# Patient Record
Sex: FEMALE | Race: WHITE
[De-identification: names, ages, dates, MRNs, and addresses within clinical notes are randomized per-mention and may not be internally consistent; named-entity substitution may affect disease eponyms.]

---

## 2017-11-04 ENCOUNTER — HOSPITAL ENCOUNTER (INPATIENT)
Dept: HOSPITAL 17 - NEPI | Age: 42
LOS: 2 days | Discharge: PSYCHIATRIC OR SUBSTANCE USE FACILITY | DRG: 917 | End: 2017-11-06
Attending: HOSPITALIST | Admitting: HOSPITALIST
Payer: SELF-PAY

## 2017-11-04 VITALS — OXYGEN SATURATION: 100 %

## 2017-11-04 VITALS — BODY MASS INDEX: 31.73 KG/M2 | HEIGHT: 64 IN | WEIGHT: 185.85 LBS

## 2017-11-04 VITALS — HEART RATE: 59 BPM

## 2017-11-04 VITALS — HEART RATE: 62 BPM

## 2017-11-04 VITALS — HEART RATE: 58 BPM

## 2017-11-04 VITALS — HEART RATE: 63 BPM

## 2017-11-04 DIAGNOSIS — F43.21: ICD-10-CM

## 2017-11-04 DIAGNOSIS — E87.1: ICD-10-CM

## 2017-11-04 DIAGNOSIS — I10: ICD-10-CM

## 2017-11-04 DIAGNOSIS — M48.52XA: ICD-10-CM

## 2017-11-04 DIAGNOSIS — R00.0: ICD-10-CM

## 2017-11-04 DIAGNOSIS — G93.40: ICD-10-CM

## 2017-11-04 DIAGNOSIS — D50.9: ICD-10-CM

## 2017-11-04 DIAGNOSIS — K21.9: ICD-10-CM

## 2017-11-04 DIAGNOSIS — T43.591A: Primary | ICD-10-CM

## 2017-11-04 DIAGNOSIS — F32.9: ICD-10-CM

## 2017-11-04 DIAGNOSIS — S20.229A: ICD-10-CM

## 2017-11-04 DIAGNOSIS — R40.2430: ICD-10-CM

## 2017-11-04 DIAGNOSIS — Z62.810: ICD-10-CM

## 2017-11-04 DIAGNOSIS — J96.00: ICD-10-CM

## 2017-11-04 LAB
% SATURATION IRON PROFILE: 5.8 % (ref 20–50)
ALBUMIN SERPL-MCNC: 3.3 GM/DL (ref 3.4–5)
ALP SERPL-CCNC: 103 U/L (ref 45–117)
ALT SERPL-CCNC: 14 U/L (ref 10–53)
AMYLASE SERPL-CCNC: 29 U/L (ref 25–115)
APAP SERPL-MCNC: (no result) MCG/ML (ref 10–30)
AST SERPL-CCNC: 17 U/L (ref 15–37)
BASOPHILS # BLD AUTO: 0.1 TH/MM3 (ref 0–0.2)
BASOPHILS NFR BLD: 1 % (ref 0–2)
BILIRUB INDIRECT SERPL-MCNC: 0 MG/DL (ref 0–0.8)
BILIRUB SERPL-MCNC: 0.1 MG/DL (ref 0.2–1)
DIRECT BILIRUBIN ADULT: 0.1 MG/DL (ref 0–0.2)
EOSINOPHIL # BLD: 0.5 TH/MM3 (ref 0–0.4)
EOSINOPHIL NFR BLD: 5.1 % (ref 0–4)
ERYTHROCYTE [DISTWIDTH] IN BLOOD BY AUTOMATED COUNT: 18.6 % (ref 11.6–17.2)
FERRITIN SERPL-MCNC: 12 NG/ML (ref 8–252)
HCT VFR BLD CALC: 26.7 % (ref 35–46)
HGB BLD-MCNC: 8.4 GM/DL (ref 11.6–15.3)
INR PPP: 1 RATIO
IRON (FE): 26 MCG/DL (ref 50–170)
LIPASE: 74 U/L (ref 73–393)
LYMPHOCYTES # BLD AUTO: 0.9 TH/MM3 (ref 1–4.8)
LYMPHOCYTES NFR BLD AUTO: 9.6 % (ref 9–44)
MAGNESIUM SERPL-MCNC: 1.6 MG/DL (ref 1.5–2.5)
MCH RBC QN AUTO: 24.5 PG (ref 27–34)
MCHC RBC AUTO-ENTMCNC: 31.6 % (ref 32–36)
MCV RBC AUTO: 77.4 FL (ref 80–100)
MONOCYTE #: 0.2 TH/MM3 (ref 0–0.9)
MONOCYTES NFR BLD: 1.7 % (ref 0–8)
NEUTROPHILS # BLD AUTO: 7.4 TH/MM3 (ref 1.8–7.7)
NEUTROPHILS NFR BLD AUTO: 82.6 % (ref 16–70)
PHOSPHATE SERPL-MCNC: 3.6 MG/DL (ref 2.5–4.9)
PLATELET # BLD: 342 TH/MM3 (ref 150–450)
PMV BLD AUTO: 6.5 FL (ref 7–11)
PROT SERPL-MCNC: 6.8 GM/DL (ref 6.4–8.2)
PROTHROMBIN TIME: 10.6 SEC (ref 9.8–11.6)
RBC # BLD AUTO: 3.44 MIL/MM3 (ref 4–5.3)
TIBC SERPL-MCNC: 447 MCG/DL (ref 250–450)
WBC # BLD AUTO: 9 TH/MM3 (ref 4–11)

## 2017-11-04 PROCEDURE — 71010: CPT

## 2017-11-04 PROCEDURE — 76937 US GUIDE VASCULAR ACCESS: CPT

## 2017-11-04 PROCEDURE — 82728 ASSAY OF FERRITIN: CPT

## 2017-11-04 PROCEDURE — 0BH17EZ INSERTION OF ENDOTRACHEAL AIRWAY INTO TRACHEA, VIA NATURAL OR ARTIFICIAL OPENING: ICD-10-PCS | Performed by: EMERGENCY MEDICINE

## 2017-11-04 PROCEDURE — 87641 MR-STAPH DNA AMP PROBE: CPT

## 2017-11-04 PROCEDURE — 84132 ASSAY OF SERUM POTASSIUM: CPT

## 2017-11-04 PROCEDURE — 82550 ASSAY OF CK (CPK): CPT

## 2017-11-04 PROCEDURE — 31500 INSERT EMERGENCY AIRWAY: CPT

## 2017-11-04 PROCEDURE — 82010 KETONE BODYS QUAN: CPT

## 2017-11-04 PROCEDURE — 84100 ASSAY OF PHOSPHORUS: CPT

## 2017-11-04 PROCEDURE — 36556 INSERT NON-TUNNEL CV CATH: CPT

## 2017-11-04 PROCEDURE — 84702 CHORIONIC GONADOTROPIN TEST: CPT

## 2017-11-04 PROCEDURE — 84443 ASSAY THYROID STIM HORMONE: CPT

## 2017-11-04 PROCEDURE — 71260 CT THORAX DX C+: CPT

## 2017-11-04 PROCEDURE — 82272 OCCULT BLD FECES 1-3 TESTS: CPT

## 2017-11-04 PROCEDURE — 83735 ASSAY OF MAGNESIUM: CPT

## 2017-11-04 PROCEDURE — 86900 BLOOD TYPING SEROLOGIC ABO: CPT

## 2017-11-04 PROCEDURE — 82150 ASSAY OF AMYLASE: CPT

## 2017-11-04 PROCEDURE — 80076 HEPATIC FUNCTION PANEL: CPT

## 2017-11-04 PROCEDURE — 85025 COMPLETE CBC W/AUTO DIFF WBC: CPT

## 2017-11-04 PROCEDURE — 94003 VENT MGMT INPAT SUBQ DAY: CPT

## 2017-11-04 PROCEDURE — 80307 DRUG TEST PRSMV CHEM ANLYZR: CPT

## 2017-11-04 PROCEDURE — 74177 CT ABD & PELVIS W/CONTRAST: CPT

## 2017-11-04 PROCEDURE — 85610 PROTHROMBIN TIME: CPT

## 2017-11-04 PROCEDURE — 84295 ASSAY OF SERUM SODIUM: CPT

## 2017-11-04 PROCEDURE — 82435 ASSAY OF BLOOD CHLORIDE: CPT

## 2017-11-04 PROCEDURE — 5A1935Z RESPIRATORY VENTILATION, LESS THAN 24 CONSECUTIVE HOURS: ICD-10-PCS | Performed by: EMERGENCY MEDICINE

## 2017-11-04 PROCEDURE — 72125 CT NECK SPINE W/O DYE: CPT

## 2017-11-04 PROCEDURE — 85730 THROMBOPLASTIN TIME PARTIAL: CPT

## 2017-11-04 PROCEDURE — 06HM33Z INSERTION OF INFUSION DEVICE INTO RIGHT FEMORAL VEIN, PERCUTANEOUS APPROACH: ICD-10-PCS | Performed by: SURGERY

## 2017-11-04 PROCEDURE — 93005 ELECTROCARDIOGRAM TRACING: CPT

## 2017-11-04 PROCEDURE — 96374 THER/PROPH/DIAG INJ IV PUSH: CPT

## 2017-11-04 PROCEDURE — 82565 ASSAY OF CREATININE: CPT

## 2017-11-04 PROCEDURE — 82140 ASSAY OF AMMONIA: CPT

## 2017-11-04 PROCEDURE — 99291 CRITICAL CARE FIRST HOUR: CPT

## 2017-11-04 PROCEDURE — 80171 DRUG SCREEN QUANT GABAPENTIN: CPT

## 2017-11-04 PROCEDURE — 83550 IRON BINDING TEST: CPT

## 2017-11-04 PROCEDURE — 70450 CT HEAD/BRAIN W/O DYE: CPT

## 2017-11-04 PROCEDURE — 83690 ASSAY OF LIPASE: CPT

## 2017-11-04 PROCEDURE — 80053 COMPREHEN METABOLIC PANEL: CPT

## 2017-11-04 PROCEDURE — 82805 BLOOD GASES W/O2 SATURATION: CPT

## 2017-11-04 PROCEDURE — 94002 VENT MGMT INPAT INIT DAY: CPT

## 2017-11-04 PROCEDURE — G0390 TRAUMA RESPONS W/HOSP CRITI: HCPCS

## 2017-11-04 PROCEDURE — 82947 ASSAY GLUCOSE BLOOD QUANT: CPT

## 2017-11-04 PROCEDURE — 72170 X-RAY EXAM OF PELVIS: CPT

## 2017-11-04 PROCEDURE — 82948 REAGENT STRIP/BLOOD GLUCOSE: CPT

## 2017-11-04 PROCEDURE — 84520 ASSAY OF UREA NITROGEN: CPT

## 2017-11-04 PROCEDURE — 86901 BLOOD TYPING SEROLOGIC RH(D): CPT

## 2017-11-04 PROCEDURE — 86850 RBC ANTIBODY SCREEN: CPT

## 2017-11-04 PROCEDURE — 94640 AIRWAY INHALATION TREATMENT: CPT

## 2017-11-04 PROCEDURE — 83605 ASSAY OF LACTIC ACID: CPT

## 2017-11-04 PROCEDURE — C9113 INJ PANTOPRAZOLE SODIUM, VIA: HCPCS

## 2017-11-04 PROCEDURE — 83540 ASSAY OF IRON: CPT

## 2017-11-04 PROCEDURE — 84466 ASSAY OF TRANSFERRIN: CPT

## 2017-11-04 PROCEDURE — 94664 DEMO&/EVAL PT USE INHALER: CPT

## 2017-11-04 PROCEDURE — 36600 WITHDRAWAL OF ARTERIAL BLOOD: CPT

## 2017-11-04 RX ADMIN — CHLORHEXIDINE GLUCONATE 0.12% ORAL RINSE SCH ML: 1.2 LIQUID ORAL at 20:07

## 2017-11-04 RX ADMIN — IPRATROPIUM BROMIDE AND ALBUTEROL SULFATE SCH AMPULE: .5; 3 SOLUTION RESPIRATORY (INHALATION) at 17:10

## 2017-11-04 RX ADMIN — HUMAN INSULIN SCH: 100 INJECTION, SOLUTION SUBCUTANEOUS at 18:00

## 2017-11-04 RX ADMIN — PHENYTOIN SODIUM SCH MLS/HR: 50 INJECTION INTRAMUSCULAR; INTRAVENOUS at 16:35

## 2017-11-04 RX ADMIN — PROPOFOL PRN MLS/HR: 10 INJECTION, EMULSION INTRAVENOUS at 22:21

## 2017-11-04 RX ADMIN — POLYVINYL ALCOHOL SCH DROP: 14 SOLUTION/ DROPS OPHTHALMIC at 18:03

## 2017-11-04 RX ADMIN — STANDARDIZED SENNA CONCENTRATE AND DOCUSATE SODIUM SCH TAB: 8.6; 5 TABLET, FILM COATED ORAL at 21:30

## 2017-11-04 RX ADMIN — PROPOFOL PRN MLS/HR: 10 INJECTION, EMULSION INTRAVENOUS at 16:35

## 2017-11-04 RX ADMIN — Medication SCH ML: at 21:30

## 2017-11-04 RX ADMIN — HEPARIN SODIUM SCH UNITS: 10000 INJECTION, SOLUTION INTRAVENOUS; SUBCUTANEOUS at 16:47

## 2017-11-04 RX ADMIN — HUMAN INSULIN SCH: 100 INJECTION, SOLUTION SUBCUTANEOUS at 23:37

## 2017-11-04 RX ADMIN — PROPOFOL PRN MLS/HR: 10 INJECTION, EMULSION INTRAVENOUS at 16:47

## 2017-11-04 NOTE — RADRPT
EXAM DATE/TIME:  11/04/2017 14:27 

 

[facility] COMPARISON:     

No previous studies available for comparison.

 

                     

INDICATIONS :     

Trauma alert. Possible fall down stairs

                     

 

MEDICAL HISTORY :            

Unobtainable   

 

SURGICAL HISTORY :        

Unobtainable

 

ENCOUNTER:     

Initial                                        

 

ACUITY:     

1 day      

 

PAIN SCORE:     

Non-responsive.

 

LOCATION:     

Bilateral chest 

 

FINDINGS:     

A single frontal view of the pelvis demonstrates no evidence of fracture.  The bony pelvic ring is in
tact.  Bony mineralization is normal.  The soft tissues are intact.

 

CONCLUSION:     Intact pelvis.

 

 

 

 Dash Andres MD on November 04, 2017 at 14:57           

Board Certified Radiologist.

 This report was verified electronically.

## 2017-11-04 NOTE — HHI.HP
History of Present Illness


Primary Care Physician


Unknown


Admission Diagnosis





unresponsive, respiratory failure, overdose


Diagnoses:  


History of Present Illness


42 year-old female according to the paramedic report likely overdose atarax-was 

found down at the bottom of the staircase.  Her GCS was 3 on arrival she did 

not respond to Marcaine she was difficult access so that so that I was inserted 

to a tibia she remained hemodynamically normal with the low GCS.  Open arrival 

she was orotracheally intubated.  Fast negative exam by external trauma right 

femoral vein IV Cordis access was obtained and patient brought was brought to 

CAT scan for her workup.





Review of Systems


ROS Limitations:  Clinical Condition, Intoxication, Intubated, Altered Mental 

Status, Unresponsive


Constitutional:  DENIES: Fever, Chills, Change in appetite


Endocrine:  DENIES: Heat/cold intolerance


Eyes:  DENIES: Blurred vision, Eye pain





Past Family Social History


Past Medical History


cannot be obtained


Past Surgical History


c spine fusion


Reported Medications


cannot be obtained


Family History


cannot be obtained


Social History


cannot be obtained





Physical Exam


Vital Signs





Vital Signs








  Date Time  Temp Pulse Resp B/P (MAP) Pulse Ox O2 Delivery O2 Flow Rate FiO2


 


17 15:08     100   100








Physical Exam


GENERAL: This is a well-nourished,nonresponsive


SKIN: No rashes, ecchymoses or lesions. Cool and dry.


HEAD: Atraumatic. Normocephalic.


EYES: Pupils equal round and reactive. Extraocular motions intact. 


ENT: Nose without bleeding. Airway patent.


NECK: Trachea midline. No JVD or lymphadenopathy. Supple,


CARDIOVASCULAR: Regular rate and rhythm without murmurs, gallops, or rubs. 


RESPIRATORY: Clear to auscultation. Breath sounds equal bilaterally. No wheezes

, rales, or rhonchi.  


GASTROINTESTINAL: Abdomen soft, , nondistended.  N


MUSCULOSKELETAL: Extremities without clubbing, cyanosis, or edema. No join, 

effusion, or edema 


NEUROLOGICAL: gcs 3.


Laboratory





Laboratory Tests








Test


  17


14:38


 


White Blood Count 9.0 


 


Red Blood Count 3.44 


 


Hemoglobin 8.4 


 


Bedside Hemoglobin 9.5 


 


Hematocrit 26.7 


 


Bedside Hematocrit 28.0 


 


Mean Corpuscular Volume 77.4 


 


Mean Corpuscular Hemoglobin 24.5 


 


Mean Corpuscular Hemoglobin


Concent 31.6 


 


 


Red Cell Distribution Width 18.6 


 


Platelet Count 342 


 


Mean Platelet Volume 6.5 


 


Neutrophils (%) (Auto) 82.6 


 


Lymphocytes (%) (Auto) 9.6 


 


Monocytes (%) (Auto) 1.7 


 


Eosinophils (%) (Auto) 5.1 


 


Basophils (%) (Auto) 1.0 


 


Neutrophils # (Auto) 7.4 


 


Lymphocytes # (Auto) 0.9 


 


Monocytes # (Auto) 0.2 


 


Eosinophils # (Auto) 0.5 


 


Basophils # (Auto) 0.1 


 


CBC Comment DIFF FINAL 


 


Differential Comment  


 


Prothrombin Time 10.6 


 


Prothromb Time International


Ratio 1.0 


 


 


Activated Partial


Thromboplast Time 20.1 


 


 


Bedside Sodium 130 


 


Bedside Potassium 4.7 


 


Bedside Chloride 105 


 


Bedside Blood Urea Nitrogen 10 


 


Bedside Creatinine 0.8 


 


Bedside Glucose 116 








Result Diagram:  


17





Imaging





Last 48 hours Impressions








Pelvis X-Ray 17 Signed





Impressions: 





 Service Date/Time:  2017 14:27 - CONCLUSION: Intact 





 pelvis.     Dash Andres MD 


 


Head CT 17 Signed





Impressions: 





 Service Date/Time:  2017 14:54 - CONCLUSION:  Negative 





 noncontrast head CT.     Dash Andres MD 


 


Chest X-Ray 17 Signed





Impressions: 





 Service Date/Time:  2017 14:27 - CONCLUSION:  Bilateral 





 airspace opacities, left worse than right. Endotracheal tube tip close to the 





 adalberto.     Dash Andres MD 











Capjuvenali VTE Risk Assessment


Caprini VTE Risk Assessment:  No/Low Risk (score <= 1)


Caprini Risk Assessment Model











 Point Value = 1          Point Value = 2  Point Value = 3  Point Value = 5


 


Age 41-60


Minor surgery


BMI > 25 kg/m2


Swollen legs


Varicose veins


Pregnancy or postpartum


History of unexplained or recurrent


   spontaneous 


Oral contraceptives or hormone


   replacement


Sepsis (< 1 month)


Serious lung disease, including


   pneumonia (< 1 month)


Abnormal pulmonary function


Acute myocardial infarction


Congestive heart failure (< 1 month)


History of inflammatory bowel disease


Medical patient at bed rest Age 61-74


Arthroscopic surgery


Major open surgery (> 45 min)


Laparoscopic surgery (> 45 min)


Malignancy


Confined to bed (> 72 hours)


Immobilizing plaster cast


Central venous access Age >= 75


History of VTE


Family history of VTE


Factor V Leiden


Prothrombin 40333D


Lupus anticoagulant


Anticardiolipin antibodies


Elevated serum homocysteine


Heparin-induced thrombocytopenia


Other congenital or acquired


   thrombophilia Stroke (< 1 month)


Elective arthroplasty


Hip, pelvis, or leg fracture


Acute spinal cord injury (< 1 month)








Prophylaxis Regimen











   Total Risk


Factor Score Risk Level Prophylaxis Regimen


 


0-1      Low Early ambulation


 


2 Moderate Order ONE of the following:


*Sequential Compression Device (SCD)


*Heparin 5000 units SQ BID


 


3-4 Higher Order ONE of the following medications:


*Heparin 5000 units SQ TID


*Enoxaparin/Lovenox 40 mg SQ daily (WT < 150 kg, CrCl > 30 mL/min)


*Enoxaparin/Lovenox 30 mg SQ daily (WT < 150 kg, CrCl > 10-29 mL/min)


*Enoxaparin/Lovenox 30 mg SQ BID (WT < 150 kg, CrCl > 30 mL/min)


AND/OR


*Sequential Compression Device (SCD)


 


5 or more Highest Order ONE of the following medications:


*Heparin 5000 units SQ TID (Preferred with Epidurals)


*Enoxaparin/Lovenox 40 mg SQ daily (WT < 150 kg, CrCl > 30 mL/min)


*Enoxaparin/Lovenox 30 mg SQ daily (WT < 150 kg, CrCl > 10-29 mL/min)


*Enoxaparin/Lovenox 30 mg SQ BID (WT < 150 kg, CrCl > 30 mL/min)


AND


*Sequential Compression Device (SCD)











Assessment and Plan


Assessment and Plan


Drug overdose-atarax-resulting in coma


No traumatic injuries


Orotracheal intubation follow GCS


Right femoral central line for poor access





Admit to medical intensivist











Sruthi Plummer MD 2017 15:54

## 2017-11-04 NOTE — PD.CONS
[facility]


Service


Critical Care Medicine


Consult Requested By


Dr. Plummer


Reason for Consult


Critical care management status post fall/possible overdose with hydroxyzine


Primary Care Physician


Unknown


History of Present Illness


This is a 42-year-old  female.  Actual name is Yrn Yanes.  Date of 

admission 11/4/2017.  Date of consultation 11/4/2017.  She has a history of 

gastroesophageal reflux disease, diarrhea and unknown psychiatric history.  She 

is on mirtazapine, fluoxetine, gabapentin, benztropine, hydroxyzine, loperamide 

and pantoprazole.  She presented to [facility] as a trauma alert 1 status post 

fall down unknown amount of steps and was found by boyfriend with an almost 

empty bottle of Atarax 50 mg tablets #60.





Upon arrival patient was collared on a backboard protecting her airway.  

Patient was intubated with 7.5 ET tube@receiving 40 mg etomidate 200 mg 

succinylcholine.  The trauma surgeon put a right femoral Cordis central line.  

Please refer to his procedure note.  Patient's blood pressure continued to be 

stable.  According to the records, there is noted to be contusions on her upper 

back was noticed along with the superficial laceration perirectally at 5 o'

clock position.  There was a significant step off at C7-T1.  Images negative.  

Trauma surgery signed off on the trauma since as per him this is a medical case 

given the overdose. 





CT C-spine revealed no acute fracture.  Previous posterior cervical occipital 

cervical thoracic fusion at the level TII.  Also discectomy and fusion 

procedure with anterior instrumentation from C2 through C6.  Chronic appearing 

anterior compression fracture at C5.  Noted kyphotic deformity C5/C6.





Review of Systems


ROS Limitations:  Intubated





Past Family Social History


Past Medical History


Depression


Gastroesophageal reflux disease


Diarrhea


Past Surgical History


Prior cervicooccipital/cervicothoracic fusion.  Prior discectomy/fusion with 

anterior instrumentation C3 to C6.


Reported Medications


Mirtazapine 15 mg by mouth daily


Fluoxetine 40 mg by mouth daily


Pantoprazole 40 mg by mouth daily


Gabapentin 600 mg by mouth 4 times a day


Benztropine 1 mg twice a day


Loperamide 2 mg as needed


Hydroxyzine 50 mg by mouth twice a day


Active Ordered Medications


Reviewed in EMR


Family History


Unknown/unobtainable.  Family is currently unavailable


Social History


Unknown if any prior alcohol/tobacco or illicit drug use.  Urine toxicology 

screen pending.





Physical Exam


Vital Signs





Vital Signs








  Date Time  Temp Pulse Resp B/P (MAP) Pulse Ox O2 Delivery O2 Flow Rate FiO2


 


11/4/17 15:08     100   100








Physical Exam


GENERAL: 42 year old  female currently in c-collar


SKIN: Warm and dry.  We'll perfused


HEAD: Atraumatic. Normocephalic. 


EYES: Pupils equal and round about 5 mm bilaterally and reactive. No scleral 

icterus. No injection or drainage. 


ENT: No nasal bleeding or discharge.  Mucous membranes pink and moist.  

Orotracheally intubated


NECK: Trachea midline. No JVD.  Currently in c-collar


CARDIOVASCULAR: Tachycardic, RR.  S1, S2.  No S4.  Without murmur


RESPIRATORY: Distant but essentially Clear to auscultation. Breath sounds equal 

bilaterally. 


GASTROINTESTINAL: Abdomen soft, non-tender, obese.  Hypoactive bowel sounds are 

appreciated


MUSCULOSKELETAL: Extremities without difficulty and peripheral edema. No 

obvious deformities. 


NEUROLOGICAL: Currently sedated on the ventilator propofol drip at 50 mu./kg 

per minute.  Positive gag.  Positive corneal reflux.  Currently not withdrawing 

to pain.


Laboratory





Laboratory Tests








Test


  11/4/17


14:38


 


White Blood Count 9.0 


 


Red Blood Count 3.44 


 


Hemoglobin 8.4 


 


Bedside Hemoglobin 9.5 


 


Hematocrit 26.7 


 


Bedside Hematocrit 28.0 


 


Mean Corpuscular Volume 77.4 


 


Mean Corpuscular Hemoglobin 24.5 


 


Mean Corpuscular Hemoglobin


Concent 31.6 


 


 


Red Cell Distribution Width 18.6 


 


Platelet Count 342 


 


Mean Platelet Volume 6.5 


 


Neutrophils (%) (Auto) 82.6 


 


Lymphocytes (%) (Auto) 9.6 


 


Monocytes (%) (Auto) 1.7 


 


Eosinophils (%) (Auto) 5.1 


 


Basophils (%) (Auto) 1.0 


 


Neutrophils # (Auto) 7.4 


 


Lymphocytes # (Auto) 0.9 


 


Monocytes # (Auto) 0.2 


 


Eosinophils # (Auto) 0.5 


 


Basophils # (Auto) 0.1 


 


CBC Comment DIFF FINAL 


 


Differential Comment  


 


Prothrombin Time 10.6 


 


Prothromb Time International


Ratio 1.0 


 


 


Activated Partial


Thromboplast Time 20.1 


 


 


Bedside Sodium 130 


 


Bedside Potassium 4.7 


 


Bedside Chloride 105 


 


Bedside Blood Urea Nitrogen 10 


 


Bedside Creatinine 0.8 


 


Bedside Glucose 116 








Result Diagram:  


11/4/17 1438





Imaging





Last Impressions








Pelvis X-Ray 11/4/17 1439 Signed





Impressions: 





 Service Date/Time:  Saturday, November 4, 2017 14:27 - CONCLUSION: Intact 





 pelvis.     Dash Andres MD 


 


Head CT 11/4/17 1439 Signed





Impressions: 





 Service Date/Time:  Saturday, November 4, 2017 14:54 - CONCLUSION:  Negative 





 noncontrast head CT.     Dash Andres MD 


 


Chest X-Ray 11/4/17 1439 Signed





Impressions: 





 Service Date/Time:  Saturday, November 4, 2017 14:27 - CONCLUSION:  Bilateral 





 airspace opacities, left worse than right. Endotracheal tube tip close to the 





 adalberto.     Dash Andres MD 


 


Chest CT 11/4/17 1439 Signed





Impressions: 





 Service Date/Time:  Saturday, November 4, 2017 14:58 - CONCLUSION:  Mild 





 airspace consolidation of both lungs and tiny right, very small left pleural 





 effusions.     Dash Andres MD 


 


Cervical Spine CT 11/4/17 1439 Signed





Impressions: 





 Service Date/Time:  Saturday, November 4, 2017 14:54 - CONCLUSION:  1. No 

acute 





 fracture or acute appearing malalignment of the cervical spine. 2. Previous 





 anterior and posterior fusion as above. No bony bridging at C2/C3 but the 





 anterior fusion appears otherwise solid down to C6.     Dash Andres MD 


 


Abdomen/Pelvis CT 11/4/17 1439 Signed





Impressions: 





 Service Date/Time:  Saturday, November 4, 2017 14:56 - CONCLUSION:  No 

evidence 





 of acute trauma the abdomen or pelvis. Large stool throughout the colon.     





 Dash Andres MD 











Assessment and Plan


Assessment and Plan


Neuro/Psych:





Acute altered mental status possible hydroxyzine overdose


Prior cervical discectomy/fusion





CT brain 11/4 revealed no acute intracranial findings


CT C-spine revealed previous posterior cervicooccipital and cervico thoracic 

fusion at the level T2.  Discectomy and fusion procedure with anterior 

instrumentation of C2 through C6.  Chronic appearing anterior compression 

deformity of the C5 vertebral body with kyphotic deformity at C5/C6.


Currently on propofol/fentanyl drips for sedation/analgesia while intubated


Goal of RA SS -2


Daily sedation vacation


Urine drug screen currently pending along with EtOH, salicylates and 

acetaminophen





Holding home medications of mirtazapine 15 mg, fluoxetine 40 mg daily, 

gabapentin 600 mg 4 times a day, been serving 1 mg twice a day and hydroxyzine 

50 mg twice a day


Check gabapentin level





Poison control be notified





CV:





Sinus tachycardia


Hypertension





Patient is currently normal saline at 84 cc an hour.


Status post 1 L crystalloid in ED


Currently not requiring vasopressors and/or antihypertensives.


Follow-up EKG 





Resp:





Acute respiratory failure





[facility] 16/550/1/5/100


Ventilator bundle


Albuterol/ipratropium aerosols with albuterol aerosols every 2 hours.  Dyspnea


Spontaneous breathing trials daily


 


GI: 





Gastroesophageal reflux disease


History of diarrhea





NGT to LIWS


Pantoprazole for GI prophylaxis


Docusate sodium/senna 1 tablet twice a day for bowel regimen


Status post 50 g sorbitol and charcoal for gastric decontamination


Holding loperamide 2 mg as needed





:  





Meek catheter will be placed for accurate I's and O's in a critically ill 

patient





Endo:





Sliding-scale insulin if indicated to maintain euglycemia


Check TSH


  


Renal:





Creatinine currently within normal limits


Monitor urine output


Accurate I's and O's


 


Heme:





Microcytic anemia





Check iron studies/TIBC and ferritin.


No indications for transfusion of blood proximally at this time.





ID:





Monitor for infection





FEN:





Hyponatremia





Replace electrolytes as clinically indicated per ICU electrolyte protocol





MSK:





PT evaluate and treat





Access


- Utilize peripheral IV.  Remove right femoral Cordis once peripheral IVs 

established





Prophylaxis


- GI - pantoprazole


- DVT - SCD/heparin subcutaneous





Critical Care:


The total critical care time was 35 minutes. Time to perform other separately 

billable procedures was not included in the critical care time.


Code Status


Full code


Discussed Condition With


Dr. Hoang.  Care plan discussed and all questions answered











Jose Wilson MD Nov 4, 2017 16:05

## 2017-11-04 NOTE — PD
HPI


Chief Complaint:  trauma alert


Time Seen by Provider:  14:28


Travel History


International Travel<30 days:  No


Contact w/Intl Traveler<30days:  No


Traveled to known affect area:  No





History of Present Illness


HPI


Patient with unknown ID was brought in as a trauma alert by the paramedic.  I 

was in the trauma bay waiting for the patient's arrival.  Initially the patient 

was found laying on the floor by her boyfriend in her house at the bottom of 

the stairs with both legs tucked under her bottom.  Patient was GCS of 3 upon 

their arrival.  They tried to give her Narcan without any reversal.  No 

external injuries were noticed.  There was an empty bottle of hydroxyzine 

noticed next to her that was filled recently and only 2 pills left in the 

bottle.  She was maintaining her airway.  They brought her in boarded and 

collared.  Patient obviously is unable to give any history.  She was 

hemodynamically stable and GCS continued to be 3.





PFSH


Past Medical History


*** Narrative Medical


Unknown





Allergies-Medications


(Allergen,Severity, Reaction):  


Coded Allergies:  


     Sulfa (Sulfonamide Antibiotics) (Verified  Allergy, Severe, 11/6/17)


     azithromycin (Verified  Allergy, Severe, 11/6/17)


     ondansetron (Verified  Allergy, Severe, Swelling, 11/5/17)


     cyclobenzaprine (Verified  Allergy, Intermediate, 11/5/17)


     propoxyphene (Verified  Allergy, Intermediate, 11/5/17)


Comments


Unknown


Reported Meds & Prescriptions





Reported Meds & Active Scripts


Active


Reported


Loperamide (Loperamide HCl) 2 Mg Cap 2 Mg PO AS DIRECTED PRN


     One capsule after each loose stool.


     Not to exceed 8 capsules per day.


Pantoprazole (Pantoprazole Sodium) 40 Mg Tab 40 Mg PO BID


Gabapentin 600 Mg Tab 600 Mg PO QID


Fluoxetine (Fluoxetine HCl) 40 Mg Cap 40 Cap PO DAILY


Mirtazapine 15 Mg Tab 15 Mg PO HS





Narrative Medication


Unknown





Review of Systems


ROS Limitations:  Unresponsive


Except as stated in HPI:  all other systems reviewed are Neg





Physical Exam


Narrative


GENERAL: Unresponsive, boarded and collared


SKIN: Focused skin assessment warm/dry.  Contusions on the upper back


HEAD: Atraumatic. Normocephalic. 


EYES: Pupils equal and round. No scleral icterus. No injection or drainage. 


ENT: No nasal bleeding or discharge.  Mucous membranes pink and moist.


NECK: Trachea midline. No JVD. 


CARDIOVASCULAR: Regular rate and rhythm.  No murmur appreciated.


RESPIRATORY: No accessory muscle use. Clear to auscultation. Breath sounds 

equal bilaterally. 


GASTROINTESTINAL: Abdomen soft, non-tender, nondistended. Hepatic and splenic 

margins not palpable.  Perirectal laceration at 5 o'clock position


MUSCULOSKELETAL: No obvious deformities. No clubbing.  No cyanosis.  No edema. 


NEUROLOGICAL: GCS of 3


PSYCHIATRIC: Unable to assess





Data


Data


Last Documented VS





Vital Signs








  Date Time  Temp Pulse Resp B/P (MAP) Pulse Ox O2 Delivery O2 Flow Rate FiO2


 


11/4/17 15:08     100   100








Orders





 Orders


I-Stat Profile (11/4/17 14:39)


I-Stat Creatinine (11/4/17 14:39)


Complete Blood Count With Diff (11/4/17 14:39)


Prothrombin Time / Inr (Pt) (11/4/17 14:39)


Act Partial Throm Time (Ptt) (11/4/17 14:39)


Type And Screen (11/4/17 14:39)


Chest, Single Ap (11/4/17 14:39)


Pelvis, Ap Only (Routine) (11/4/17 14:39)


Ct Brain W/O Iv Contrast(Rout) (11/4/17 14:39)


Ct Cerv Spine W/O Contrast (11/4/17 14:39)


Ct Abd/Pel W Iv Contrast(Rout) (11/4/17 14:39)


Ct Thorax/ Chest W Iv Contrast (11/4/17 14:39)


Iv Access Insert/Monitor (11/4/17 14:39)


Ecg Monitoring (11/4/17 14:39)


Oximetry (11/4/17 14:39)


Oxygen Administration (11/4/17 14:39)


Propofol 1000 Mg/100 Ml Inj (Diprivan 10 (11/4/17 14:40)


Admit Order (Ed Use Only) (11/4/17 15:17)





Labs





Laboratory Tests








Test


  11/4/17


14:38


 


White Blood Count 9.0 TH/MM3 


 


Red Blood Count 3.44 MIL/MM3 


 


Hemoglobin 8.4 GM/DL 


 


Bedside Hemoglobin 9.5 G/DL 


 


Hematocrit 26.7 % 


 


Bedside Hematocrit 28.0 % 


 


Mean Corpuscular Volume 77.4 FL 


 


Mean Corpuscular Hemoglobin 24.5 PG 


 


Mean Corpuscular Hemoglobin


Concent 31.6 % 


 


 


Red Cell Distribution Width 18.6 % 


 


Platelet Count 342 TH/MM3 


 


Mean Platelet Volume 6.5 FL 


 


Neutrophils (%) (Auto) 82.6 % 


 


Lymphocytes (%) (Auto) 9.6 % 


 


Monocytes (%) (Auto) 1.7 % 


 


Eosinophils (%) (Auto) 5.1 % 


 


Basophils (%) (Auto) 1.0 % 


 


Neutrophils # (Auto) 7.4 TH/MM3 


 


Lymphocytes # (Auto) 0.9 TH/MM3 


 


Monocytes # (Auto) 0.2 TH/MM3 


 


Eosinophils # (Auto) 0.5 TH/MM3 


 


Basophils # (Auto) 0.1 TH/MM3 


 


CBC Comment DIFF FINAL 


 


Differential Comment  


 


Prothrombin Time 10.6 SEC 


 


Prothromb Time International


Ratio 1.0 RATIO 


 


 


Activated Partial


Thromboplast Time 20.1 SEC 


 


 


Bedside Sodium 130 MMOL/L 


 


Bedside Potassium 4.7 MMOL/L 


 


Bedside Chloride 105 MMOL/L 


 


Bedside Blood Urea Nitrogen 10 MG/DL 


 


Bedside Creatinine 0.8 MG/DL 


 


Bedside Glucose 116 MG/DL 


 


Phosphorus Level 3.6 MG/DL 


 


Magnesium Level 1.6 MG/DL 


 


Iron Level 26 MCG/DL 


 


Total Iron Binding Capacity 447 MCG/DL 


 


Percent Iron Saturation 5.8 % 


 


Transferrin 319 MG/DL 


 


Ferritin 12 NG/ML 


 


Total Bilirubin 0.1 MG/DL 


 


Direct Bilirubin 0.1 MG/DL 


 


Indirect Bilirubin 0.0 MG/DL 


 


Aspartate Amino Transf


(AST/SGOT) 17 U/L 


 


 


Alanine Aminotransferase


(ALT/SGPT) 14 U/L 


 


 


Alkaline Phosphatase 103 U/L 


 


Total Creatine Kinase 89 U/L 


 


Total Protein 6.8 GM/DL 


 


Albumin 3.3 GM/DL 


 


Amylase Level 29 U/L 


 


Lipase 74 U/L 


 


Thyroid Stimulating Hormone


3rd Gen 1.950 uIU/ML 


 


 


Human Chorionic Gonadotropin,


Quant 3 MIU/ML 


 


 


Salicylates Level 13.0 MG/DL 


 


Acetaminophen Level


  LESS THAN 2.0


MCG/ML


 


Ethyl Alcohol Level


  LESS THAN 3


MG/DL


 


B-Hydroxybutyrate 0.07 MMOL/L 











[facility]


Medical Screen Exam Complete:  Yes


Emergency Medical Condition:  Yes


Medical Record Reviewed:  Yes


EKG Prior to Arrival:  Yes


Differential Diagnosis


Intracranial bleed, intrathoracic injury, intrabdominal injury, cervical 

fracture


Narrative Course


3:11 PM given patient's poor GCS status initially I decided to intubate her to 

protect her airway.  Please refer to my procedure note regarding this.  The 

trauma surgeon was present and together patient was inspected as a secondary 

infection.  No external injuries anteriorly was noticed.  The trauma surgeon 

put a right femoral central line.  Please refer to his procedure note.  Patient'

s blood pressure continued to be stable.  She was rolled off the backboard and 

contusions on her upper back was noticed along with the superficial laceration 

perirectally at 5 o'clock position.  There was a significant step off at C7-T1.

  Patient remained hemodynamically stable when left the trauma bay to go for CT 

scan.  The trauma surgeon assisted her.  Portable chest x-ray confirmed tube 

position.  The trauma surgeon just called me to let me note that there were no 

internal injuries and he will wanted the patient to be admitted medically.  He 

works sign off on the trauma since as per him this is a medical case given the 

overdose.  I put a call out for the intensivist for patient's admission.  

Patient was given 1 L of IV fluid bolus.


Critical Care Narrative


Aggregate critical care time was 45 minutes. Time to perform other separately 

billable procedures was not  


included in the critical care time. My time did not include minutes spent 

treating any other patients simultaneously or on  


activities that did not directly contribute to the patient's treatment.  





The services I provided to this patient were to treat and/or prevent clinically 

significant deterioration that could result  


in:  Unresponsive, respiratory failure





I provided critical care services requiring my management, as noted below:


Chart data review, documentation time, medication orders and management, vital 

sign assessments/reviewing monitor data,  


ordering and reviewing lab tests, ordering and interpreting/reviewing x-rays 

and diagnostic studies, care of the patient  


and discussion of the patient with the admitting physicians.


Procedures


**Procedure Narrative**


After the risks and benefits were discussed the following procedure was 

performed:


INTUBATION: The patient was put in optimal position for the procedure.  Rapid 

sequence intubation was initiated by me using  


40 milligrams of etomidate IV and 200 milligrams of succinylcholine IV.  The 

patient was intubated with a 7.5 cuffed endotracheal  


tube.  Tube placement was confirmed by visualization of the tube and balloon 

passing through the cords, capnometry and  


subsequent chest x-ray.  Breath sounds were equal and well aerated bilaterally 

postintubation.  No breath sounds over  


stomach.  Patient tolerated procedure well.





Emergency department E-FAST was performed with patient consent.


The curvilinear probe was used in the right upper quadrant/Morison's pouch, 

suprapubic, left upper quadrant/spleenorenal space, epigastric, parasternal 

long axis and anterior bilateral chest wall.  There was no evidence of 

peritoneal free fluid, pericardial effusion, or pneumothorax.





Trauma Alert - Level One


Trauma Alert Level One:  Full trauma team activate, Patient evaluated, Trauma 

surgeon summoned


Time Surgeon Summoned:  14:15





Physician Communication


Dr. Plummer, Dr. Hoang





Diagnosis


Diagnosis:  


 Primary Impression:  


 Unresponsive


 Additional Impressions:  


 Respiratory failure


 Qualified Codes:  J96.00 - Acute respiratory failure, unspecified whether with 

hypoxia or hypercapnia


 Overdose


 Qualified Codes:  T50.904A - Poisoning by unspecified drugs, medicaments and 

biological substances, undetermined, initial encounter


Admitting Physician Requests:  Admit


Scripts


Ferrous Sulfate (Ferosul) 325 Mg (65 Mg Iron) Tablet


325 MG PO BID@12,17 for iron deficiency anemia, #60 MG


   Prov: Bee Vo MD         11/6/17











Terrie Coleman MD Nov 4, 2017 14:53

## 2017-11-04 NOTE — PD.PROCEDR
Central Line Procedure


REASON FOR PROCEDURE


Central venous access





PROCEDURE PERFORMED


Central line placement: [right femoral vein ]





CONSENT


emergency procedure





ANESTHESIA


Local injection of 1% Lidocaine





DESCRIPTION OF THE PROCEDURE


The patient was placed in supine position. Right femoral area was exposed and 

cleansed with ChloraPrep, times two.  Large sterile drape was used to cover the 

patient, with the site exposed, under sterile conditions including cap, face 

mask, sterile gown, and sterile gloves.  On single attempt, the introducer 

needle was inserted with negative pressure in syringe and venous flash was 

obtained.  The guide wire was then advanced without any restriction and the 

needle was removed.  The dilator was used without any complications.  Using 

Seldinger technique the [cordis ] catheter was advanced over the guide wire to 

a depth of [ ] centimeters.  The guide wire was removed.  All ports were 

aspirated with dark venous blood return and flushed easily with sterile saline.

  All ports were capped.  Antibiotic disc was placed around central line at 

puncture site.  The central line was secured to the skin with two interrupted 

2.0 silk sutures.  The area was bandaged with sterile see-through central line 

bandage.





RADIOLOGICAL DATA


landmark technique.





COMPLICATIONS:


No apparent complications





ESTIMATED BLOOD LOSS:


Less than 1 cc.











Sruthi Plummer MD Nov 4, 2017 15:43

## 2017-11-04 NOTE — RADRPT
EXAM DATE/TIME:  11/04/2017 14:58 

 

[facility] COMPARISON:     

No previous studies available for comparison.

 

 

INDICATIONS :     

Trauma alert; fall down stairs.

                      

 

IV CONTRAST:     

75 cc Omnipaque 350 (iohexol) IV 

 

 

RADIATION DOSE:     

42.68 CTDIvol (mGy) ; Combined studies - Thorax/Abdomen/Pelvis; Patient body habitus

 

 

MEDICAL HISTORY :     

Non-responsive.  

 

SURGICAL HISTORY :      

Non-responsive. 

 

ENCOUNTER:      

Initial

 

ACUITY:      

1 day

 

PAIN SCALE:      

Non-responsive

 

LOCATION:       

Bilateral chest 

 

TECHNIQUE:      

Volumetric scanning of the chest was performed.  Using automated exposure control and adjustment of t
he mA and/or kV according to patient size, radiation dose was kept as low as reasonably achievable to
 obtain optimal diagnostic quality images.   DICOM format image data is available electronically for 
review and comparison.  

 

Follow-up recommendations for detected pulmonary nodules are based at a minimum on nodule size and pa
tient risk factors according to Fleischner Society Guidelines.

 

FINDINGS:     

Patchy streaky airspace consolidation seen of both lungs, mostly dependently. There are low density t
iny right and very small left pleural effusions as well. Endotracheal tube tip is approximately 2.5 c
m above the adalberto.

 

     Heart and mediastinum are within normal limits. I don't see a fracture of the visualized osseous
 structures.

 

CONCLUSION:     

Mild airspace consolidation of both lungs and tiny right, very small left pleural effusions.

 

 

 

 Dash Andres MD on November 04, 2017 at 15:31           

Board Certified Radiologist.

 This report was verified electronically.

## 2017-11-04 NOTE — HHI.HP
History of Present Illness


Primary Care Physician


Unknown


Admission Diagnosis





unresponsive, respiratory failure, overdose


Diagnoses:  


History of Present Illness


42 year-old female according to the paramedic report likely overdose atarax-was 

found down at the bottom of the staircase.  Her GCS was 3 on arrival she did 

not respond to Marcaine she was difficult access so that so that I was inserted 

to a tibia she remained hemodynamically normal with the low GCS.  Open arrival 

she was orotracheally intubated.  Fast negative exam by external trauma right 

femoral vein IV Cordis access was obtained and patient brought was brought to 

CAT scan for her workup.





Review of Systems


ROS Limitations:  Clinical Condition, Intoxication, Intubated, Altered Mental 

Status, Unresponsive


Constitutional:  DENIES: Fever, Chills, Change in appetite


Endocrine:  DENIES: Heat/cold intolerance


Eyes:  DENIES: Blurred vision, Eye pain





Past Family Social History


Past Medical History


cannot be obtained


Past Surgical History


c spine fusion


Reported Medications


cannot be obtained


Family History


cannot be obtained


Social History


cannot be obtained





Physical Exam


Vital Signs





Vital Signs








  Date Time  Temp Pulse Resp B/P (MAP) Pulse Ox O2 Delivery O2 Flow Rate FiO2


 


17 15:08     100   100








Physical Exam


GENERAL: This is a well-nourished,nonresponsive


SKIN: No rashes, ecchymoses or lesions. Cool and dry.


HEAD: Atraumatic. Normocephalic.


EYES: Pupils equal round and reactive. Extraocular motions intact. 


ENT: Nose without bleeding. Airway patent.


NECK: Trachea midline. No JVD or lymphadenopathy. Supple,


CARDIOVASCULAR: Regular rate and rhythm without murmurs, gallops, or rubs. 


RESPIRATORY: Clear to auscultation. Breath sounds equal bilaterally. No wheezes

, rales, or rhonchi.  


GASTROINTESTINAL: Abdomen soft, , nondistended.  N


MUSCULOSKELETAL: Extremities without clubbing, cyanosis, or edema. No join, 

effusion, or edema 


NEUROLOGICAL: gcs 3.


Laboratory





Laboratory Tests








Test


  17


14:38


 


White Blood Count 9.0 


 


Red Blood Count 3.44 


 


Hemoglobin 8.4 


 


Bedside Hemoglobin 9.5 


 


Hematocrit 26.7 


 


Bedside Hematocrit 28.0 


 


Mean Corpuscular Volume 77.4 


 


Mean Corpuscular Hemoglobin 24.5 


 


Mean Corpuscular Hemoglobin


Concent 31.6 


 


 


Red Cell Distribution Width 18.6 


 


Platelet Count 342 


 


Mean Platelet Volume 6.5 


 


Neutrophils (%) (Auto) 82.6 


 


Lymphocytes (%) (Auto) 9.6 


 


Monocytes (%) (Auto) 1.7 


 


Eosinophils (%) (Auto) 5.1 


 


Basophils (%) (Auto) 1.0 


 


Neutrophils # (Auto) 7.4 


 


Lymphocytes # (Auto) 0.9 


 


Monocytes # (Auto) 0.2 


 


Eosinophils # (Auto) 0.5 


 


Basophils # (Auto) 0.1 


 


CBC Comment DIFF FINAL 


 


Differential Comment  


 


Prothrombin Time 10.6 


 


Prothromb Time International


Ratio 1.0 


 


 


Activated Partial


Thromboplast Time 20.1 


 


 


Bedside Sodium 130 


 


Bedside Potassium 4.7 


 


Bedside Chloride 105 


 


Bedside Blood Urea Nitrogen 10 


 


Bedside Creatinine 0.8 


 


Bedside Glucose 116 








Result Diagram:  


17





Imaging





Last 48 hours Impressions








Pelvis X-Ray 17 Signed





Impressions: 





 Service Date/Time:  2017 14:27 - CONCLUSION: Intact 





 pelvis.     Dash Andres MD 


 


Head CT 17 Signed





Impressions: 





 Service Date/Time:  2017 14:54 - CONCLUSION:  Negative 





 noncontrast head CT.     Dash Andres MD 


 


Chest X-Ray 17 Signed





Impressions: 





 Service Date/Time:  2017 14:27 - CONCLUSION:  Bilateral 





 airspace opacities, left worse than right. Endotracheal tube tip close to the 





 adalberto.     Dash Andres MD 











Capjuvenali VTE Risk Assessment


Caprini VTE Risk Assessment:  No/Low Risk (score <= 1)


Caprini Risk Assessment Model











 Point Value = 1          Point Value = 2  Point Value = 3  Point Value = 5


 


Age 41-60


Minor surgery


BMI > 25 kg/m2


Swollen legs


Varicose veins


Pregnancy or postpartum


History of unexplained or recurrent


   spontaneous 


Oral contraceptives or hormone


   replacement


Sepsis (< 1 month)


Serious lung disease, including


   pneumonia (< 1 month)


Abnormal pulmonary function


Acute myocardial infarction


Congestive heart failure (< 1 month)


History of inflammatory bowel disease


Medical patient at bed rest Age 61-74


Arthroscopic surgery


Major open surgery (> 45 min)


Laparoscopic surgery (> 45 min)


Malignancy


Confined to bed (> 72 hours)


Immobilizing plaster cast


Central venous access Age >= 75


History of VTE


Family history of VTE


Factor V Leiden


Prothrombin 19875H


Lupus anticoagulant


Anticardiolipin antibodies


Elevated serum homocysteine


Heparin-induced thrombocytopenia


Other congenital or acquired


   thrombophilia Stroke (< 1 month)


Elective arthroplasty


Hip, pelvis, or leg fracture


Acute spinal cord injury (< 1 month)








Prophylaxis Regimen











   Total Risk


Factor Score Risk Level Prophylaxis Regimen


 


0-1      Low Early ambulation


 


2 Moderate Order ONE of the following:


*Sequential Compression Device (SCD)


*Heparin 5000 units SQ BID


 


3-4 Higher Order ONE of the following medications:


*Heparin 5000 units SQ TID


*Enoxaparin/Lovenox 40 mg SQ daily (WT < 150 kg, CrCl > 30 mL/min)


*Enoxaparin/Lovenox 30 mg SQ daily (WT < 150 kg, CrCl > 10-29 mL/min)


*Enoxaparin/Lovenox 30 mg SQ BID (WT < 150 kg, CrCl > 30 mL/min)


AND/OR


*Sequential Compression Device (SCD)


 


5 or more Highest Order ONE of the following medications:


*Heparin 5000 units SQ TID (Preferred with Epidurals)


*Enoxaparin/Lovenox 40 mg SQ daily (WT < 150 kg, CrCl > 30 mL/min)


*Enoxaparin/Lovenox 30 mg SQ daily (WT < 150 kg, CrCl > 10-29 mL/min)


*Enoxaparin/Lovenox 30 mg SQ BID (WT < 150 kg, CrCl > 30 mL/min)


AND


*Sequential Compression Device (SCD)











Assessment and Plan


Assessment and Plan


Drug overdose-atarax-resulting in coma


No traumatic injuries


Orotracheal intubation follow GCS


Right femoral central line for poor access





Admit to medical intensivist











Sruthi Plummer MD 2017 15:54

## 2017-11-04 NOTE — RADRPT
EXAM DATE/TIME:  11/04/2017 14:27 

 

[facility] COMPARISON:     

No previous studies available for comparison.

 

                     

INDICATIONS :     

Trauma alert. Possible fall down stairs

                     

 

MEDICAL HISTORY :            

Unobtainable   

 

SURGICAL HISTORY :        

Unobtainable

 

ENCOUNTER:     

Initial                                        

 

ACUITY:     

1 day      

 

PAIN SCORE:     

Non-responsive.

 

LOCATION:     

Bilateral chest 

 

FINDINGS:     

There is patchy infiltrate in the left mid and lower lung and to a lesser extent, the right upper lob
e. No pleural effusion seen. No pneumothorax. I don't convincingly see a fracture.

 

Endotracheal tube tip is approximately 13 mm above the adalberto.

 

CONCLUSION:     

Bilateral airspace opacities, left worse than right. Endotracheal tube tip close to the adalberto.

 

 

 

 Dash Andres MD on November 04, 2017 at 14:58           

Board Certified Radiologist.

 This report was verified electronically.

## 2017-11-04 NOTE — PD
HPI


Chief Complaint:  trauma alert


Time Seen by Provider:  14:28


Travel History


International Travel<30 days:  No


Contact w/Intl Traveler<30days:  No


Traveled to known affect area:  No





History of Present Illness


HPI


Patient with unknown ID was brought in as a trauma alert by the paramedic.  I 

was in the trauma bay waiting for the patient's arrival.  Initially the patient 

was found laying on the floor by her boyfriend in her house at the bottom of 

the stairs with both legs tucked under her bottom.  Patient was GCS of 3 upon 

their arrival.  They tried to give her Narcan without any reversal.  No 

external injuries were noticed.  There was an empty bottle of hydroxyzine 

noticed next to her that was filled recently and only 2 pills left in the 

bottle.  She was maintaining her airway.  They brought her in boarded and 

collared.  Patient obviously is unable to give any history.  She was 

hemodynamically stable and GCS continued to be 3.





PFSH


Past Medical History


*** Narrative Medical


Unknown





Allergies-Medications


(Allergen,Severity, Reaction):  


Coded Allergies:  


     Sulfa (Sulfonamide Antibiotics) (Verified  Allergy, Severe, 11/6/17)


     azithromycin (Verified  Allergy, Severe, 11/6/17)


     ondansetron (Verified  Allergy, Severe, Swelling, 11/5/17)


     cyclobenzaprine (Verified  Allergy, Intermediate, 11/5/17)


     propoxyphene (Verified  Allergy, Intermediate, 11/5/17)


Comments


Unknown


Reported Meds & Prescriptions





Reported Meds & Active Scripts


Active


Reported


Loperamide (Loperamide HCl) 2 Mg Cap 2 Mg PO AS DIRECTED PRN


     One capsule after each loose stool.


     Not to exceed 8 capsules per day.


Pantoprazole (Pantoprazole Sodium) 40 Mg Tab 40 Mg PO BID


Gabapentin 600 Mg Tab 600 Mg PO QID


Fluoxetine (Fluoxetine HCl) 40 Mg Cap 40 Cap PO DAILY


Mirtazapine 15 Mg Tab 15 Mg PO HS





Narrative Medication


Unknown





Review of Systems


ROS Limitations:  Unresponsive


Except as stated in HPI:  all other systems reviewed are Neg





Physical Exam


Narrative


GENERAL: Unresponsive, boarded and collared


SKIN: Focused skin assessment warm/dry.  Contusions on the upper back


HEAD: Atraumatic. Normocephalic. 


EYES: Pupils equal and round. No scleral icterus. No injection or drainage. 


ENT: No nasal bleeding or discharge.  Mucous membranes pink and moist.


NECK: Trachea midline. No JVD. 


CARDIOVASCULAR: Regular rate and rhythm.  No murmur appreciated.


RESPIRATORY: No accessory muscle use. Clear to auscultation. Breath sounds 

equal bilaterally. 


GASTROINTESTINAL: Abdomen soft, non-tender, nondistended. Hepatic and splenic 

margins not palpable.  Perirectal laceration at 5 o'clock position


MUSCULOSKELETAL: No obvious deformities. No clubbing.  No cyanosis.  No edema. 


NEUROLOGICAL: GCS of 3


PSYCHIATRIC: Unable to assess





Data


Data


Last Documented VS





Vital Signs








  Date Time  Temp Pulse Resp B/P (MAP) Pulse Ox O2 Delivery O2 Flow Rate FiO2


 


11/4/17 15:08     100   100








Orders





 Orders


I-Stat Profile (11/4/17 14:39)


I-Stat Creatinine (11/4/17 14:39)


Complete Blood Count With Diff (11/4/17 14:39)


Prothrombin Time / Inr (Pt) (11/4/17 14:39)


Act Partial Throm Time (Ptt) (11/4/17 14:39)


Type And Screen (11/4/17 14:39)


Chest, Single Ap (11/4/17 14:39)


Pelvis, Ap Only (Routine) (11/4/17 14:39)


Ct Brain W/O Iv Contrast(Rout) (11/4/17 14:39)


Ct Cerv Spine W/O Contrast (11/4/17 14:39)


Ct Abd/Pel W Iv Contrast(Rout) (11/4/17 14:39)


Ct Thorax/ Chest W Iv Contrast (11/4/17 14:39)


Iv Access Insert/Monitor (11/4/17 14:39)


Ecg Monitoring (11/4/17 14:39)


Oximetry (11/4/17 14:39)


Oxygen Administration (11/4/17 14:39)


Propofol 1000 Mg/100 Ml Inj (Diprivan 10 (11/4/17 14:40)


Admit Order (Ed Use Only) (11/4/17 15:17)





Labs





Laboratory Tests








Test


  11/4/17


14:38


 


White Blood Count 9.0 TH/MM3 


 


Red Blood Count 3.44 MIL/MM3 


 


Hemoglobin 8.4 GM/DL 


 


Bedside Hemoglobin 9.5 G/DL 


 


Hematocrit 26.7 % 


 


Bedside Hematocrit 28.0 % 


 


Mean Corpuscular Volume 77.4 FL 


 


Mean Corpuscular Hemoglobin 24.5 PG 


 


Mean Corpuscular Hemoglobin


Concent 31.6 % 


 


 


Red Cell Distribution Width 18.6 % 


 


Platelet Count 342 TH/MM3 


 


Mean Platelet Volume 6.5 FL 


 


Neutrophils (%) (Auto) 82.6 % 


 


Lymphocytes (%) (Auto) 9.6 % 


 


Monocytes (%) (Auto) 1.7 % 


 


Eosinophils (%) (Auto) 5.1 % 


 


Basophils (%) (Auto) 1.0 % 


 


Neutrophils # (Auto) 7.4 TH/MM3 


 


Lymphocytes # (Auto) 0.9 TH/MM3 


 


Monocytes # (Auto) 0.2 TH/MM3 


 


Eosinophils # (Auto) 0.5 TH/MM3 


 


Basophils # (Auto) 0.1 TH/MM3 


 


CBC Comment DIFF FINAL 


 


Differential Comment  


 


Prothrombin Time 10.6 SEC 


 


Prothromb Time International


Ratio 1.0 RATIO 


 


 


Activated Partial


Thromboplast Time 20.1 SEC 


 


 


Bedside Sodium 130 MMOL/L 


 


Bedside Potassium 4.7 MMOL/L 


 


Bedside Chloride 105 MMOL/L 


 


Bedside Blood Urea Nitrogen 10 MG/DL 


 


Bedside Creatinine 0.8 MG/DL 


 


Bedside Glucose 116 MG/DL 


 


Phosphorus Level 3.6 MG/DL 


 


Magnesium Level 1.6 MG/DL 


 


Iron Level 26 MCG/DL 


 


Total Iron Binding Capacity 447 MCG/DL 


 


Percent Iron Saturation 5.8 % 


 


Transferrin 319 MG/DL 


 


Ferritin 12 NG/ML 


 


Total Bilirubin 0.1 MG/DL 


 


Direct Bilirubin 0.1 MG/DL 


 


Indirect Bilirubin 0.0 MG/DL 


 


Aspartate Amino Transf


(AST/SGOT) 17 U/L 


 


 


Alanine Aminotransferase


(ALT/SGPT) 14 U/L 


 


 


Alkaline Phosphatase 103 U/L 


 


Total Creatine Kinase 89 U/L 


 


Total Protein 6.8 GM/DL 


 


Albumin 3.3 GM/DL 


 


Amylase Level 29 U/L 


 


Lipase 74 U/L 


 


Thyroid Stimulating Hormone


3rd Gen 1.950 uIU/ML 


 


 


Human Chorionic Gonadotropin,


Quant 3 MIU/ML 


 


 


Salicylates Level 13.0 MG/DL 


 


Acetaminophen Level


  LESS THAN 2.0


MCG/ML


 


Ethyl Alcohol Level


  LESS THAN 3


MG/DL


 


B-Hydroxybutyrate 0.07 MMOL/L 











[facility]


Medical Screen Exam Complete:  Yes


Emergency Medical Condition:  Yes


Medical Record Reviewed:  Yes


EKG Prior to Arrival:  Yes


Differential Diagnosis


Intracranial bleed, intrathoracic injury, intrabdominal injury, cervical 

fracture


Narrative Course


3:11 PM given patient's poor GCS status initially I decided to intubate her to 

protect her airway.  Please refer to my procedure note regarding this.  The 

trauma surgeon was present and together patient was inspected as a secondary 

infection.  No external injuries anteriorly was noticed.  The trauma surgeon 

put a right femoral central line.  Please refer to his procedure note.  Patient'

s blood pressure continued to be stable.  She was rolled off the backboard and 

contusions on her upper back was noticed along with the superficial laceration 

perirectally at 5 o'clock position.  There was a significant step off at C7-T1.

  Patient remained hemodynamically stable when left the trauma bay to go for CT 

scan.  The trauma surgeon assisted her.  Portable chest x-ray confirmed tube 

position.  The trauma surgeon just called me to let me note that there were no 

internal injuries and he will wanted the patient to be admitted medically.  He 

works sign off on the trauma since as per him this is a medical case given the 

overdose.  I put a call out for the intensivist for patient's admission.  

Patient was given 1 L of IV fluid bolus.


Critical Care Narrative


Aggregate critical care time was 45 minutes. Time to perform other separately 

billable procedures was not  


included in the critical care time. My time did not include minutes spent 

treating any other patients simultaneously or on  


activities that did not directly contribute to the patient's treatment.  





The services I provided to this patient were to treat and/or prevent clinically 

significant deterioration that could result  


in:  Unresponsive, respiratory failure





I provided critical care services requiring my management, as noted below:


Chart data review, documentation time, medication orders and management, vital 

sign assessments/reviewing monitor data,  


ordering and reviewing lab tests, ordering and interpreting/reviewing x-rays 

and diagnostic studies, care of the patient  


and discussion of the patient with the admitting physicians.


Procedures


**Procedure Narrative**


After the risks and benefits were discussed the following procedure was 

performed:


INTUBATION: The patient was put in optimal position for the procedure.  Rapid 

sequence intubation was initiated by me using  


40 milligrams of etomidate IV and 200 milligrams of succinylcholine IV.  The 

patient was intubated with a 7.5 cuffed endotracheal  


tube.  Tube placement was confirmed by visualization of the tube and balloon 

passing through the cords, capnometry and  


subsequent chest x-ray.  Breath sounds were equal and well aerated bilaterally 

postintubation.  No breath sounds over  


stomach.  Patient tolerated procedure well.





Emergency department E-FAST was performed with patient consent.


The curvilinear probe was used in the right upper quadrant/Morison's pouch, 

suprapubic, left upper quadrant/spleenorenal space, epigastric, parasternal 

long axis and anterior bilateral chest wall.  There was no evidence of 

peritoneal free fluid, pericardial effusion, or pneumothorax.





Trauma Alert - Level One


Trauma Alert Level One:  Full trauma team activate, Patient evaluated, Trauma 

surgeon summoned


Time Surgeon Summoned:  14:15





Physician Communication


Dr. Plummer, Dr. Hoang





Diagnosis


Diagnosis:  


 Primary Impression:  


 Unresponsive


 Additional Impressions:  


 Respiratory failure


 Qualified Codes:  J96.00 - Acute respiratory failure, unspecified whether with 

hypoxia or hypercapnia


 Overdose


 Qualified Codes:  T50.904A - Poisoning by unspecified drugs, medicaments and 

biological substances, undetermined, initial encounter


Admitting Physician Requests:  Admit


Scripts


Ferrous Sulfate (Ferosul) 325 Mg (65 Mg Iron) Tablet


325 MG PO BID@12,17 for iron deficiency anemia, #60 MG


   Prov: Bee Vo MD         11/6/17











Terrie Coleman MD Nov 4, 2017 14:53

## 2017-11-04 NOTE — RADRPT
EXAM DATE/TIME:  11/04/2017 14:54 

 

[facility] COMPARISON:     

No previous studies available for comparison.

 

 

INDICATIONS :     

Trauma alert; fall down stairs.

                      

 

RADIATION DOSE:     

42.68 CTDIvol (mGy) ; Patient body habitus

 

 

 

MEDICAL HISTORY :     

Non-responsive.  

 

SURGICAL HISTORY :      

Non-responsive. 

 

ENCOUNTER:      

Initial

 

ACUITY:      

1 day

 

PAIN SCALE:      

Non-responsive

 

LOCATION:       

Bilateral neck 

 

TECHNIQUE:     

Volumetric scanning of the cervical spine was performed. Multiplanar reconstructions in the sagittal,
 coronal and oblique axial planes were performed.   Using automated exposure control and adjustment o
f the mA and/or kV according to patient size, radiation dose was kept as low as reasonably achievable
 to obtain optimal diagnostic quality images.   DICOM format image data is available electronically f
or review and comparison.  

 

FINDINGS:     

I don't see an acute cortical break or trabecular disruption. Also nothing that looks like an acute s
ubluxation.

 

Patient has had previous posterior cervico-occipital and cervicothoracic fusion to the level of T2. A
lso discectomy and fusion procedure with anterior instrumentation from C2/C3-C5/C6. There is no solid
 bony bridging seen at C2/C3 but other levels appear solidly fused. There is a chronic appearing ante
rior compression deformity of the C5 vertebral body with an associated kyphotic deformity at C5/C6.

 

CONCLUSION:     

1. No acute fracture or acute appearing malalignment of the cervical spine.

2. Previous anterior and posterior fusion as above. No bony bridging at C2/C3 but the anterior fusion
 appears otherwise solid down to C6.

 

 

 

 Dash Andres MD on November 04, 2017 at 15:38           

Board Certified Radiologist.

 This report was verified electronically.

## 2017-11-04 NOTE — HHI.HP
History of Present Illness


Primary Care Physician


Unknown


Admission Diagnosis





unresponsive, respiratory failure, overdose


Diagnoses:  


History of Present Illness


42 year-old female according to the paramedic report likely overdose atarax-was 

found down at the bottom of the staircase.  Her GCS was 3 on arrival she did 

not respond to Marcaine she was difficult access so that so that I was inserted 

to a tibia she remained hemodynamically normal with the low GCS.  Open arrival 

she was orotracheally intubated.  Fast negative exam by external trauma right 

femoral vein IV Cordis access was obtained and patient brought was brought to 

CAT scan for her workup.





Review of Systems


ROS Limitations:  Clinical Condition, Intoxication, Intubated, Altered Mental 

Status, Unresponsive


Constitutional:  DENIES: Fever, Chills, Change in appetite


Endocrine:  DENIES: Heat/cold intolerance


Eyes:  DENIES: Blurred vision, Eye pain





Past Family Social History


Past Medical History


cannot be obtained


Past Surgical History


c spine fusion


Reported Medications


cannot be obtained


Family History


cannot be obtained


Social History


cannot be obtained





Physical Exam


Vital Signs





Vital Signs








  Date Time  Temp Pulse Resp B/P (MAP) Pulse Ox O2 Delivery O2 Flow Rate FiO2


 


17 15:08     100   100








Physical Exam


GENERAL: This is a well-nourished,nonresponsive


SKIN: No rashes, ecchymoses or lesions. Cool and dry.


HEAD: Atraumatic. Normocephalic.


EYES: Pupils equal round and reactive. Extraocular motions intact. 


ENT: Nose without bleeding. Airway patent.


NECK: Trachea midline. No JVD or lymphadenopathy. Supple,


CARDIOVASCULAR: Regular rate and rhythm without murmurs, gallops, or rubs. 


RESPIRATORY: Clear to auscultation. Breath sounds equal bilaterally. No wheezes

, rales, or rhonchi.  


GASTROINTESTINAL: Abdomen soft, , nondistended.  N


MUSCULOSKELETAL: Extremities without clubbing, cyanosis, or edema. No join, 

effusion, or edema 


NEUROLOGICAL: gcs 3.


Laboratory





Laboratory Tests








Test


  17


14:38


 


White Blood Count 9.0 


 


Red Blood Count 3.44 


 


Hemoglobin 8.4 


 


Bedside Hemoglobin 9.5 


 


Hematocrit 26.7 


 


Bedside Hematocrit 28.0 


 


Mean Corpuscular Volume 77.4 


 


Mean Corpuscular Hemoglobin 24.5 


 


Mean Corpuscular Hemoglobin


Concent 31.6 


 


 


Red Cell Distribution Width 18.6 


 


Platelet Count 342 


 


Mean Platelet Volume 6.5 


 


Neutrophils (%) (Auto) 82.6 


 


Lymphocytes (%) (Auto) 9.6 


 


Monocytes (%) (Auto) 1.7 


 


Eosinophils (%) (Auto) 5.1 


 


Basophils (%) (Auto) 1.0 


 


Neutrophils # (Auto) 7.4 


 


Lymphocytes # (Auto) 0.9 


 


Monocytes # (Auto) 0.2 


 


Eosinophils # (Auto) 0.5 


 


Basophils # (Auto) 0.1 


 


CBC Comment DIFF FINAL 


 


Differential Comment  


 


Prothrombin Time 10.6 


 


Prothromb Time International


Ratio 1.0 


 


 


Activated Partial


Thromboplast Time 20.1 


 


 


Bedside Sodium 130 


 


Bedside Potassium 4.7 


 


Bedside Chloride 105 


 


Bedside Blood Urea Nitrogen 10 


 


Bedside Creatinine 0.8 


 


Bedside Glucose 116 








Result Diagram:  


17





Imaging





Last 48 hours Impressions








Pelvis X-Ray 17 Signed





Impressions: 





 Service Date/Time:  2017 14:27 - CONCLUSION: Intact 





 pelvis.     Dash Andres MD 


 


Head CT 17 Signed





Impressions: 





 Service Date/Time:  2017 14:54 - CONCLUSION:  Negative 





 noncontrast head CT.     Dash Andres MD 


 


Chest X-Ray 17 Signed





Impressions: 





 Service Date/Time:  2017 14:27 - CONCLUSION:  Bilateral 





 airspace opacities, left worse than right. Endotracheal tube tip close to the 





 adalberto.     Dash Andres MD 











Capjuvenali VTE Risk Assessment


Caprini VTE Risk Assessment:  No/Low Risk (score <= 1)


Caprini Risk Assessment Model











 Point Value = 1          Point Value = 2  Point Value = 3  Point Value = 5


 


Age 41-60


Minor surgery


BMI > 25 kg/m2


Swollen legs


Varicose veins


Pregnancy or postpartum


History of unexplained or recurrent


   spontaneous 


Oral contraceptives or hormone


   replacement


Sepsis (< 1 month)


Serious lung disease, including


   pneumonia (< 1 month)


Abnormal pulmonary function


Acute myocardial infarction


Congestive heart failure (< 1 month)


History of inflammatory bowel disease


Medical patient at bed rest Age 61-74


Arthroscopic surgery


Major open surgery (> 45 min)


Laparoscopic surgery (> 45 min)


Malignancy


Confined to bed (> 72 hours)


Immobilizing plaster cast


Central venous access Age >= 75


History of VTE


Family history of VTE


Factor V Leiden


Prothrombin 76643M


Lupus anticoagulant


Anticardiolipin antibodies


Elevated serum homocysteine


Heparin-induced thrombocytopenia


Other congenital or acquired


   thrombophilia Stroke (< 1 month)


Elective arthroplasty


Hip, pelvis, or leg fracture


Acute spinal cord injury (< 1 month)








Prophylaxis Regimen











   Total Risk


Factor Score Risk Level Prophylaxis Regimen


 


0-1      Low Early ambulation


 


2 Moderate Order ONE of the following:


*Sequential Compression Device (SCD)


*Heparin 5000 units SQ BID


 


3-4 Higher Order ONE of the following medications:


*Heparin 5000 units SQ TID


*Enoxaparin/Lovenox 40 mg SQ daily (WT < 150 kg, CrCl > 30 mL/min)


*Enoxaparin/Lovenox 30 mg SQ daily (WT < 150 kg, CrCl > 10-29 mL/min)


*Enoxaparin/Lovenox 30 mg SQ BID (WT < 150 kg, CrCl > 30 mL/min)


AND/OR


*Sequential Compression Device (SCD)


 


5 or more Highest Order ONE of the following medications:


*Heparin 5000 units SQ TID (Preferred with Epidurals)


*Enoxaparin/Lovenox 40 mg SQ daily (WT < 150 kg, CrCl > 30 mL/min)


*Enoxaparin/Lovenox 30 mg SQ daily (WT < 150 kg, CrCl > 10-29 mL/min)


*Enoxaparin/Lovenox 30 mg SQ BID (WT < 150 kg, CrCl > 30 mL/min)


AND


*Sequential Compression Device (SCD)











Assessment and Plan


Assessment and Plan


Drug overdose-atarax-resulting in coma


No traumatic injuries


Orotracheal intubation follow GCS


Right femoral central line for poor access





Admit to medical intensivist











Sruthi Plummer MD 2017 15:54

## 2017-11-04 NOTE — RADRPT
EXAM DATE/TIME:  11/04/2017 14:54 

 

[facility] COMPARISON:     

No previous studies available for comparison.

 

 

INDICATIONS :     

Trauma alert; fall down stairs.

                      

 

RADIATION DOSE:     

56.35 CTDIvol (mGy) 

 

 

 

MEDICAL HISTORY :     

Non-responsive.  

 

SURGICAL HISTORY :      

Non-responsive. 

 

ENCOUNTER:      

Initial

 

ACUITY:      

1 day

 

PAIN SCALE:      

Non-responsive

 

LOCATION:        

cranial 

 

TECHNIQUE:     

Multiple contiguous axial images were obtained of the head.  Using automated exposure control and adj
ustment of the mA and/or kV according to patient size, radiation dose was kept as low as reasonably a
chievable to obtain optimal diagnostic quality images.   DICOM format image data is available electro
nically for review and comparison.  

 

FINDINGS:     

 

CEREBRUM:     

The ventricles are normal for age.  No evidence of midline shift, mass lesion, hemorrhage or acute in
farction.  No extra-axial fluid collections are seen.

 

POSTERIOR FOSSA:     

The cerebellum and brainstem are intact.  The 4th ventricle is midline.  The cerebellopontine angle i
s unremarkable.

 

EXTRACRANIAL:     

The visualized portion of the orbits is intact.

 

SKULL:     

The calvaria is intact.  No evidence of skull fracture.

 

CONCLUSION:     

Negative noncontrast head CT.

 

 

 

 Dash Andres MD on November 04, 2017 at 15:29           

Board Certified Radiologist.

 This report was verified electronically.

## 2017-11-04 NOTE — PD.CONS
[facility]


Service


Critical Care Medicine


Consult Requested By


Dr. Plummer


Reason for Consult


Critical care management status post fall/possible overdose with hydroxyzine


Primary Care Physician


Unknown


History of Present Illness


This is a 42-year-old  female.  Actual name is rYn Yanes.  Date of 

admission 11/4/2017.  Date of consultation 11/4/2017.  She has a history of 

gastroesophageal reflux disease, diarrhea and unknown psychiatric history.  She 

is on mirtazapine, fluoxetine, gabapentin, benztropine, hydroxyzine, loperamide 

and pantoprazole.  She presented to [place] ED as a trauma alert 1 status post 

fall down unknown amount of steps and was found by boyfriend with an almost 

empty bottle of Atarax 50 mg tablets #60.





Upon arrival patient was collared on a backboard protecting her airway.  

Patient was intubated with 7.5 ET tube@receiving 40 mg etomidate 200 mg 

succinylcholine.  The trauma surgeon put a right femoral Cordis central line.  

Please refer to his procedure note.  Patient's blood pressure continued to be 

stable.  According to the records, there is noted to be contusions on her upper 

back was noticed along with the superficial laceration perirectally at 5 o'

clock position.  There was a significant step off at C7-T1.  Images negative.  

Trauma surgery signed off on the trauma since as per him this is a medical case 

given the overdose. 





CT C-spine revealed no acute fracture.  Previous posterior cervical occipital 

cervical thoracic fusion at the level TII.  Also discectomy and fusion 

procedure with anterior instrumentation from C2 through C6.  Chronic appearing 

anterior compression fracture at C5.  Noted kyphotic deformity C5/C6.





Review of Systems


ROS Limitations:  Intubated





Past Family Social History


Past Medical History


Depression


Gastroesophageal reflux disease


Diarrhea


Past Surgical History


Prior cervicooccipital/cervicothoracic fusion.  Prior discectomy/fusion with 

anterior instrumentation C3 to C6.


Reported Medications


Mirtazapine 15 mg by mouth daily


Fluoxetine 40 mg by mouth daily


Pantoprazole 40 mg by mouth daily


Gabapentin 600 mg by mouth 4 times a day


Benztropine 1 mg twice a day


Loperamide 2 mg as needed


Hydroxyzine 50 mg by mouth twice a day


Active Ordered Medications


Reviewed in EMR


Family History


Unknown/unobtainable.  Family is currently unavailable


Social History


Unknown if any prior alcohol/tobacco or illicit drug use.  Urine toxicology 

screen pending.





Physical Exam


Vital Signs





Vital Signs








  Date Time  Temp Pulse Resp B/P (MAP) Pulse Ox O2 Delivery O2 Flow Rate FiO2


 


11/4/17 15:08     100   100








Physical Exam


GENERAL: 42 year old  female currently in c-collar


SKIN: Warm and dry.  We'll perfused


HEAD: Atraumatic. Normocephalic. 


EYES: Pupils equal and round about 5 mm bilaterally and reactive. No scleral 

icterus. No injection or drainage. 


ENT: No nasal bleeding or discharge.  Mucous membranes pink and moist.  

Orotracheally intubated


NECK: Trachea midline. No JVD.  Currently in c-collar


CARDIOVASCULAR: Tachycardic, RR.  S1, S2.  No S4.  Without murmur


RESPIRATORY: Distant but essentially Clear to auscultation. Breath sounds equal 

bilaterally. 


GASTROINTESTINAL: Abdomen soft, non-tender, obese.  Hypoactive bowel sounds are 

appreciated


MUSCULOSKELETAL: Extremities without difficulty and peripheral edema. No 

obvious deformities. 


NEUROLOGICAL: Currently sedated on the ventilator propofol drip at 50 mu./kg 

per minute.  Positive gag.  Positive corneal reflux.  Currently not withdrawing 

to pain.


Laboratory





Laboratory Tests








Test


  11/4/17


14:38


 


White Blood Count 9.0 


 


Red Blood Count 3.44 


 


Hemoglobin 8.4 


 


Bedside Hemoglobin 9.5 


 


Hematocrit 26.7 


 


Bedside Hematocrit 28.0 


 


Mean Corpuscular Volume 77.4 


 


Mean Corpuscular Hemoglobin 24.5 


 


Mean Corpuscular Hemoglobin


Concent 31.6 


 


 


Red Cell Distribution Width 18.6 


 


Platelet Count 342 


 


Mean Platelet Volume 6.5 


 


Neutrophils (%) (Auto) 82.6 


 


Lymphocytes (%) (Auto) 9.6 


 


Monocytes (%) (Auto) 1.7 


 


Eosinophils (%) (Auto) 5.1 


 


Basophils (%) (Auto) 1.0 


 


Neutrophils # (Auto) 7.4 


 


Lymphocytes # (Auto) 0.9 


 


Monocytes # (Auto) 0.2 


 


Eosinophils # (Auto) 0.5 


 


Basophils # (Auto) 0.1 


 


CBC Comment DIFF FINAL 


 


Differential Comment  


 


Prothrombin Time 10.6 


 


Prothromb Time International


Ratio 1.0 


 


 


Activated Partial


Thromboplast Time 20.1 


 


 


Bedside Sodium 130 


 


Bedside Potassium 4.7 


 


Bedside Chloride 105 


 


Bedside Blood Urea Nitrogen 10 


 


Bedside Creatinine 0.8 


 


Bedside Glucose 116 








Result Diagram:  


11/4/17 1438





Imaging





Last Impressions








Pelvis X-Ray 11/4/17 1439 Signed





Impressions: 





 Service Date/Time:  Saturday, November 4, 2017 14:27 - CONCLUSION: Intact 





 pelvis.     Dash Andres MD 


 


Head CT 11/4/17 1439 Signed





Impressions: 





 Service Date/Time:  Saturday, November 4, 2017 14:54 - CONCLUSION:  Negative 





 noncontrast head CT.     Dash Andres MD 


 


Chest X-Ray 11/4/17 1439 Signed





Impressions: 





 Service Date/Time:  Saturday, November 4, 2017 14:27 - CONCLUSION:  Bilateral 





 airspace opacities, left worse than right. Endotracheal tube tip close to the 





 adalberto.     Dash Andres MD 


 


Chest CT 11/4/17 1439 Signed





Impressions: 





 Service Date/Time:  Saturday, November 4, 2017 14:58 - CONCLUSION:  Mild 





 airspace consolidation of both lungs and tiny right, very small left pleural 





 effusions.     Dash Andres MD 


 


Cervical Spine CT 11/4/17 1439 Signed





Impressions: 





 Service Date/Time:  Saturday, November 4, 2017 14:54 - CONCLUSION:  1. No 

acute 





 fracture or acute appearing malalignment of the cervical spine. 2. Previous 





 anterior and posterior fusion as above. No bony bridging at C2/C3 but the 





 anterior fusion appears otherwise solid down to C6.     Dash Andres MD 


 


Abdomen/Pelvis CT 11/4/17 1439 Signed





Impressions: 





 Service Date/Time:  Saturday, November 4, 2017 14:56 - CONCLUSION:  No 

evidence 





 of acute trauma the abdomen or pelvis. Large stool throughout the colon.     





 Dash Andres MD 











Assessment and Plan


Assessment and Plan


Neuro/Psych:





Acute altered mental status possible hydroxyzine overdose


Prior cervical discectomy/fusion





CT brain 11/4 revealed no acute intracranial findings


CT C-spine revealed previous posterior cervicooccipital and cervico thoracic 

fusion at the level T2.  Discectomy and fusion procedure with anterior 

instrumentation of C2 through C6.  Chronic appearing anterior compression 

deformity of the C5 vertebral body with kyphotic deformity at C5/C6.


Currently on propofol/fentanyl drips for sedation/analgesia while intubated


Goal of RA SS -2


Daily sedation vacation


Urine drug screen currently pending along with EtOH, salicylates and 

acetaminophen





Holding home medications of mirtazapine 15 mg, fluoxetine 40 mg daily, 

gabapentin 600 mg 4 times a day, been serving 1 mg twice a day and hydroxyzine 

50 mg twice a day


Check gabapentin level





Poison control be notified





CV:





Sinus tachycardia


Hypertension





Patient is currently normal saline at 84 cc an hour.


Status post 1 L crystalloid in ED


Currently not requiring vasopressors and/or antihypertensives.


Follow-up EKG 





Resp:





Acute respiratory failure





[facility] 16/550/1/5/100


Ventilator bundle


Albuterol/ipratropium aerosols with albuterol aerosols every 2 hours.  Dyspnea


Spontaneous breathing trials daily


 


GI: 





Gastroesophageal reflux disease


History of diarrhea





NGT to LIWS


Pantoprazole for GI prophylaxis


Docusate sodium/senna 1 tablet twice a day for bowel regimen


Status post 50 g sorbitol and charcoal for gastric decontamination


Holding loperamide 2 mg as needed





:  





Meek catheter will be placed for accurate I's and O's in a critically ill 

patient





Endo:





Sliding-scale insulin if indicated to maintain euglycemia


Check TSH


  


Renal:





Creatinine currently within normal limits


Monitor urine output


Accurate I's and O's


 


Heme:





Microcytic anemia





Check iron studies/TIBC and ferritin.


No indications for transfusion of blood proximally at this time.





ID:





Monitor for infection





FEN:





Hyponatremia





Replace electrolytes as clinically indicated per ICU electrolyte protocol





MSK:





PT evaluate and treat





Access


- Utilize peripheral IV.  Remove right femoral Cordis once peripheral IVs 

established





Prophylaxis


- GI - pantoprazole


- DVT - SCD/heparin subcutaneous





Critical Care:


The total critical care time was 35 minutes. Time to perform other separately 

billable procedures was not included in the critical care time.


Code Status


Full code


Discussed Condition With


Dr. Hoang.  Care plan discussed and all questions answered











Jose Wilson MD Nov 4, 2017 16:05

## 2017-11-04 NOTE — PD
HPI


Chief Complaint:  trauma alert


Time Seen by Provider:  14:28


Travel History


International Travel<30 days:  No


Contact w/Intl Traveler<30days:  No


Traveled to known affect area:  No





History of Present Illness


HPI


Patient with unknown ID was brought in as a trauma alert by the paramedic.  I 

was in the trauma bay waiting for the patient's arrival.  Initially the patient 

was found laying on the floor by her boyfriend in her house at the bottom of 

the stairs with both legs tucked under her bottom.  Patient was GCS of 3 upon 

their arrival.  They tried to give her Narcan without any reversal.  No 

external injuries were noticed.  There was an empty bottle of hydroxyzine 

noticed next to her that was filled recently and only 2 pills left in the 

bottle.  She was maintaining her airway.  They brought her in boarded and 

collared.  Patient obviously is unable to give any history.  She was 

hemodynamically stable and GCS continued to be 3.





PFSH


Past Medical History


*** Narrative Medical


Unknown





Allergies-Medications


(Allergen,Severity, Reaction):  


Coded Allergies:  


     Sulfa (Sulfonamide Antibiotics) (Verified  Allergy, Severe, 11/6/17)


     azithromycin (Verified  Allergy, Severe, 11/6/17)


     ondansetron (Verified  Allergy, Severe, Swelling, 11/5/17)


     cyclobenzaprine (Verified  Allergy, Intermediate, 11/5/17)


     propoxyphene (Verified  Allergy, Intermediate, 11/5/17)


Comments


Unknown


Reported Meds & Prescriptions





Reported Meds & Active Scripts


Active


Reported


Loperamide (Loperamide HCl) 2 Mg Cap 2 Mg PO AS DIRECTED PRN


     One capsule after each loose stool.


     Not to exceed 8 capsules per day.


Pantoprazole (Pantoprazole Sodium) 40 Mg Tab 40 Mg PO BID


Gabapentin 600 Mg Tab 600 Mg PO QID


Fluoxetine (Fluoxetine HCl) 40 Mg Cap 40 Cap PO DAILY


Mirtazapine 15 Mg Tab 15 Mg PO HS





Narrative Medication


Unknown





Review of Systems


ROS Limitations:  Unresponsive


Except as stated in HPI:  all other systems reviewed are Neg





Physical Exam


Narrative


GENERAL: Unresponsive, boarded and collared


SKIN: Focused skin assessment warm/dry.  Contusions on the upper back


HEAD: Atraumatic. Normocephalic. 


EYES: Pupils equal and round. No scleral icterus. No injection or drainage. 


ENT: No nasal bleeding or discharge.  Mucous membranes pink and moist.


NECK: Trachea midline. No JVD. 


CARDIOVASCULAR: Regular rate and rhythm.  No murmur appreciated.


RESPIRATORY: No accessory muscle use. Clear to auscultation. Breath sounds 

equal bilaterally. 


GASTROINTESTINAL: Abdomen soft, non-tender, nondistended. Hepatic and splenic 

margins not palpable.  Perirectal laceration at 5 o'clock position


MUSCULOSKELETAL: No obvious deformities. No clubbing.  No cyanosis.  No edema. 


NEUROLOGICAL: GCS of 3


PSYCHIATRIC: Unable to assess





Data


Data


Last Documented VS





Vital Signs








  Date Time  Temp Pulse Resp B/P (MAP) Pulse Ox O2 Delivery O2 Flow Rate FiO2


 


11/4/17 15:08     100   100








Orders





 Orders


I-Stat Profile (11/4/17 14:39)


I-Stat Creatinine (11/4/17 14:39)


Complete Blood Count With Diff (11/4/17 14:39)


Prothrombin Time / Inr (Pt) (11/4/17 14:39)


Act Partial Throm Time (Ptt) (11/4/17 14:39)


Type And Screen (11/4/17 14:39)


Chest, Single Ap (11/4/17 14:39)


Pelvis, Ap Only (Routine) (11/4/17 14:39)


Ct Brain W/O Iv Contrast(Rout) (11/4/17 14:39)


Ct Cerv Spine W/O Contrast (11/4/17 14:39)


Ct Abd/Pel W Iv Contrast(Rout) (11/4/17 14:39)


Ct Thorax/ Chest W Iv Contrast (11/4/17 14:39)


Iv Access Insert/Monitor (11/4/17 14:39)


Ecg Monitoring (11/4/17 14:39)


Oximetry (11/4/17 14:39)


Oxygen Administration (11/4/17 14:39)


Propofol 1000 Mg/100 Ml Inj (Diprivan 10 (11/4/17 14:40)


Admit Order (Ed Use Only) (11/4/17 15:17)





Labs





Laboratory Tests








Test


  11/4/17


14:38


 


White Blood Count 9.0 TH/MM3 


 


Red Blood Count 3.44 MIL/MM3 


 


Hemoglobin 8.4 GM/DL 


 


Bedside Hemoglobin 9.5 G/DL 


 


Hematocrit 26.7 % 


 


Bedside Hematocrit 28.0 % 


 


Mean Corpuscular Volume 77.4 FL 


 


Mean Corpuscular Hemoglobin 24.5 PG 


 


Mean Corpuscular Hemoglobin


Concent 31.6 % 


 


 


Red Cell Distribution Width 18.6 % 


 


Platelet Count 342 TH/MM3 


 


Mean Platelet Volume 6.5 FL 


 


Neutrophils (%) (Auto) 82.6 % 


 


Lymphocytes (%) (Auto) 9.6 % 


 


Monocytes (%) (Auto) 1.7 % 


 


Eosinophils (%) (Auto) 5.1 % 


 


Basophils (%) (Auto) 1.0 % 


 


Neutrophils # (Auto) 7.4 TH/MM3 


 


Lymphocytes # (Auto) 0.9 TH/MM3 


 


Monocytes # (Auto) 0.2 TH/MM3 


 


Eosinophils # (Auto) 0.5 TH/MM3 


 


Basophils # (Auto) 0.1 TH/MM3 


 


CBC Comment DIFF FINAL 


 


Differential Comment  


 


Prothrombin Time 10.6 SEC 


 


Prothromb Time International


Ratio 1.0 RATIO 


 


 


Activated Partial


Thromboplast Time 20.1 SEC 


 


 


Bedside Sodium 130 MMOL/L 


 


Bedside Potassium 4.7 MMOL/L 


 


Bedside Chloride 105 MMOL/L 


 


Bedside Blood Urea Nitrogen 10 MG/DL 


 


Bedside Creatinine 0.8 MG/DL 


 


Bedside Glucose 116 MG/DL 


 


Phosphorus Level 3.6 MG/DL 


 


Magnesium Level 1.6 MG/DL 


 


Iron Level 26 MCG/DL 


 


Total Iron Binding Capacity 447 MCG/DL 


 


Percent Iron Saturation 5.8 % 


 


Transferrin 319 MG/DL 


 


Ferritin 12 NG/ML 


 


Total Bilirubin 0.1 MG/DL 


 


Direct Bilirubin 0.1 MG/DL 


 


Indirect Bilirubin 0.0 MG/DL 


 


Aspartate Amino Transf


(AST/SGOT) 17 U/L 


 


 


Alanine Aminotransferase


(ALT/SGPT) 14 U/L 


 


 


Alkaline Phosphatase 103 U/L 


 


Total Creatine Kinase 89 U/L 


 


Total Protein 6.8 GM/DL 


 


Albumin 3.3 GM/DL 


 


Amylase Level 29 U/L 


 


Lipase 74 U/L 


 


Thyroid Stimulating Hormone


3rd Gen 1.950 uIU/ML 


 


 


Human Chorionic Gonadotropin,


Quant 3 MIU/ML 


 


 


Salicylates Level 13.0 MG/DL 


 


Acetaminophen Level


  LESS THAN 2.0


MCG/ML


 


Ethyl Alcohol Level


  LESS THAN 3


MG/DL


 


B-Hydroxybutyrate 0.07 MMOL/L 











[facility]


Medical Screen Exam Complete:  Yes


Emergency Medical Condition:  Yes


Medical Record Reviewed:  Yes


EKG Prior to Arrival:  Yes


Differential Diagnosis


Intracranial bleed, intrathoracic injury, intrabdominal injury, cervical 

fracture


Narrative Course


3:11 PM given patient's poor GCS status initially I decided to intubate her to 

protect her airway.  Please refer to my procedure note regarding this.  The 

trauma surgeon was present and together patient was inspected as a secondary 

infection.  No external injuries anteriorly was noticed.  The trauma surgeon 

put a right femoral central line.  Please refer to his procedure note.  Patient'

s blood pressure continued to be stable.  She was rolled off the backboard and 

contusions on her upper back was noticed along with the superficial laceration 

perirectally at 5 o'clock position.  There was a significant step off at C7-T1.

  Patient remained hemodynamically stable when left the trauma bay to go for CT 

scan.  The trauma surgeon assisted her.  Portable chest x-ray confirmed tube 

position.  The trauma surgeon just called me to let me note that there were no 

internal injuries and he will wanted the patient to be admitted medically.  He 

works sign off on the trauma since as per him this is a medical case given the 

overdose.  I put a call out for the intensivist for patient's admission.  

Patient was given 1 L of IV fluid bolus.


Critical Care Narrative


Aggregate critical care time was 45 minutes. Time to perform other separately 

billable procedures was not  


included in the critical care time. My time did not include minutes spent 

treating any other patients simultaneously or on  


activities that did not directly contribute to the patient's treatment.  





The services I provided to this patient were to treat and/or prevent clinically 

significant deterioration that could result  


in:  Unresponsive, respiratory failure





I provided critical care services requiring my management, as noted below:


Chart data review, documentation time, medication orders and management, vital 

sign assessments/reviewing monitor data,  


ordering and reviewing lab tests, ordering and interpreting/reviewing x-rays 

and diagnostic studies, care of the patient  


and discussion of the patient with the admitting physicians.


Procedures


**Procedure Narrative**


After the risks and benefits were discussed the following procedure was 

performed:


INTUBATION: The patient was put in optimal position for the procedure.  Rapid 

sequence intubation was initiated by me using  


40 milligrams of etomidate IV and 200 milligrams of succinylcholine IV.  The 

patient was intubated with a 7.5 cuffed endotracheal  


tube.  Tube placement was confirmed by visualization of the tube and balloon 

passing through the cords, capnometry and  


subsequent chest x-ray.  Breath sounds were equal and well aerated bilaterally 

postintubation.  No breath sounds over  


stomach.  Patient tolerated procedure well.





Emergency department E-FAST was performed with patient consent.


The curvilinear probe was used in the right upper quadrant/Morison's pouch, 

suprapubic, left upper quadrant/spleenorenal space, epigastric, parasternal 

long axis and anterior bilateral chest wall.  There was no evidence of 

peritoneal free fluid, pericardial effusion, or pneumothorax.





Trauma Alert - Level One


Trauma Alert Level One:  Full trauma team activate, Patient evaluated, Trauma 

surgeon summoned


Time Surgeon Summoned:  14:15





Physician Communication


Dr. Plummer, Dr. Hoang





Diagnosis


Diagnosis:  


 Primary Impression:  


 Unresponsive


 Additional Impressions:  


 Respiratory failure


 Qualified Codes:  J96.00 - Acute respiratory failure, unspecified whether with 

hypoxia or hypercapnia


 Overdose


 Qualified Codes:  T50.904A - Poisoning by unspecified drugs, medicaments and 

biological substances, undetermined, initial encounter


Admitting Physician Requests:  Admit


Scripts


Ferrous Sulfate (Ferosul) 325 Mg (65 Mg Iron) Tablet


325 MG PO BID@12,17 for iron deficiency anemia, #60 MG


   Prov: Bee Vo MD         11/6/17











eTrrie Coleman MD Nov 4, 2017 14:53

## 2017-11-04 NOTE — RADRPT
EXAM DATE/TIME:  11/04/2017 14:56 

 

[facility] COMPARISON:     

No previous studies available for comparison.

 

 

INDICATIONS :     

Trauma alert; fall down stairs.

                      

 

IV CONTRAST:     

75 cc Omnipaque 350 (iohexol) IV ; Cumulative dose for multiple exams.

 

 

ORAL CONTRAST:      

No oral contrast ingested.

                      

 

RADIATION DOSE:     

12.92 CTDIvol (mGy) ; Combined studies - Thorax/Abdomen/Pelvis

 

 

MEDICAL HISTORY :     

Non-responsive.  

 

SURGICAL HISTORY :      

Non-responsive. 

 

ENCOUNTER:      

Initial

 

ACUITY:      

1 day

 

PAIN SCALE:      

Non-responsive

 

LOCATION:       

Bilateral  abdomen

 

TECHNIQUE:     

Volumetric scanning of the abdomen and pelvis was performed.  Using automated exposure control and ad
justment of the mA and/or kV according to patient size, radiation dose was kept as low as reasonably 
achievable to obtain optimal diagnostic quality images.  DICOM format image data is available electro
nically for review and comparison.  

 

FINDINGS:     

 

LIVER:     

Homogeneous density without lesion.  There is no dilation of the biliary tree.  No calcified gallston
es.

 

SPLEEN:     

Normal size without lesion.

 

PANCREAS:     

Within normal limits.

 

KIDNEYS:     

Normal in size and shape.  There is no mass, stone or hydronephrosis.

 

ADRENAL GLANDS:     

Within normal limits.

 

VASCULAR:     

There is no aortic aneurysm. There is a right femoral vein catheter

 

BOWEL/MESENTERY:     

The stomach, small bowel, and colon demonstrate no acute abnormality.  There is no free intraperitone
al air or fluid. Large amount of stool throughout the colon.

 

ABDOMINAL WALL:     

Within normal limits.

 

RETROPERITONEUM:     

There is no lymphadenopathy. 

 

BLADDER:     

No wall thickening or mass. 

 

REPRODUCTIVE:     

Within normal limits.

 

INGUINAL:     

There is no lymphadenopathy or hernia. 

 

MUSCULOSKELETAL:     

Within normal limits for patient age. 

 

CONCLUSION:     

No evidence of acute trauma the abdomen or pelvis. Large stool throughout the colon.

 

 

 

 Dash Andres MD on November 04, 2017 at 15:43           

Board Certified Radiologist.

 This report was verified electronically.

## 2017-11-04 NOTE — PD.CONS
[facility]


Service


Critical Care Medicine


Consult Requested By


Dr. Plummer


Reason for Consult


Critical care management status post fall/possible overdose with hydroxyzine


Primary Care Physician


Unknown


History of Present Illness


This is a 42-year-old  female.  Actual name is Yrn Yanes.  Date of 

admission 11/4/2017.  Date of consultation 11/4/2017.  She has a history of 

gastroesophageal reflux disease, diarrhea and unknown psychiatric history.  She 

is on mirtazapine, fluoxetine, gabapentin, benztropine, hydroxyzine, loperamide 

and pantoprazole.  She presented to [place] ED as a trauma alert 1 status post 

fall down unknown amount of steps and was found by boyfriend with an almost 

empty bottle of Atarax 50 mg tablets #60.





Upon arrival patient was collared on a backboard protecting her airway.  

Patient was intubated with 7.5 ET tube@receiving 40 mg etomidate 200 mg 

succinylcholine.  The trauma surgeon put a right femoral Cordis central line.  

Please refer to his procedure note.  Patient's blood pressure continued to be 

stable.  According to the records, there is noted to be contusions on her upper 

back was noticed along with the superficial laceration perirectally at 5 o'

clock position.  There was a significant step off at C7-T1.  Images negative.  

Trauma surgery signed off on the trauma since as per him this is a medical case 

given the overdose. 





CT C-spine revealed no acute fracture.  Previous posterior cervical occipital 

cervical thoracic fusion at the level TII.  Also discectomy and fusion 

procedure with anterior instrumentation from C2 through C6.  Chronic appearing 

anterior compression fracture at C5.  Noted kyphotic deformity C5/C6.





Review of Systems


ROS Limitations:  Intubated





Past Family Social History


Past Medical History


Depression


Gastroesophageal reflux disease


Diarrhea


Past Surgical History


Prior cervicooccipital/cervicothoracic fusion.  Prior discectomy/fusion with 

anterior instrumentation C3 to C6.


Reported Medications


Mirtazapine 15 mg by mouth daily


Fluoxetine 40 mg by mouth daily


Pantoprazole 40 mg by mouth daily


Gabapentin 600 mg by mouth 4 times a day


Benztropine 1 mg twice a day


Loperamide 2 mg as needed


Hydroxyzine 50 mg by mouth twice a day


Active Ordered Medications


Reviewed in EMR


Family History


Unknown/unobtainable.  Family is currently unavailable


Social History


Unknown if any prior alcohol/tobacco or illicit drug use.  Urine toxicology 

screen pending.





Physical Exam


Vital Signs





Vital Signs








  Date Time  Temp Pulse Resp B/P (MAP) Pulse Ox O2 Delivery O2 Flow Rate FiO2


 


11/4/17 15:08     100   100








Physical Exam


GENERAL: 42 year old  female currently in c-collar


SKIN: Warm and dry.  We'll perfused


HEAD: Atraumatic. Normocephalic. 


EYES: Pupils equal and round about 5 mm bilaterally and reactive. No scleral 

icterus. No injection or drainage. 


ENT: No nasal bleeding or discharge.  Mucous membranes pink and moist.  

Orotracheally intubated


NECK: Trachea midline. No JVD.  Currently in c-collar


CARDIOVASCULAR: Tachycardic, RR.  S1, S2.  No S4.  Without murmur


RESPIRATORY: Distant but essentially Clear to auscultation. Breath sounds equal 

bilaterally. 


GASTROINTESTINAL: Abdomen soft, non-tender, obese.  Hypoactive bowel sounds are 

appreciated


MUSCULOSKELETAL: Extremities without difficulty and peripheral edema. No 

obvious deformities. 


NEUROLOGICAL: Currently sedated on the ventilator propofol drip at 50 mu./kg 

per minute.  Positive gag.  Positive corneal reflux.  Currently not withdrawing 

to pain.


Laboratory





Laboratory Tests








Test


  11/4/17


14:38


 


White Blood Count 9.0 


 


Red Blood Count 3.44 


 


Hemoglobin 8.4 


 


Bedside Hemoglobin 9.5 


 


Hematocrit 26.7 


 


Bedside Hematocrit 28.0 


 


Mean Corpuscular Volume 77.4 


 


Mean Corpuscular Hemoglobin 24.5 


 


Mean Corpuscular Hemoglobin


Concent 31.6 


 


 


Red Cell Distribution Width 18.6 


 


Platelet Count 342 


 


Mean Platelet Volume 6.5 


 


Neutrophils (%) (Auto) 82.6 


 


Lymphocytes (%) (Auto) 9.6 


 


Monocytes (%) (Auto) 1.7 


 


Eosinophils (%) (Auto) 5.1 


 


Basophils (%) (Auto) 1.0 


 


Neutrophils # (Auto) 7.4 


 


Lymphocytes # (Auto) 0.9 


 


Monocytes # (Auto) 0.2 


 


Eosinophils # (Auto) 0.5 


 


Basophils # (Auto) 0.1 


 


CBC Comment DIFF FINAL 


 


Differential Comment  


 


Prothrombin Time 10.6 


 


Prothromb Time International


Ratio 1.0 


 


 


Activated Partial


Thromboplast Time 20.1 


 


 


Bedside Sodium 130 


 


Bedside Potassium 4.7 


 


Bedside Chloride 105 


 


Bedside Blood Urea Nitrogen 10 


 


Bedside Creatinine 0.8 


 


Bedside Glucose 116 








Result Diagram:  


11/4/17 1438





Imaging





Last Impressions








Pelvis X-Ray 11/4/17 1439 Signed





Impressions: 





 Service Date/Time:  Saturday, November 4, 2017 14:27 - CONCLUSION: Intact 





 pelvis.     Dash Andres MD 


 


Head CT 11/4/17 1439 Signed





Impressions: 





 Service Date/Time:  Saturday, November 4, 2017 14:54 - CONCLUSION:  Negative 





 noncontrast head CT.     Dash Andres MD 


 


Chest X-Ray 11/4/17 1439 Signed





Impressions: 





 Service Date/Time:  Saturday, November 4, 2017 14:27 - CONCLUSION:  Bilateral 





 airspace opacities, left worse than right. Endotracheal tube tip close to the 





 daalberto.     Dash Andres MD 


 


Chest CT 11/4/17 1439 Signed





Impressions: 





 Service Date/Time:  Saturday, November 4, 2017 14:58 - CONCLUSION:  Mild 





 airspace consolidation of both lungs and tiny right, very small left pleural 





 effusions.     Dash Andres MD 


 


Cervical Spine CT 11/4/17 1439 Signed





Impressions: 





 Service Date/Time:  Saturday, November 4, 2017 14:54 - CONCLUSION:  1. No 

acute 





 fracture or acute appearing malalignment of the cervical spine. 2. Previous 





 anterior and posterior fusion as above. No bony bridging at C2/C3 but the 





 anterior fusion appears otherwise solid down to C6.     Dash Andres MD 


 


Abdomen/Pelvis CT 11/4/17 1439 Signed





Impressions: 





 Service Date/Time:  Saturday, November 4, 2017 14:56 - CONCLUSION:  No 

evidence 





 of acute trauma the abdomen or pelvis. Large stool throughout the colon.     





 Dash Andres MD 











Assessment and Plan


Assessment and Plan


Neuro/Psych:





Acute altered mental status possible hydroxyzine overdose


Prior cervical discectomy/fusion





CT brain 11/4 revealed no acute intracranial findings


CT C-spine revealed previous posterior cervicooccipital and cervico thoracic 

fusion at the level T2.  Discectomy and fusion procedure with anterior 

instrumentation of C2 through C6.  Chronic appearing anterior compression 

deformity of the C5 vertebral body with kyphotic deformity at C5/C6.


Currently on propofol/fentanyl drips for sedation/analgesia while intubated


Goal of RA SS -2


Daily sedation vacation


Urine drug screen currently pending along with EtOH, salicylates and 

acetaminophen





Holding home medications of mirtazapine 15 mg, fluoxetine 40 mg daily, 

gabapentin 600 mg 4 times a day, been serving 1 mg twice a day and hydroxyzine 

50 mg twice a day


Check gabapentin level





Poison control be notified





CV:





Sinus tachycardia


Hypertension





Patient is currently normal saline at 84 cc an hour.


Status post 1 L crystalloid in ED


Currently not requiring vasopressors and/or antihypertensives.


Follow-up EKG 





Resp:





Acute respiratory failure





[facility] 16/550/1/5/100


Ventilator bundle


Albuterol/ipratropium aerosols with albuterol aerosols every 2 hours.  Dyspnea


Spontaneous breathing trials daily


 


GI: 





Gastroesophageal reflux disease


History of diarrhea





NGT to LIWS


Pantoprazole for GI prophylaxis


Docusate sodium/senna 1 tablet twice a day for bowel regimen


Status post 50 g sorbitol and charcoal for gastric decontamination


Holding loperamide 2 mg as needed





:  





Meek catheter will be placed for accurate I's and O's in a critically ill 

patient





Endo:





Sliding-scale insulin if indicated to maintain euglycemia


Check TSH


  


Renal:





Creatinine currently within normal limits


Monitor urine output


Accurate I's and O's


 


Heme:





Microcytic anemia





Check iron studies/TIBC and ferritin.


No indications for transfusion of blood proximally at this time.





ID:





Monitor for infection





FEN:





Hyponatremia





Replace electrolytes as clinically indicated per ICU electrolyte protocol





MSK:





PT evaluate and treat





Access


- Utilize peripheral IV.  Remove right femoral Cordis once peripheral IVs 

established





Prophylaxis


- GI - pantoprazole


- DVT - SCD/heparin subcutaneous





Critical Care:


The total critical care time was 35 minutes. Time to perform other separately 

billable procedures was not included in the critical care time.


Code Status


Full code


Discussed Condition With


Dr. Hoang.  Care plan discussed and all questions answered











Jose Wilson MD Nov 4, 2017 16:05

## 2017-11-05 VITALS — DIASTOLIC BLOOD PRESSURE: 87 MMHG | SYSTOLIC BLOOD PRESSURE: 177 MMHG

## 2017-11-05 VITALS — OXYGEN SATURATION: 100 %

## 2017-11-05 VITALS
HEART RATE: 93 BPM | SYSTOLIC BLOOD PRESSURE: 182 MMHG | RESPIRATION RATE: 20 BRPM | OXYGEN SATURATION: 100 % | TEMPERATURE: 99.5 F | DIASTOLIC BLOOD PRESSURE: 88 MMHG

## 2017-11-05 VITALS — HEART RATE: 89 BPM

## 2017-11-05 VITALS — OXYGEN SATURATION: 98 %

## 2017-11-05 VITALS — HEART RATE: 94 BPM

## 2017-11-05 VITALS — HEART RATE: 88 BPM

## 2017-11-05 VITALS — HEART RATE: 74 BPM

## 2017-11-05 VITALS — HEART RATE: 105 BPM

## 2017-11-05 VITALS — HEART RATE: 53 BPM

## 2017-11-05 VITALS — HEART RATE: 123 BPM

## 2017-11-05 VITALS — HEART RATE: 118 BPM

## 2017-11-05 VITALS — HEART RATE: 93 BPM

## 2017-11-05 VITALS — HEART RATE: 58 BPM

## 2017-11-05 VITALS — HEART RATE: 108 BPM

## 2017-11-05 LAB
% SATURATION IRON PROFILE: 5.8 % (ref 20–50)
ALBUMIN SERPL-MCNC: 3 GM/DL (ref 3.4–5)
ALP SERPL-CCNC: 117 U/L (ref 45–117)
ALT SERPL-CCNC: 13 U/L (ref 10–53)
AST SERPL-CCNC: 14 U/L (ref 15–37)
BASOPHILS # BLD AUTO: 0.1 TH/MM3 (ref 0–0.2)
BASOPHILS NFR BLD: 0.9 % (ref 0–2)
BILIRUB SERPL-MCNC: 0.2 MG/DL (ref 0.2–1)
BUN SERPL-MCNC: 6 MG/DL (ref 7–18)
CALCIUM SERPL-MCNC: 7.9 MG/DL (ref 8.5–10.1)
CALCIUM TP COR SERPL-MCNC: 8.5 MG/DL (ref 8.5–10.1)
CHLORIDE SERPL-SCNC: 111 MEQ/L (ref 98–107)
CREAT SERPL-MCNC: 0.59 MG/DL (ref 0.5–1)
EOSINOPHIL # BLD: 0.1 TH/MM3 (ref 0–0.4)
EOSINOPHIL NFR BLD: 1.9 % (ref 0–4)
ERYTHROCYTE [DISTWIDTH] IN BLOOD BY AUTOMATED COUNT: 18.9 % (ref 11.6–17.2)
FERRITIN SERPL-MCNC: 10 NG/ML (ref 8–252)
GFR SERPLBLD BASED ON 1.73 SQ M-ARVRAT: 88 ML/MIN (ref 89–?)
GLUCOSE SERPL-MCNC: 111 MG/DL (ref 74–106)
HCO3 BLD-SCNC: 20.9 MEQ/L (ref 21–32)
HCT VFR BLD CALC: 24.5 % (ref 35–46)
HGB BLD-MCNC: 7.9 GM/DL (ref 11.6–15.3)
INR PPP: 1 RATIO
IRON (FE): 22 MCG/DL (ref 50–170)
LYMPHOCYTES # BLD AUTO: 1.1 TH/MM3 (ref 1–4.8)
LYMPHOCYTES NFR BLD AUTO: 15.7 % (ref 9–44)
MAGNESIUM SERPL-MCNC: 1.7 MG/DL (ref 1.5–2.5)
MCH RBC QN AUTO: 25 PG (ref 27–34)
MCHC RBC AUTO-ENTMCNC: 32 % (ref 32–36)
MCV RBC AUTO: 78 FL (ref 80–100)
MONOCYTE #: 0.3 TH/MM3 (ref 0–0.9)
MONOCYTES NFR BLD: 4.7 % (ref 0–8)
NEUTROPHILS # BLD AUTO: 5.2 TH/MM3 (ref 1.8–7.7)
NEUTROPHILS NFR BLD AUTO: 76.8 % (ref 16–70)
PHOSPHATE SERPL-MCNC: 2.9 MG/DL (ref 2.5–4.9)
PLATELET # BLD: 313 TH/MM3 (ref 150–450)
PMV BLD AUTO: 6.5 FL (ref 7–11)
PROT SERPL-MCNC: 6 GM/DL (ref 6.4–8.2)
PROTHROMBIN TIME: 10.9 SEC (ref 9.8–11.6)
RBC # BLD AUTO: 3.15 MIL/MM3 (ref 4–5.3)
SODIUM SERPL-SCNC: 141 MEQ/L (ref 136–145)
TIBC SERPL-MCNC: 378 MCG/DL (ref 250–450)
WBC # BLD AUTO: 6.7 TH/MM3 (ref 4–11)

## 2017-11-05 RX ADMIN — IPRATROPIUM BROMIDE AND ALBUTEROL SULFATE SCH AMPULE: .5; 3 SOLUTION RESPIRATORY (INHALATION) at 03:39

## 2017-11-05 RX ADMIN — PHENYTOIN SODIUM SCH MLS/HR: 50 INJECTION INTRAMUSCULAR; INTRAVENOUS at 17:11

## 2017-11-05 RX ADMIN — POTASSIUM CHLORIDE PRN MLS/HR: 200 INJECTION, SOLUTION INTRAVENOUS at 09:17

## 2017-11-05 RX ADMIN — PANTOPRAZOLE SODIUM SCH MG: 40 INJECTION, POWDER, FOR SOLUTION INTRAVENOUS at 09:16

## 2017-11-05 RX ADMIN — PROMETHAZINE HYDROCHLORIDE PRN MG: 25 INJECTION INTRAMUSCULAR; INTRAVENOUS at 21:01

## 2017-11-05 RX ADMIN — CHLORHEXIDINE GLUCONATE SCH PACK: 500 CLOTH TOPICAL at 04:00

## 2017-11-05 RX ADMIN — PROPOFOL PRN MLS/HR: 10 INJECTION, EMULSION INTRAVENOUS at 01:41

## 2017-11-05 RX ADMIN — PHENYTOIN SODIUM SCH MLS/HR: 50 INJECTION INTRAMUSCULAR; INTRAVENOUS at 04:05

## 2017-11-05 RX ADMIN — POLYVINYL ALCOHOL SCH DROP: 14 SOLUTION/ DROPS OPHTHALMIC at 13:00

## 2017-11-05 RX ADMIN — BUTALBITAL, ACETAMINOPHEN, AND CAFFEINE PRN TAB: 50; 325; 40 TABLET ORAL at 21:01

## 2017-11-05 RX ADMIN — POLYVINYL ALCOHOL SCH DROP: 14 SOLUTION/ DROPS OPHTHALMIC at 18:00

## 2017-11-05 RX ADMIN — STANDARDIZED SENNA CONCENTRATE AND DOCUSATE SODIUM SCH TAB: 8.6; 5 TABLET, FILM COATED ORAL at 21:00

## 2017-11-05 RX ADMIN — IPRATROPIUM BROMIDE AND ALBUTEROL SULFATE SCH AMPULE: .5; 3 SOLUTION RESPIRATORY (INHALATION) at 22:35

## 2017-11-05 RX ADMIN — IPRATROPIUM BROMIDE AND ALBUTEROL SULFATE SCH AMPULE: .5; 3 SOLUTION RESPIRATORY (INHALATION) at 08:30

## 2017-11-05 RX ADMIN — HEPARIN SODIUM SCH UNITS: 10000 INJECTION, SOLUTION INTRAVENOUS; SUBCUTANEOUS at 17:10

## 2017-11-05 RX ADMIN — Medication SCH ML: at 21:03

## 2017-11-05 RX ADMIN — PROPOFOL PRN MLS/HR: 10 INJECTION, EMULSION INTRAVENOUS at 09:18

## 2017-11-05 RX ADMIN — ATENOLOL SCH MG: 50 TABLET ORAL at 17:11

## 2017-11-05 RX ADMIN — PROPOFOL PRN MLS/HR: 10 INJECTION, EMULSION INTRAVENOUS at 05:11

## 2017-11-05 RX ADMIN — POLYVINYL ALCOHOL SCH DROP: 14 SOLUTION/ DROPS OPHTHALMIC at 09:19

## 2017-11-05 RX ADMIN — CHLORHEXIDINE GLUCONATE 0.12% ORAL RINSE SCH ML: 1.2 LIQUID ORAL at 20:00

## 2017-11-05 RX ADMIN — HUMAN INSULIN SCH: 100 INJECTION, SOLUTION SUBCUTANEOUS at 12:00

## 2017-11-05 RX ADMIN — HUMAN INSULIN SCH: 100 INJECTION, SOLUTION SUBCUTANEOUS at 05:40

## 2017-11-05 RX ADMIN — HEPARIN SODIUM SCH UNITS: 10000 INJECTION, SOLUTION INTRAVENOUS; SUBCUTANEOUS at 23:33

## 2017-11-05 RX ADMIN — HEPARIN SODIUM SCH UNITS: 10000 INJECTION, SOLUTION INTRAVENOUS; SUBCUTANEOUS at 01:22

## 2017-11-05 RX ADMIN — Medication SCH ML: at 09:18

## 2017-11-05 RX ADMIN — HEPARIN SODIUM SCH UNITS: 10000 INJECTION, SOLUTION INTRAVENOUS; SUBCUTANEOUS at 09:17

## 2017-11-05 RX ADMIN — HUMAN INSULIN SCH: 100 INJECTION, SOLUTION SUBCUTANEOUS at 18:00

## 2017-11-05 RX ADMIN — STANDARDIZED SENNA CONCENTRATE AND DOCUSATE SODIUM SCH TAB: 8.6; 5 TABLET, FILM COATED ORAL at 09:17

## 2017-11-05 RX ADMIN — POTASSIUM CHLORIDE PRN MLS/HR: 200 INJECTION, SOLUTION INTRAVENOUS at 05:19

## 2017-11-05 RX ADMIN — CHLORHEXIDINE GLUCONATE 0.12% ORAL RINSE SCH ML: 1.2 LIQUID ORAL at 08:00

## 2017-11-05 RX ADMIN — IPRATROPIUM BROMIDE AND ALBUTEROL SULFATE SCH AMPULE: .5; 3 SOLUTION RESPIRATORY (INHALATION) at 16:00

## 2017-11-05 NOTE — HHI.CCPN
Subjective


Remarks/Hospital Course


This is a 42-year-old  female.  Actual name is Yrn Yanes.  Date of 

admission 11/4/2017.  Date of consultation 11/4/2017.  She has a history of 

gastroesophageal reflux disease, diarrhea and unknown psychiatric history.  She 

is on mirtazapine, fluoxetine, gabapentin, benztropine, hydroxyzine, loperamide 

and pantoprazole.  She presented to [place] ED as a trauma alert 1 status post 

fall down unknown amount of steps and was found by boyfriend with an almost 

empty bottle of Atarax 50 mg tablets #60.





Upon arrival patient was collared on a backboard protecting her airway.  

Patient was intubated with 7.5 ET tube@receiving 40 mg etomidate 200 mg 

succinylcholine.  The trauma surgeon put a right femoral Cordis central line.  

Please refer to his procedure note.  Patient's blood pressure continued to be 

stable.  According to the records, there is noted to be contusions on her upper 

back was noticed along with the superficial laceration perirectally at 5 o'

clock position.  There was a significant step off at C7-T1.  Images negative.  

Trauma surgery signed off on the trauma since as per him this is a medical case 

given the overdose. 





CT C-spine revealed no acute fracture.  Previous posterior cervical occipital 

cervical thoracic fusion at the level TII.  Also discectomy and fusion 

procedure with anterior instrumentation from C2 through C6.  Chronic appearing 

anterior compression fracture at C5.  Noted kyphotic deformity C5/C6.





Subjective:


11/5: Patient is alert and oriented on low-dose propofol fentanyl infusion.  

Fentanyl now discontinued.  CPAP trials initiated early this a.m., plan for SBT 

trials with plan for extubation this afternoon.  Discussion with trauma surgeon 

Dr. Gonzales, review of CT C-spine, evaluation of patient's motor strength ,C-

spine cleared, c-collar removed.





Objective





Vital Signs








  Date Time  Temp Pulse Resp B/P (MAP) Pulse Ox O2 Delivery O2 Flow Rate FiO2


 


11/5/17 08:29        50


 


11/5/17 08:24     100   


 


11/5/17 07:00      Mechanical Ventilator  


 


11/5/17 06:00  88      














Intake and Output   


 


 11/5/17 11/5/17 11/6/17





 08:00 16:00 00:00


 


Intake Total 1275 ml  


 


Output Total 4160 ml  


 


Balance -2885 ml  








Result Diagram:  


11/5/17 0415                                                                   

             11/5/17 0415





Other Results





Microbiology








 Date/Time


Source Procedure


Growth Status


 


 


 11/5/17 01:05


Stool Stool Stool Occult Blood (ANA) - Final


HEMOCCULT NEGATIVE Complete








Laboratory Tests








Test


  11/4/17


17:45 11/5/17


06:24


 


Blood Gas Puncture Site LT RADIAL  RT RADIAL 


 


Blood Gas Patient Temperature 98.6  98.6 


 


Blood Gas HCO3


  14 mmol/L


(22-26) 17 mmol/L


(22-26)


 


Blood Gas Base Excess


  -10.9 mmol/L


(-2-2) -7.3 mmol/L


(-2-2)


 


Blood Gas Oxygen Saturation 96 % ()  97 % () 


 


Arterial Blood pH


  7.29


(7.380-7.420) 7.35


(7.380-7.420)


 


Arterial Blood Partial


Pressure CO2 31 mmHg


(38-42) 32 mmHg


(38-42)


 


Arterial Blood Partial


Pressure O2 180 mmHg


() 152 mmHg


()


 


Arterial Blood Oxygen Content


  11.1 Vol %


(12.0-20.0) 9.3 Vol %


(12.0-20.0)


 


Arterial Blood


Carboxyhemoglobin 0.9 % (0-4) 


  1.1 % (0-4) 


 


 


Arterial Blood Methemoglobin 1.4 % (0-2)  1.4 % (0-2) 


 


Blood Gas Hemoglobin


  7.9 G/DL


(12.0-16.0) 6.6 G/DL


(12.0-16.0)


 


Oxygen Delivery Device VENT  VENT 


 


Blood Gas Ventilator Setting


  15/500/5PEEP/IT


1 SEE COMMENTS 


 


 


Blood Gas Inspired Oxygen 50 %  50 % 








Imaging





Last Impressions








Chest X-Ray 11/5/17 0600 Signed





Impressions: 





 Service Date/Time:  Sunday, November 5, 2017 04:59 - CONCLUSION:  No 

significant 





 neural change. Persistent patchy left lower lung consolidation versus 





 atelectasis.     Isac Rey MD 


 


Pelvis X-Ray 11/4/17 1439 Signed





Impressions: 





 Service Date/Time:  Saturday, November 4, 2017 14:27 - CONCLUSION: Intact 





 pelvis.     Dash Andres MD 


 


Head CT 11/4/17 1439 Signed





Impressions: 





 Service Date/Time:  Saturday, November 4, 2017 14:54 - CONCLUSION:  Negative 





 noncontrast head CT.     Dash Andres MD 


 


Chest CT 11/4/17 1439 Signed





Impressions: 





 Service Date/Time:  Saturday, November 4, 2017 14:58 - CONCLUSION:  Mild 





 airspace consolidation of both lungs and tiny right, very small left pleural 





 effusions.     Dash Andres MD 


 


Cervical Spine CT 11/4/17 1439 Signed





Impressions: 





 Service Date/Time:  Saturday, November 4, 2017 14:54 - CONCLUSION:  1. No 

acute 





 fracture or acute appearing malalignment of the cervical spine. 2. Previous 





 anterior and posterior fusion as above. No bony bridging at C2/C3 but the 





 anterior fusion appears otherwise solid down to C6.     Dash Andres MD 


 


Abdomen/Pelvis CT 11/4/17 1439 Signed





Impressions: 





 Service Date/Time:  Saturday, November 4, 2017 14:56 - CONCLUSION:  No 

evidence 





 of acute trauma the abdomen or pelvis. Large stool throughout the colon.     





 Dash Andres MD 








Last Impressions








Pelvis X-Ray 11/4/17 1439 Signed





Impressions: 





 Service Date/Time:  Saturday, November 4, 2017 14:27 - CONCLUSION: Intact 





 pelvis.     Dash Andres MD 


 


Head CT 11/4/17 1439 Signed





Impressions: 





 Service Date/Time:  Saturday, November 4, 2017 14:54 - CONCLUSION:  Negative 





 noncontrast head CT.     Dash Andres MD 


 


Chest X-Ray 11/4/17 1439 Signed





Impressions: 





 Service Date/Time:  Saturday, November 4, 2017 14:27 - CONCLUSION:  Bilateral 





 airspace opacities, left worse than right. Endotracheal tube tip close to the 





 adalberto.     Dash Andres MD 


 


Chest CT 11/4/17 1439 Signed





Impressions: 





 Service Date/Time:  Saturday, November 4, 2017 14:58 - CONCLUSION:  Mild 





 airspace consolidation of both lungs and tiny right, very small left pleural 





 effusions.     Dash Andres MD 


 


Cervical Spine CT 11/4/17 1439 Signed





Impressions: 





 Service Date/Time:  Saturday, November 4, 2017 14:54 - CONCLUSION:  1. No 

acute 





 fracture or acute appearing malalignment of the cervical spine. 2. Previous 





 anterior and posterior fusion as above. No bony bridging at C2/C3 but the 





 anterior fusion appears otherwise solid down to C6.     Dash Andres MD 


 


Abdomen/Pelvis CT 11/4/17 1439 Signed





Impressions: 





 Service Date/Time:  Saturday, November 4, 2017 14:56 - CONCLUSION:  No 

evidence 





 of acute trauma the abdomen or pelvis. Large stool throughout the colon.     





 Dash Andres MD 








Objective Remarks


GENERAL: 42 year old  female currently in C-collar, awake alert and 

oriented, following commands


SKIN: Warm and dry.  Well perfused.


HEAD: Atraumatic. Normocephalic. 


EYES: Pupils equal and round about 5 mm bilaterally and reactive. No scleral 

icterus. No injection or drainage. 


ENT: No nasal bleeding or discharge.  Mucous membranes pink and moist.  

Orotracheally intubated


NECK: Trachea midline. No JVD.  C-collar currently in place to be removed


CARDIOVASCULAR: Tachycardic, RR.  S1, S2.  No S4.  Without murmur


RESPIRATORY: Distant but essentially Clear to auscultation. Breath sounds equal 

bilaterally. 


GASTROINTESTINAL: Abdomen soft, non-tender, obese.  Hypoactive bowel sounds are 

appreciated


MUSCULOSKELETAL: Extremities without difficulty and peripheral edema. No 

obvious deformities. 


NEUROLOGICAL: Previously on fentanyl and propofol infusions for ventilator 

synchrony fentanyl has been discontinued.  The patient continues on propofol 

low dose, being currently weaned off





A/P


Assessment and Plan


Neuro/Psych:





Acute altered mental status possible hydroxyzine overdose


Prior cervical discectomy/fusion





CT brain 11/4 revealed no acute intracranial findings


CT C-spine revealed previous posterior cervicooccipital and cervico thoracic 

fusion at the level T2.  Discectomy and fusion procedure with anterior 

instrumentation of C2 through C6.  Chronic appearing anterior compression 

deformity of the C5 vertebral body with kyphotic deformity at C5/C6.


Currently on propofol/fentanyl drips for sedation/analgesia while intubated


Goal of RASS -2


Daily sedation vacation.  Discontinue fentanyl infusion


Urine drug screen currently pending along with EtOH, salicylates and 

acetaminophen


Ammonia level 57


Holding home medications of mirtazapine 15 mg, fluoxetine 40 mg daily, 

gabapentin 600 mg 4 times a day, been serving 1 mg twice a day and hydroxyzine 

50 mg twice a day


F/U gabapentin level


Poison control [id number] pending, continue supportive care recommendations


Currently GCS 11 T





CV:





Sinus tachycardia


Hypertension





Continue normal saline at 84 cc an hour.


Received 1 L crystalloid in ED 11/4


Currently not requiring vasopressors and/or antihypertensives.


Follow-up EKG -concern for QTC prolongation with fluoxetine, and QRS widening 

with hydroxyzine





Resp:





Acute respiratory failure





[facility] 16/550/1/5/100


Ventilator bundle


Albuterol/ipratropium aerosols with albuterol aerosols every 2 hours.  Dyspnea


Spontaneous breathing trials daily-obtain parameters


 





GI: 





Gastroesophageal reflux disease


History of diarrhea





NGT to LIWS


Pantoprazole for GI prophylaxis


Docusate sodium/senna 1 tablet twice a day for bowel regimen


Status post 50 g sorbitol and charcoal for gastric decontamination


Holding loperamide 2 mg as needed





:  





Meek catheter will be placed for accurate I's and O's in a critically ill 

patient





Endo:





Sliding-scale insulin if indicated to maintain euglycemia


 TSH level 1.950 WNL


  


Renal:





Monitor urine output


Accurate I's and O's


 


Heme:





Microcytic anemia





Check iron studies/TIBC and ferritin.


No indications for transfusion of blood proximally at this time.





ID:





Monitor for infection





FEN:





Hyponatremia





Replace electrolytes as clinically indicated per ICU electrolyte protocol





MSK:





PT evaluate and treat





Access


- Utilize peripheral IV.  Remove right femoral Cordis once peripheral IVs 

established





Prophylaxis


- GI - pantoprazole


- DVT - SCD/heparin subcutaneous





Critical Care:


The total critical care time was 30 minutes. Time to perform other separately 

billable procedures was not included in the critical care time.


Physician


Thi Rogers MD Nov 5, 2017 10:46

## 2017-11-05 NOTE — RADRPT
EXAM DATE/TIME:  11/05/2017 04:59 

 

[facility] COMPARISON:     

CHEST SINGLE AP, November 04, 2017, 14:27.

 

                     

INDICATIONS :     

Respiratory failure.

                     

 

MEDICAL HISTORY :     

None.          

 

SURGICAL HISTORY :     

None.   

 

ENCOUNTER:     

Subsequent                                        

 

ACUITY:     

4 - 6 days      

 

PAIN SCORE:     

Non-responsive.

 

LOCATION:     

Bilateral chest 

 

FINDINGS:     

Single AP view of the chest. Endotracheal tube and nasogastric tube remain in place. Lung volumes are
 low. Patchy opacity at the left lung base unchanged. No evidence of pleural effusion or pneumothorax
.

 

CONCLUSION:     

No significant neural change. Persistent patchy left lower lung consolidation versus atelectasis.

 

 

 

 Isac Rey MD on November 05, 2017 at 6:00           

Board Certified Radiologist.

 This report was verified electronically.

## 2017-11-05 NOTE — EKG
Date Performed: 11/04/2017       Time Performed: 19:46:16

 

PTAGE:      89 years

 

EKG:      Sinus rhythm 

 

. Normal ECG

 

PREVIOUS TRACING       : 11/04/2017 16.58 Compared to prior tracing no significant change

 

DOCTOR:   Og Madrigal  Interpretating Date/Time  11/05/2017 12:30:48

## 2017-11-05 NOTE — EKG
Date Performed: 11/04/2017       Time Performed: 16:58:14

 

PTAGE:      89 years

 

EKG:      Sinus rhythm 

 

. Normal ECG 

 

NO PREVIOUS TRACING            

 

DOCTOR:   Og Madrigal  Interpretating Date/Time  11/05/2017 12:30:40

## 2017-11-05 NOTE — EKG
Date Performed: 11/05/2017       Time Performed: 14:18:22

 

PTAGE:      42 years

 

EKG:      SINUS TACHYCARDIA POSSIBLE INFERIOR INFARCT, AGE UNDETERMINED ABNORMAL  ECG

 

PREVIOUS TRACING       : 11/04/2017 19.46 Compared to previous tracing, heart rate has increased.

 

DOCTOR:   Sergei Hazel  Interpretating Date/Time  11/05/2017 15:49:24

## 2017-11-05 NOTE — HHI.CCPN
Subjective


Remarks/Hospital Course


This is a 42-year-old  female.  Actual name is Yrn Yanes.  Date of 

admission 11/4/2017.  Date of consultation 11/4/2017.  She has a history of 

gastroesophageal reflux disease, diarrhea and unknown psychiatric history.  She 

is on mirtazapine, fluoxetine, gabapentin, benztropine, hydroxyzine, loperamide 

and pantoprazole.  She presented to [place] ED as a trauma alert 1 status post 

fall down unknown amount of steps and was found by boyfriend with an almost 

empty bottle of Atarax 50 mg tablets #60.





Upon arrival patient was collared on a backboard protecting her airway.  

Patient was intubated with 7.5 ET tube@receiving 40 mg etomidate 200 mg 

succinylcholine.  The trauma surgeon put a right femoral Cordis central line.  

Please refer to his procedure note.  Patient's blood pressure continued to be 

stable.  According to the records, there is noted to be contusions on her upper 

back was noticed along with the superficial laceration perirectally at 5 o'

clock position.  There was a significant step off at C7-T1.  Images negative.  

Trauma surgery signed off on the trauma since as per him this is a medical case 

given the overdose. 





CT C-spine revealed no acute fracture.  Previous posterior cervical occipital 

cervical thoracic fusion at the level TII.  Also discectomy and fusion 

procedure with anterior instrumentation from C2 through C6.  Chronic appearing 

anterior compression fracture at C5.  Noted kyphotic deformity C5/C6.





Subjective:


11/5: Patient is alert and oriented on low-dose propofol fentanyl infusion.  

Fentanyl now discontinued.  CPAP trials initiated early this a.m., plan for SBT 

trials with plan for extubation this afternoon.  Discussion with trauma surgeon 

Dr. Gonzales, review of CT C-spine, evaluation of patient's motor strength ,C-

spine cleared, c-collar removed.





Objective





Vital Signs








  Date Time  Temp Pulse Resp B/P (MAP) Pulse Ox O2 Delivery O2 Flow Rate FiO2


 


11/5/17 08:29        50


 


11/5/17 08:24     100   


 


11/5/17 07:00      Mechanical Ventilator  


 


11/5/17 06:00  88      














Intake and Output   


 


 11/5/17 11/5/17 11/6/17





 08:00 16:00 00:00


 


Intake Total 1275 ml  


 


Output Total 4160 ml  


 


Balance -2885 ml  








Result Diagram:  


11/5/17 0415                                                                   

             11/5/17 0415





Other Results





Microbiology








 Date/Time


Source Procedure


Growth Status


 


 


 11/5/17 01:05


Stool Stool Stool Occult Blood (ANA) - Final


HEMOCCULT NEGATIVE Complete








Laboratory Tests








Test


  11/4/17


17:45 11/5/17


06:24


 


Blood Gas Puncture Site LT RADIAL  RT RADIAL 


 


Blood Gas Patient Temperature 98.6  98.6 


 


Blood Gas HCO3


  14 mmol/L


(22-26) 17 mmol/L


(22-26)


 


Blood Gas Base Excess


  -10.9 mmol/L


(-2-2) -7.3 mmol/L


(-2-2)


 


Blood Gas Oxygen Saturation 96 % ()  97 % () 


 


Arterial Blood pH


  7.29


(7.380-7.420) 7.35


(7.380-7.420)


 


Arterial Blood Partial


Pressure CO2 31 mmHg


(38-42) 32 mmHg


(38-42)


 


Arterial Blood Partial


Pressure O2 180 mmHg


() 152 mmHg


()


 


Arterial Blood Oxygen Content


  11.1 Vol %


(12.0-20.0) 9.3 Vol %


(12.0-20.0)


 


Arterial Blood


Carboxyhemoglobin 0.9 % (0-4) 


  1.1 % (0-4) 


 


 


Arterial Blood Methemoglobin 1.4 % (0-2)  1.4 % (0-2) 


 


Blood Gas Hemoglobin


  7.9 G/DL


(12.0-16.0) 6.6 G/DL


(12.0-16.0)


 


Oxygen Delivery Device VENT  VENT 


 


Blood Gas Ventilator Setting


  15/500/5PEEP/IT


1 SEE COMMENTS 


 


 


Blood Gas Inspired Oxygen 50 %  50 % 








Imaging





Last Impressions








Chest X-Ray 11/5/17 0600 Signed





Impressions: 





 Service Date/Time:  Sunday, November 5, 2017 04:59 - CONCLUSION:  No 

significant 





 neural change. Persistent patchy left lower lung consolidation versus 





 atelectasis.     Isac Rey MD 


 


Pelvis X-Ray 11/4/17 1439 Signed





Impressions: 





 Service Date/Time:  Saturday, November 4, 2017 14:27 - CONCLUSION: Intact 





 pelvis.     Dash Andres MD 


 


Head CT 11/4/17 1439 Signed





Impressions: 





 Service Date/Time:  Saturday, November 4, 2017 14:54 - CONCLUSION:  Negative 





 noncontrast head CT.     Dash Andres MD 


 


Chest CT 11/4/17 1439 Signed





Impressions: 





 Service Date/Time:  Saturday, November 4, 2017 14:58 - CONCLUSION:  Mild 





 airspace consolidation of both lungs and tiny right, very small left pleural 





 effusions.     Dash Andres MD 


 


Cervical Spine CT 11/4/17 1439 Signed





Impressions: 





 Service Date/Time:  Saturday, November 4, 2017 14:54 - CONCLUSION:  1. No 

acute 





 fracture or acute appearing malalignment of the cervical spine. 2. Previous 





 anterior and posterior fusion as above. No bony bridging at C2/C3 but the 





 anterior fusion appears otherwise solid down to C6.     Dash Andres MD 


 


Abdomen/Pelvis CT 11/4/17 1439 Signed





Impressions: 





 Service Date/Time:  Saturday, November 4, 2017 14:56 - CONCLUSION:  No 

evidence 





 of acute trauma the abdomen or pelvis. Large stool throughout the colon.     





 Dash Andres MD 








Last Impressions








Pelvis X-Ray 11/4/17 1439 Signed





Impressions: 





 Service Date/Time:  Saturday, November 4, 2017 14:27 - CONCLUSION: Intact 





 pelvis.     Dash Andres MD 


 


Head CT 11/4/17 1439 Signed





Impressions: 





 Service Date/Time:  Saturday, November 4, 2017 14:54 - CONCLUSION:  Negative 





 noncontrast head CT.     Dash Andres MD 


 


Chest X-Ray 11/4/17 1439 Signed





Impressions: 





 Service Date/Time:  Saturday, November 4, 2017 14:27 - CONCLUSION:  Bilateral 





 airspace opacities, left worse than right. Endotracheal tube tip close to the 





 adalberto.     Dash Andres MD 


 


Chest CT 11/4/17 1439 Signed





Impressions: 





 Service Date/Time:  Saturday, November 4, 2017 14:58 - CONCLUSION:  Mild 





 airspace consolidation of both lungs and tiny right, very small left pleural 





 effusions.     Dash Andres MD 


 


Cervical Spine CT 11/4/17 1439 Signed





Impressions: 





 Service Date/Time:  Saturday, November 4, 2017 14:54 - CONCLUSION:  1. No 

acute 





 fracture or acute appearing malalignment of the cervical spine. 2. Previous 





 anterior and posterior fusion as above. No bony bridging at C2/C3 but the 





 anterior fusion appears otherwise solid down to C6.     Dash Andres MD 


 


Abdomen/Pelvis CT 11/4/17 1439 Signed





Impressions: 





 Service Date/Time:  Saturday, November 4, 2017 14:56 - CONCLUSION:  No 

evidence 





 of acute trauma the abdomen or pelvis. Large stool throughout the colon.     





 Dash Andres MD 








Objective Remarks


GENERAL: 42 year old  female currently in C-collar, awake alert and 

oriented, following commands


SKIN: Warm and dry.  Well perfused.


HEAD: Atraumatic. Normocephalic. 


EYES: Pupils equal and round about 5 mm bilaterally and reactive. No scleral 

icterus. No injection or drainage. 


ENT: No nasal bleeding or discharge.  Mucous membranes pink and moist.  

Orotracheally intubated


NECK: Trachea midline. No JVD.  C-collar currently in place to be removed


CARDIOVASCULAR: Tachycardic, RR.  S1, S2.  No S4.  Without murmur


RESPIRATORY: Distant but essentially Clear to auscultation. Breath sounds equal 

bilaterally. 


GASTROINTESTINAL: Abdomen soft, non-tender, obese.  Hypoactive bowel sounds are 

appreciated


MUSCULOSKELETAL: Extremities without difficulty and peripheral edema. No 

obvious deformities. 


NEUROLOGICAL: Previously on fentanyl and propofol infusions for ventilator 

synchrony fentanyl has been discontinued.  The patient continues on propofol 

low dose, being currently weaned off





A/P


Assessment and Plan


Neuro/Psych:





Acute altered mental status possible hydroxyzine overdose


Prior cervical discectomy/fusion





CT brain 11/4 revealed no acute intracranial findings


CT C-spine revealed previous posterior cervicooccipital and cervico thoracic 

fusion at the level T2.  Discectomy and fusion procedure with anterior 

instrumentation of C2 through C6.  Chronic appearing anterior compression 

deformity of the C5 vertebral body with kyphotic deformity at C5/C6.


Currently on propofol/fentanyl drips for sedation/analgesia while intubated


Goal of RASS -2


Daily sedation vacation.  Discontinue fentanyl infusion


Urine drug screen currently pending along with EtOH, salicylates and 

acetaminophen


Ammonia level 57


Holding home medications of mirtazapine 15 mg, fluoxetine 40 mg daily, 

gabapentin 600 mg 4 times a day, been serving 1 mg twice a day and hydroxyzine 

50 mg twice a day


F/U gabapentin level


Poison control [id number] pending, continue supportive care recommendations


Currently GCS 11 T





CV:





Sinus tachycardia


Hypertension





Continue normal saline at 84 cc an hour.


Received 1 L crystalloid in ED 11/4


Currently not requiring vasopressors and/or antihypertensives.


Follow-up EKG -concern for QTC prolongation with fluoxetine, and QRS widening 

with hydroxyzine





Resp:





Acute respiratory failure





[facility] 16/550/1/5/100


Ventilator bundle


Albuterol/ipratropium aerosols with albuterol aerosols every 2 hours.  Dyspnea


Spontaneous breathing trials daily-obtain parameters


 





GI: 





Gastroesophageal reflux disease


History of diarrhea





NGT to LIWS


Pantoprazole for GI prophylaxis


Docusate sodium/senna 1 tablet twice a day for bowel regimen


Status post 50 g sorbitol and charcoal for gastric decontamination


Holding loperamide 2 mg as needed





:  





Meek catheter will be placed for accurate I's and O's in a critically ill 

patient





Endo:





Sliding-scale insulin if indicated to maintain euglycemia


 TSH level 1.950 WNL


  


Renal:





Monitor urine output


Accurate I's and O's


 


Heme:





Microcytic anemia





Check iron studies/TIBC and ferritin.


No indications for transfusion of blood proximally at this time.





ID:





Monitor for infection





FEN:





Hyponatremia





Replace electrolytes as clinically indicated per ICU electrolyte protocol





MSK:





PT evaluate and treat





Access


- Utilize peripheral IV.  Remove right femoral Cordis once peripheral IVs 

established





Prophylaxis


- GI - pantoprazole


- DVT - SCD/heparin subcutaneous





Critical Care:


The total critical care time was 30 minutes. Time to perform other separately 

billable procedures was not included in the critical care time.


Physician


Thi Rogers MD Nov 5, 2017 10:46

## 2017-11-05 NOTE — HHI.CCPN
Subjective


Remarks/Hospital Course


This is a 42-year-old  female.  Actual name is Yrn Yanes.  Date of 

admission 11/4/2017.  Date of consultation 11/4/2017.  She has a history of 

gastroesophageal reflux disease, diarrhea and unknown psychiatric history.  She 

is on mirtazapine, fluoxetine, gabapentin, benztropine, hydroxyzine, loperamide 

and pantoprazole.  She presented to [place] ED as a trauma alert 1 status post 

fall down unknown amount of steps and was found by boyfriend with an almost 

empty bottle of Atarax 50 mg tablets #60.





Upon arrival patient was collared on a backboard protecting her airway.  

Patient was intubated with 7.5 ET tube@receiving 40 mg etomidate 200 mg 

succinylcholine.  The trauma surgeon put a right femoral Cordis central line.  

Please refer to his procedure note.  Patient's blood pressure continued to be 

stable.  According to the records, there is noted to be contusions on her upper 

back was noticed along with the superficial laceration perirectally at 5 o'

clock position.  There was a significant step off at C7-T1.  Images negative.  

Trauma surgery signed off on the trauma since as per him this is a medical case 

given the overdose. 





CT C-spine revealed no acute fracture.  Previous posterior cervical occipital 

cervical thoracic fusion at the level TII.  Also discectomy and fusion 

procedure with anterior instrumentation from C2 through C6.  Chronic appearing 

anterior compression fracture at C5.  Noted kyphotic deformity C5/C6.





Subjective:


11/5: Patient is alert and oriented on low-dose propofol fentanyl infusion.  

Fentanyl now discontinued.  CPAP trials initiated early this a.m., plan for SBT 

trials with plan for extubation this afternoon.  Discussion with trauma surgeon 

Dr. Gonzales, review of CT C-spine, evaluation of patient's motor strength ,C-

spine cleared, c-collar removed.





Objective





Vital Signs








  Date Time  Temp Pulse Resp B/P (MAP) Pulse Ox O2 Delivery O2 Flow Rate FiO2


 


11/5/17 08:29        50


 


11/5/17 08:24     100   


 


11/5/17 07:00      Mechanical Ventilator  


 


11/5/17 06:00  88      














Intake and Output   


 


 11/5/17 11/5/17 11/6/17





 08:00 16:00 00:00


 


Intake Total 1275 ml  


 


Output Total 4160 ml  


 


Balance -2885 ml  








Result Diagram:  


11/5/17 0415                                                                   

             11/5/17 0415





Other Results





Microbiology








 Date/Time


Source Procedure


Growth Status


 


 


 11/5/17 01:05


Stool Stool Stool Occult Blood (ANA) - Final


HEMOCCULT NEGATIVE Complete








Laboratory Tests








Test


  11/4/17


17:45 11/5/17


06:24


 


Blood Gas Puncture Site LT RADIAL  RT RADIAL 


 


Blood Gas Patient Temperature 98.6  98.6 


 


Blood Gas HCO3


  14 mmol/L


(22-26) 17 mmol/L


(22-26)


 


Blood Gas Base Excess


  -10.9 mmol/L


(-2-2) -7.3 mmol/L


(-2-2)


 


Blood Gas Oxygen Saturation 96 % ()  97 % () 


 


Arterial Blood pH


  7.29


(7.380-7.420) 7.35


(7.380-7.420)


 


Arterial Blood Partial


Pressure CO2 31 mmHg


(38-42) 32 mmHg


(38-42)


 


Arterial Blood Partial


Pressure O2 180 mmHg


() 152 mmHg


()


 


Arterial Blood Oxygen Content


  11.1 Vol %


(12.0-20.0) 9.3 Vol %


(12.0-20.0)


 


Arterial Blood


Carboxyhemoglobin 0.9 % (0-4) 


  1.1 % (0-4) 


 


 


Arterial Blood Methemoglobin 1.4 % (0-2)  1.4 % (0-2) 


 


Blood Gas Hemoglobin


  7.9 G/DL


(12.0-16.0) 6.6 G/DL


(12.0-16.0)


 


Oxygen Delivery Device VENT  VENT 


 


Blood Gas Ventilator Setting


  15/500/5PEEP/IT


1 SEE COMMENTS 


 


 


Blood Gas Inspired Oxygen 50 %  50 % 








Imaging





Last Impressions








Chest X-Ray 11/5/17 0600 Signed





Impressions: 





 Service Date/Time:  Sunday, November 5, 2017 04:59 - CONCLUSION:  No 

significant 





 neural change. Persistent patchy left lower lung consolidation versus 





 atelectasis.     Isac Rey MD 


 


Pelvis X-Ray 11/4/17 1439 Signed





Impressions: 





 Service Date/Time:  Saturday, November 4, 2017 14:27 - CONCLUSION: Intact 





 pelvis.     Dash Andres MD 


 


Head CT 11/4/17 1439 Signed





Impressions: 





 Service Date/Time:  Saturday, November 4, 2017 14:54 - CONCLUSION:  Negative 





 noncontrast head CT.     Dash Andres MD 


 


Chest CT 11/4/17 1439 Signed





Impressions: 





 Service Date/Time:  Saturday, November 4, 2017 14:58 - CONCLUSION:  Mild 





 airspace consolidation of both lungs and tiny right, very small left pleural 





 effusions.     Dash Andres MD 


 


Cervical Spine CT 11/4/17 1439 Signed





Impressions: 





 Service Date/Time:  Saturday, November 4, 2017 14:54 - CONCLUSION:  1. No 

acute 





 fracture or acute appearing malalignment of the cervical spine. 2. Previous 





 anterior and posterior fusion as above. No bony bridging at C2/C3 but the 





 anterior fusion appears otherwise solid down to C6.     Dash Andres MD 


 


Abdomen/Pelvis CT 11/4/17 1439 Signed





Impressions: 





 Service Date/Time:  Saturday, November 4, 2017 14:56 - CONCLUSION:  No 

evidence 





 of acute trauma the abdomen or pelvis. Large stool throughout the colon.     





 Dash Andres MD 








Last Impressions








Pelvis X-Ray 11/4/17 1439 Signed





Impressions: 





 Service Date/Time:  Saturday, November 4, 2017 14:27 - CONCLUSION: Intact 





 pelvis.     Dash Andres MD 


 


Head CT 11/4/17 1439 Signed





Impressions: 





 Service Date/Time:  Saturday, November 4, 2017 14:54 - CONCLUSION:  Negative 





 noncontrast head CT.     Dash Andres MD 


 


Chest X-Ray 11/4/17 1439 Signed





Impressions: 





 Service Date/Time:  Saturday, November 4, 2017 14:27 - CONCLUSION:  Bilateral 





 airspace opacities, left worse than right. Endotracheal tube tip close to the 





 adalberto.     Dash Andres MD 


 


Chest CT 11/4/17 1439 Signed





Impressions: 





 Service Date/Time:  Saturday, November 4, 2017 14:58 - CONCLUSION:  Mild 





 airspace consolidation of both lungs and tiny right, very small left pleural 





 effusions.     Dash Andres MD 


 


Cervical Spine CT 11/4/17 1439 Signed





Impressions: 





 Service Date/Time:  Saturday, November 4, 2017 14:54 - CONCLUSION:  1. No 

acute 





 fracture or acute appearing malalignment of the cervical spine. 2. Previous 





 anterior and posterior fusion as above. No bony bridging at C2/C3 but the 





 anterior fusion appears otherwise solid down to C6.     Dash Andres MD 


 


Abdomen/Pelvis CT 11/4/17 1439 Signed





Impressions: 





 Service Date/Time:  Saturday, November 4, 2017 14:56 - CONCLUSION:  No 

evidence 





 of acute trauma the abdomen or pelvis. Large stool throughout the colon.     





 Dash Andres MD 








Objective Remarks


GENERAL: 42 year old  female currently in C-collar, awake alert and 

oriented, following commands


SKIN: Warm and dry.  Well perfused.


HEAD: Atraumatic. Normocephalic. 


EYES: Pupils equal and round about 5 mm bilaterally and reactive. No scleral 

icterus. No injection or drainage. 


ENT: No nasal bleeding or discharge.  Mucous membranes pink and moist.  

Orotracheally intubated


NECK: Trachea midline. No JVD.  C-collar currently in place to be removed


CARDIOVASCULAR: Tachycardic, RR.  S1, S2.  No S4.  Without murmur


RESPIRATORY: Distant but essentially Clear to auscultation. Breath sounds equal 

bilaterally. 


GASTROINTESTINAL: Abdomen soft, non-tender, obese.  Hypoactive bowel sounds are 

appreciated


MUSCULOSKELETAL: Extremities without difficulty and peripheral edema. No 

obvious deformities. 


NEUROLOGICAL: Previously on fentanyl and propofol infusions for ventilator 

synchrony fentanyl has been discontinued.  The patient continues on propofol 

low dose, being currently weaned off





A/P


Assessment and Plan


Neuro/Psych:





Acute altered mental status possible hydroxyzine overdose


Prior cervical discectomy/fusion





CT brain 11/4 revealed no acute intracranial findings


CT C-spine revealed previous posterior cervicooccipital and cervico thoracic 

fusion at the level T2.  Discectomy and fusion procedure with anterior 

instrumentation of C2 through C6.  Chronic appearing anterior compression 

deformity of the C5 vertebral body with kyphotic deformity at C5/C6.


Currently on propofol/fentanyl drips for sedation/analgesia while intubated


Goal of RASS -2


Daily sedation vacation.  Discontinue fentanyl infusion


Urine drug screen currently pending along with EtOH, salicylates and 

acetaminophen


Ammonia level 57


Holding home medications of mirtazapine 15 mg, fluoxetine 40 mg daily, 

gabapentin 600 mg 4 times a day, been serving 1 mg twice a day and hydroxyzine 

50 mg twice a day


F/U gabapentin level


Poison control [id number] pending, continue supportive care recommendations


Currently GCS 11 T





CV:





Sinus tachycardia


Hypertension





Continue normal saline at 84 cc an hour.


Received 1 L crystalloid in ED 11/4


Currently not requiring vasopressors and/or antihypertensives.


Follow-up EKG -concern for QTC prolongation with fluoxetine, and QRS widening 

with hydroxyzine





Resp:





Acute respiratory failure





[facility] 16/550/1/5/100


Ventilator bundle


Albuterol/ipratropium aerosols with albuterol aerosols every 2 hours.  Dyspnea


Spontaneous breathing trials daily-obtain parameters


 





GI: 





Gastroesophageal reflux disease


History of diarrhea





NGT to LIWS


Pantoprazole for GI prophylaxis


Docusate sodium/senna 1 tablet twice a day for bowel regimen


Status post 50 g sorbitol and charcoal for gastric decontamination


Holding loperamide 2 mg as needed





:  





Meek catheter will be placed for accurate I's and O's in a critically ill 

patient





Endo:





Sliding-scale insulin if indicated to maintain euglycemia


 TSH level 1.950 WNL


  


Renal:





Monitor urine output


Accurate I's and O's


 


Heme:





Microcytic anemia





Check iron studies/TIBC and ferritin.


No indications for transfusion of blood proximally at this time.





ID:





Monitor for infection





FEN:





Hyponatremia





Replace electrolytes as clinically indicated per ICU electrolyte protocol





MSK:





PT evaluate and treat





Access


- Utilize peripheral IV.  Remove right femoral Cordis once peripheral IVs 

established





Prophylaxis


- GI - pantoprazole


- DVT - SCD/heparin subcutaneous





Critical Care:


The total critical care time was 30 minutes. Time to perform other separately 

billable procedures was not included in the critical care time.


Physician


Thi Rogers MD Nov 5, 2017 10:46

## 2017-11-06 ENCOUNTER — HOSPITAL ENCOUNTER (INPATIENT)
Dept: HOSPITAL 17 - H4EA | Age: 42
LOS: 4 days | Discharge: HOME | DRG: 881 | End: 2017-11-10
Attending: PSYCHIATRY & NEUROLOGY | Admitting: PSYCHIATRY & NEUROLOGY
Payer: SELF-PAY

## 2017-11-06 VITALS
SYSTOLIC BLOOD PRESSURE: 182 MMHG | HEART RATE: 77 BPM | RESPIRATION RATE: 19 BRPM | TEMPERATURE: 98.6 F | OXYGEN SATURATION: 100 % | DIASTOLIC BLOOD PRESSURE: 103 MMHG

## 2017-11-06 VITALS
HEART RATE: 86 BPM | TEMPERATURE: 98.1 F | SYSTOLIC BLOOD PRESSURE: 190 MMHG | RESPIRATION RATE: 22 BRPM | OXYGEN SATURATION: 100 % | DIASTOLIC BLOOD PRESSURE: 102 MMHG

## 2017-11-06 VITALS
SYSTOLIC BLOOD PRESSURE: 176 MMHG | TEMPERATURE: 98 F | RESPIRATION RATE: 24 BRPM | DIASTOLIC BLOOD PRESSURE: 100 MMHG | OXYGEN SATURATION: 93 % | HEART RATE: 82 BPM

## 2017-11-06 VITALS
OXYGEN SATURATION: 100 % | RESPIRATION RATE: 19 BRPM | SYSTOLIC BLOOD PRESSURE: 177 MMHG | HEART RATE: 94 BPM | DIASTOLIC BLOOD PRESSURE: 87 MMHG | TEMPERATURE: 98.7 F

## 2017-11-06 VITALS
RESPIRATION RATE: 20 BRPM | DIASTOLIC BLOOD PRESSURE: 98 MMHG | SYSTOLIC BLOOD PRESSURE: 179 MMHG | OXYGEN SATURATION: 100 % | HEART RATE: 79 BPM | TEMPERATURE: 98.6 F

## 2017-11-06 VITALS
RESPIRATION RATE: 20 BRPM | OXYGEN SATURATION: 100 % | TEMPERATURE: 98.5 F | SYSTOLIC BLOOD PRESSURE: 175 MMHG | DIASTOLIC BLOOD PRESSURE: 97 MMHG | HEART RATE: 81 BPM

## 2017-11-06 VITALS — HEART RATE: 91 BPM | DIASTOLIC BLOOD PRESSURE: 95 MMHG | SYSTOLIC BLOOD PRESSURE: 165 MMHG

## 2017-11-06 VITALS — SYSTOLIC BLOOD PRESSURE: 182 MMHG | DIASTOLIC BLOOD PRESSURE: 103 MMHG

## 2017-11-06 DIAGNOSIS — M79.7: ICD-10-CM

## 2017-11-06 DIAGNOSIS — I10: ICD-10-CM

## 2017-11-06 DIAGNOSIS — Z81.1: ICD-10-CM

## 2017-11-06 DIAGNOSIS — I47.1: ICD-10-CM

## 2017-11-06 DIAGNOSIS — E83.42: ICD-10-CM

## 2017-11-06 DIAGNOSIS — Z91.410: ICD-10-CM

## 2017-11-06 DIAGNOSIS — F41.8: ICD-10-CM

## 2017-11-06 DIAGNOSIS — E87.6: ICD-10-CM

## 2017-11-06 DIAGNOSIS — Z79.899: ICD-10-CM

## 2017-11-06 DIAGNOSIS — Z91.19: ICD-10-CM

## 2017-11-06 DIAGNOSIS — G40.409: ICD-10-CM

## 2017-11-06 DIAGNOSIS — F43.21: Primary | ICD-10-CM

## 2017-11-06 DIAGNOSIS — F43.10: ICD-10-CM

## 2017-11-06 DIAGNOSIS — G43.909: ICD-10-CM

## 2017-11-06 DIAGNOSIS — F17.200: ICD-10-CM

## 2017-11-06 DIAGNOSIS — F60.3: ICD-10-CM

## 2017-11-06 DIAGNOSIS — Z87.11: ICD-10-CM

## 2017-11-06 DIAGNOSIS — R19.7: ICD-10-CM

## 2017-11-06 DIAGNOSIS — R01.1: ICD-10-CM

## 2017-11-06 DIAGNOSIS — I48.91: ICD-10-CM

## 2017-11-06 DIAGNOSIS — Z91.5: ICD-10-CM

## 2017-11-06 DIAGNOSIS — K21.9: ICD-10-CM

## 2017-11-06 PROCEDURE — 70553 MRI BRAIN STEM W/O & W/DYE: CPT

## 2017-11-06 PROCEDURE — 80184 ASSAY OF PHENOBARBITAL: CPT

## 2017-11-06 PROCEDURE — 83935 ASSAY OF URINE OSMOLALITY: CPT

## 2017-11-06 PROCEDURE — 83735 ASSAY OF MAGNESIUM: CPT

## 2017-11-06 PROCEDURE — 83930 ASSAY OF BLOOD OSMOLALITY: CPT

## 2017-11-06 PROCEDURE — 83690 ASSAY OF LIPASE: CPT

## 2017-11-06 PROCEDURE — A9579 GAD-BASE MR CONTRAST NOS,1ML: HCPCS

## 2017-11-06 PROCEDURE — 86592 SYPHILIS TEST NON-TREP QUAL: CPT

## 2017-11-06 PROCEDURE — 85652 RBC SED RATE AUTOMATED: CPT

## 2017-11-06 PROCEDURE — 74000: CPT

## 2017-11-06 PROCEDURE — 82570 ASSAY OF URINE CREATININE: CPT

## 2017-11-06 PROCEDURE — 95819 EEG AWAKE AND ASLEEP: CPT

## 2017-11-06 PROCEDURE — 84425 ASSAY OF VITAMIN B-1: CPT

## 2017-11-06 PROCEDURE — 82607 VITAMIN B-12: CPT

## 2017-11-06 PROCEDURE — 76700 US EXAM ABDOM COMPLETE: CPT

## 2017-11-06 PROCEDURE — 80048 BASIC METABOLIC PNL TOTAL CA: CPT

## 2017-11-06 PROCEDURE — 80053 COMPREHEN METABOLIC PANEL: CPT

## 2017-11-06 PROCEDURE — 80171 DRUG SCREEN QUANT GABAPENTIN: CPT

## 2017-11-06 PROCEDURE — 84300 ASSAY OF URINE SODIUM: CPT

## 2017-11-06 PROCEDURE — 84702 CHORIONIC GONADOTROPIN TEST: CPT

## 2017-11-06 PROCEDURE — 76937 US GUIDE VASCULAR ACCESS: CPT

## 2017-11-06 RX ADMIN — ATENOLOL SCH MG: 50 TABLET ORAL at 05:30

## 2017-11-06 RX ADMIN — HUMAN INSULIN SCH: 100 INJECTION, SOLUTION SUBCUTANEOUS at 00:00

## 2017-11-06 RX ADMIN — Medication SCH ML: at 09:30

## 2017-11-06 RX ADMIN — ACETAMINOPHEN PRN MG: 325 TABLET ORAL at 21:45

## 2017-11-06 RX ADMIN — HUMAN INSULIN SCH: 100 INJECTION, SOLUTION SUBCUTANEOUS at 12:00

## 2017-11-06 RX ADMIN — ATENOLOL SCH MG: 50 TABLET ORAL at 17:39

## 2017-11-06 RX ADMIN — IPRATROPIUM BROMIDE AND ALBUTEROL SULFATE SCH AMPULE: .5; 3 SOLUTION RESPIRATORY (INHALATION) at 09:32

## 2017-11-06 RX ADMIN — PROMETHAZINE HYDROCHLORIDE PRN MG: 25 TABLET ORAL at 21:41

## 2017-11-06 RX ADMIN — BUTALBITAL, ACETAMINOPHEN, AND CAFFEINE PRN TAB: 50; 325; 40 TABLET ORAL at 02:55

## 2017-11-06 RX ADMIN — ACETAMINOPHEN PRN MG: 325 TABLET ORAL at 17:40

## 2017-11-06 RX ADMIN — FERROUS SULFATE TAB 325 MG (65 MG ELEMENTAL FE) SCH MG: 325 (65 FE) TAB at 17:00

## 2017-11-06 RX ADMIN — PHENYTOIN SODIUM SCH MLS/HR: 50 INJECTION INTRAMUSCULAR; INTRAVENOUS at 02:11

## 2017-11-06 RX ADMIN — CHLORHEXIDINE GLUCONATE SCH PACK: 500 CLOTH TOPICAL at 06:30

## 2017-11-06 RX ADMIN — HUMAN INSULIN SCH: 100 INJECTION, SOLUTION SUBCUTANEOUS at 05:32

## 2017-11-06 RX ADMIN — MIRTAZAPINE SCH MG: 15 TABLET, FILM COATED ORAL at 21:11

## 2017-11-06 RX ADMIN — PROMETHAZINE HYDROCHLORIDE PRN MG: 25 INJECTION INTRAMUSCULAR; INTRAVENOUS at 08:26

## 2017-11-06 RX ADMIN — PROMETHAZINE HYDROCHLORIDE PRN MG: 25 INJECTION INTRAMUSCULAR; INTRAVENOUS at 02:03

## 2017-11-06 RX ADMIN — HEPARIN SODIUM SCH UNITS: 10000 INJECTION, SOLUTION INTRAVENOUS; SUBCUTANEOUS at 08:25

## 2017-11-06 RX ADMIN — STANDARDIZED SENNA CONCENTRATE AND DOCUSATE SODIUM SCH TAB: 8.6; 5 TABLET, FILM COATED ORAL at 09:20

## 2017-11-06 RX ADMIN — PANTOPRAZOLE SCH MG: 40 TABLET, DELAYED RELEASE ORAL at 21:11

## 2017-11-06 RX ADMIN — POLYVINYL ALCOHOL SCH DROP: 14 SOLUTION/ DROPS OPHTHALMIC at 09:00

## 2017-11-06 RX ADMIN — BUTALBITAL, ACETAMINOPHEN, AND CAFFEINE PRN TAB: 50; 325; 40 TABLET ORAL at 09:27

## 2017-11-06 RX ADMIN — CHLORHEXIDINE GLUCONATE 0.12% ORAL RINSE SCH ML: 1.2 LIQUID ORAL at 08:00

## 2017-11-06 RX ADMIN — IPRATROPIUM BROMIDE AND ALBUTEROL SULFATE SCH AMPULE: .5; 3 SOLUTION RESPIRATORY (INHALATION) at 04:00

## 2017-11-06 RX ADMIN — STANDARDIZED SENNA CONCENTRATE AND DOCUSATE SODIUM SCH TAB: 8.6; 5 TABLET, FILM COATED ORAL at 09:00

## 2017-11-06 RX ADMIN — PANTOPRAZOLE SODIUM SCH MG: 40 INJECTION, POWDER, FOR SOLUTION INTRAVENOUS at 08:25

## 2017-11-06 NOTE — HHI.DS
__________________________________________________





Discharge Summary


Admission Date


Nov 4, 2017 at 15:19


Discharge Date:  Nov 6, 2017


Admitting Diagnosis





unresponsive, respiratory failure, overdose





(1) Overdose


ICD Code:  T50.901A - Poisoning by unspecified drugs, medicaments and 

biological substances, accidental (unintentional), initial encounter


Status:  Acute


(2) Respiratory failure


ICD Code:  J96.90 - Respiratory failure, unspecified, unspecified whether with 

hypoxia or hypercapnia


Status:  Acute


(3) Unresponsive


ICD Code:  R41.89 - Other symptoms and signs involving cognitive functions and 

awareness


Status:  Acute


(4) Adjustment disorder with depressed mood


ICD Code:  F43.21 - Adjustment disorder with depressed mood


Procedures


intubation/extubation


Brief History - From Admission


42 year-old female according to the paramedic report likely overdose atarax-was 

found down at the bottom of the staircase.  Her GCS was 3 on arrival she did 

not respond to Marcaine she was difficult access so that so that I was inserted 

to a tibia she remained hemodynamically normal with the low GCS.  Open arrival 

she was orotracheally intubated.  Fast negative exam by external trauma right 

femoral vein IV Cordis access was obtained and patient brought was brought to 

CAT scan for her workup.


CBC/BMP:  


11/5/17 0415                                                                   

             11/5/17 0415





Significant Findings





Laboratory Tests








Test


  11/4/17


14:38 11/4/17


16:22 11/4/17


17:45 11/4/17


18:20


 


Red Blood Count


  3.44 MIL/MM3


(4.00-5.30) 


  


  


 


 


Hemoglobin


  8.4 GM/DL


(11.6-15.3) 


  


  


 


 


Bedside Hemoglobin


  9.5 G/DL


(12.0-17.0) 


  


  


 


 


Hematocrit


  26.7 %


(35.0-46.0) 


  


  


 


 


Bedside Hematocrit


  28.0 %


(38.0-51.0) 


  


  


 


 


Mean Corpuscular Volume


  77.4 FL


(80.0-100.0) 


  


  


 


 


Mean Corpuscular Hemoglobin


  24.5 PG


(27.0-34.0) 


  


  


 


 


Mean Corpuscular Hemoglobin


Concent 31.6 %


(32.0-36.0) 


  


  


 


 


Red Cell Distribution Width


  18.6 %


(11.6-17.2) 


  


  


 


 


Mean Platelet Volume


  6.5 FL


(7.0-11.0) 


  


  


 


 


Neutrophils (%) (Auto)


  82.6 %


(16.0-70.0) 


  


  


 


 


Eosinophils (%) (Auto)


  5.1 %


(0.0-4.0) 


  


  


 


 


Lymphocytes # (Auto)


  0.9 TH/MM3


(1.0-4.8) 


  


  


 


 


Eosinophils # (Auto)


  0.5 TH/MM3


(0-0.4) 


  


  


 


 


Activated Partial


Thromboplast Time 20.1 SEC


(24.3-30.1) 


  


  


 


 


Bedside Sodium


  130 MMOL/L


(138-146) 


  


  


 


 


Bedside Glucose


  116 MG/DL


(60-95) 


  


  


 


 


Iron Level


  26 MCG/DL


() 


  


  


 


 


Percent Iron Saturation 5.8 % (20-50)    


 


Total Bilirubin


  0.1 MG/DL


(0.2-1.0) 


  


  


 


 


Albumin


  3.3 GM/DL


(3.4-5.0) 


  


  


 


 


Acetaminophen Level


  LESS THAN 2.0


MCG/ML 


  


  


 


 


Urine Barbiturates Screen  POS (NEG)   


 


Blood Gas HCO3


  


  


  14 mmol/L


(22-26) 


 


 


Blood Gas Base Excess


  


  


  -10.9 mmol/L


(-2-2) 


 


 


Arterial Blood pH


  


  


  7.29


(7.380-7.420) 


 


 


Arterial Blood Partial


Pressure CO2 


  


  31 mmHg


(38-42) 


 


 


Arterial Blood Partial


Pressure O2 


  


  180 mmHg


() 


 


 


Arterial Blood Oxygen Content


  


  


  11.1 Vol %


(12.0-20.0) 


 


 


Blood Gas Hemoglobin


  


  


  7.9 G/DL


(12.0-16.0) 


 


 


Test


  11/4/17


19:00 11/5/17


04:15 11/5/17


06:24 


 


 


Ammonia


  57 MCMOL/L


(11-32) 


  


  


 


 


Red Blood Count


  


  3.15 MIL/MM3


(4.00-5.30) 


  


 


 


Hemoglobin


  


  7.9 GM/DL


(11.6-15.3) 


  


 


 


Hematocrit


  


  24.5 %


(35.0-46.0) 


  


 


 


Mean Corpuscular Volume


  


  78.0 FL


(80.0-100.0) 


  


 


 


Mean Corpuscular Hemoglobin


  


  25.0 PG


(27.0-34.0) 


  


 


 


Red Cell Distribution Width


  


  18.9 %


(11.6-17.2) 


  


 


 


Mean Platelet Volume


  


  6.5 FL


(7.0-11.0) 


  


 


 


Neutrophils (%) (Auto)


  


  76.8 %


(16.0-70.0) 


  


 


 


Activated Partial


Thromboplast Time 


  17.8 SEC


(24.3-30.1) 


  


 


 


Blood Urea Nitrogen  6 MG/DL (7-18)   


 


Random Glucose


  


  111 MG/DL


() 


  


 


 


Total Protein


  


  6.0 GM/DL


(6.4-8.2) 


  


 


 


Albumin


  


  3.0 GM/DL


(3.4-5.0) 


  


 


 


Calcium Level


  


  7.9 MG/DL


(8.5-10.1) 


  


 


 


Aspartate Amino Transf


(AST/SGOT) 


  14 U/L (15-37) 


  


  


 


 


Chloride Level


  


  111 MEQ/L


() 


  


 


 


Carbon Dioxide Level


  


  20.9 MEQ/L


(21.0-32.0) 


  


 


 


Estimat Glomerular Filtration


Rate 


  88 ML/MIN


(>89) 


  


 


 


Iron Level


  


  22 MCG/DL


() 


  


 


 


Percent Iron Saturation  5.8 % (20-50)   


 


Blood Gas HCO3


  


  


  17 mmol/L


(22-26) 


 


 


Blood Gas Base Excess


  


  


  -7.3 mmol/L


(-2-2) 


 


 


Arterial Blood pH


  


  


  7.35


(7.380-7.420) 


 


 


Arterial Blood Partial


Pressure CO2 


  


  32 mmHg


(38-42) 


 


 


Arterial Blood Partial


Pressure O2 


  


  152 mmHg


() 


 


 


Arterial Blood Oxygen Content


  


  


  9.3 Vol %


(12.0-20.0) 


 


 


Blood Gas Hemoglobin


  


  


  6.6 G/DL


(12.0-16.0) 


 








Imaging





Last Impressions








Chest X-Ray 11/6/17 0600 Signed





Impressions: 





 Service Date/Time:  Monday, November 6, 2017 05:36 - CONCLUSION:  1. No acute 





 cardiopulmonary disease.     Brown Samayoa MD 


 


Pelvis X-Ray 11/4/17 1439 Signed





Impressions: 





 Service Date/Time:  Saturday, November 4, 2017 14:27 - CONCLUSION: Intact 





 pelvis.     Dash Andres MD 


 


Head CT 11/4/17 1439 Signed





Impressions: 





 Service Date/Time:  Saturday, November 4, 2017 14:54 - CONCLUSION:  Negative 





 noncontrast head CT.     Dash Andres MD 


 


Chest CT 11/4/17 1439 Signed





Impressions: 





 Service Date/Time:  Saturday, November 4, 2017 14:58 - CONCLUSION:  Mild 





 airspace consolidation of both lungs and tiny right, very small left pleural 





 effusions.     Dash Andres MD 


 


Cervical Spine CT 11/4/17 1439 Signed





Impressions: 





 Service Date/Time:  Saturday, November 4, 2017 14:54 - CONCLUSION:  1. No 

acute 





 fracture or acute appearing malalignment of the cervical spine. 2. Previous 





 anterior and posterior fusion as above. No bony bridging at C2/C3 but the 





 anterior fusion appears otherwise solid down to C6.     Dash Andres MD 


 


Abdomen/Pelvis CT 11/4/17 1439 Signed





Impressions: 





 Service Date/Time:  Saturday, November 4, 2017 14:56 - CONCLUSION:  No 

evidence 





 of acute trauma the abdomen or pelvis. Large stool throughout the colon.     





 Dash Andres MD 








PE at Discharge


GENERAL: 42 year old  female currently in C-collar, awake alert and 

oriented, following commands


SKIN: Warm and dry. 


HEAD: Atraumatic. Normocephalic. 


EYES: Pupils equal and round about 5 mm bilaterally and reactive. No scleral 

icterus. No injection or drainage. 


ENT: No nasal bleeding or discharge.  Mucous membranes pink and moist.  

Orotracheally intubated


NECK: Trachea midline. No JVD. 


CARDIOVASCULAR: Tachycardic, RR.  S1, S2.  No S4.  Without murmur


RESPIRATORY: Clear to auscultation. Breath sounds equal bilaterally. No 

accessory muscle use, no wheezing. 


GASTROINTESTINAL: Abdomen soft, non-tender, obese.  Hypoactive bowel sounds are 

appreciated


MUSCULOSKELETAL: Extremities without difficulty and peripheral edema. No 

obvious deformities. 


NEUROLOGICAL: Alert and oriented. Follows commands. 





Hospital Course


42 year-old female according to the paramedic report likely overdose atarax-was 

found down at the bottom of the staircase.  Her GCS was 3 on arrival she did 

not respond to Marcaine she was difficult access so that so that I was inserted 

to a tibia she remained hemodynamically normal with the low GCS.  Open arrival 

she was orotracheally intubated.  Fast negative exam by external trauma right 

femoral vein IV Cordis access was obtained and patient brought was brought to 

CAT scan for her workup.


Patient with AMS Acute encephalopathy  possible hydroxyzine overdose, resolved. 

Prior cervical discectomy/fusion.  CT brain 11/4 revealed no acute intracranial 

findings


CT C-spine revealed previous posterior cervicooccipital and cervico thoracic 

fusion at the level T2.  Discectomy and fusion procedure with anterior 

instrumentation of C2 through C6.  Chronic appearing anterior compression 

deformity of the C5 vertebral body with kyphotic deformity at C5/C6.


Was  intubated and successfully extubated currently satting well on room air. 


Urine drug screen + barbiturates. 


Ammonia level 57, give lactulose, monitor.


Holding home medications of mirtazapine 15 mg, fluoxetine 40 mg daily, 

gabapentin 600 mg 4 times a day and resume at DC. F/u gabapentin level and hold 

gabapentin  if need.. DC hydroxyzine 50 mg twice a day


F/U gabapentin level


Poison control [id number], continue supportive care recommendations


Patient improved DC to inpatient med/psych 


DC to psychiatric unit today. Cleared medically for DC.  Discussed with Dr Grant psych. Follow up gabapentin level and hold gabapentin if need. 

Monitor BP closely and adjust BP meds if necessarily. 














Neuro/Psych:





Acute encephalopathy  possible hydroxyzine overdose, resolved


Prior cervical discectomy/fusion





CT brain 11/4 revealed no acute intracranial findings


CT C-spine revealed previous posterior cervicooccipital and cervico thoracic 

fusion at the level T2.  Discectomy and fusion procedure with anterior 

instrumentation of C2 through C6.  Chronic appearing anterior compression 

deformity of the C5 vertebral body with kyphotic deformity at C5/C6.


Was  intubated and successfully extubated currently satting well on room air


Urine drug screen currently pending along with EtOH, salicylates and 

acetaminophen


Ammonia level 57, give lactulose, monitor.


Holding home medications of mirtazapine 15 mg, fluoxetine 40 mg daily, 

gabapentin 600 mg 4 times a day, been serving 1 mg twice a day, resume atv DC.  

Follow up gabapentin level urine  and hols if necessary. DC hydroxyzine 50 mg 

twice a day


F/U gabapentin level


Poison control [id number], continue supportive care recommendations








CV:





Sinus tachycardia


Hypertension





Continue normal saline at 84 cc an hour.


Received 1 L crystalloid in ED 11/4


Currently not requiring vasopressors.


On atenolol 50 mg po bid. Hydralazine prn and vasotec IV prn


Follow-up EKG -concern for QTC prolongation with fluoxetine, and QRS widening 

with hydroxyzine. EKG stable. 





Resp:





Acute respiratory failure





PRVC 16/550/1/5/100


Ventilator bundle


Albuterol/ipratropium aerosols with albuterol aerosols every 2 hours.  Dyspnea


Spontaneous breathing trials daily-obtain parameters


 





GI: 





Gastroesophageal reflux disease


History of diarrhea





NGT to [facility]


Pantoprazole for GI prophylaxis


Docusate sodium/senna 1 tablet twice a day for bowel regimen


Status post 50 g sorbitol and charcoal for gastric decontamination


Holding loperamide 2 mg as needed





:  





Meek catheter will be placed for accurate I's and O's in a critically ill 

patient





Endo:





Sliding-scale insulin if indicated to maintain euglycemia


 TSH level 1.950 WNL


  


Renal:





Monitor urine output


Accurate I's and O's


 


Heme:





Microcytic anemia





Check iron studies/TIBC and ferritin. With Iron deficiency. Staert ferrous 

sulfate. 


No indications for transfusion of blood proximally at this time.





ID:





Monitor for infection





FEN:





Hyponatremia





Replace electrolytes as clinically indicated per ICU electrolyte protocol





MSK:





PT evaluate and treat





Access


- Utilize peripheral IV.  Remove right femoral Cordis once peripheral IVs 

established





Prophylaxis


- GI - pantoprazole


- DVT - SCD/heparin subcutaneous





Discussed with the patient, nurse.


Discharge Planning


DC to psychiatric unit today. Cleared medically for DC.  Discussed with Dr Grant psych. Follow up gabapentin level and hold gabapentin if need. 

Monitor BP closely and adjust BP meds if necessarily.


Pt Condition on Discharge:  Stable


Discharge Disposition:  Disc to Psych Care Fac


Discharge Time:  > 30 minutes


Discharge Instructions


DIET: Follow Instructions for:  As Tolerated, No Restrictions


Activities you can perform:  Regular-No Restrictions


Follow up Referrals:  


PCP Follow-up - 2-3 Days





New Medications:  


Ferrous Sulfate (Ferosul) 325 Mg (65 Mg Iron) Tablet


325 MG PO BID@12,17 for iron deficiency anemia, #60 MG





 


Continued Medications:  


Atenolol (Atenolol) 50 Mg Tab


50 MG PO BID for Blood Pressure Management, #60 TAB 0 Refills





Clonidine (Clonidine) 0.2 Mg Tab


0.2 MG PO TID for Blood Pressure Management, #60 TAB 0 Refills





Fluoxetine (Fluoxetine) 40 Mg Cap


40 CAP PO DAILY, #30 CAP 0 Refills





Gabapentin (Gabapentin) 600 Mg Tab


600 MG PO QID, #60 TAB 0 Refills





Loperamide (Loperamide) 2 Mg Cap


2 MG PO AS DIRECTED PRN for DIARRHEA, CAP 0 Refills


One capsule after each loose stool.


 Not to exceed 8 capsules per day.


Mirtazapine (Mirtazapine) 15 Mg Tab


15 MG PO HS for Depression Control, #30 TAB 0 Refills





Pantoprazole (Pantoprazole) 40 Mg Tab


40 MG PO BID for Reflux, #30 TAB 0 Refills





 


Discontinued Medications:  


Benztropine (Benztropine) 0.5 Mg Tab


1 MG PO BID, #60 TAB 0 Refills





Hydroxyzine Pamoate (Hydroxyzine Pamoate) 50 Mg Cap


50 MG PO BID, CAP 0 Refills

















Bee Vo MD Nov 6, 2017 09:34

## 2017-11-06 NOTE — RADRPT
EXAM DATE/TIME:  11/06/2017 05:36 

 

[facility] COMPARISON:     

CHEST SINGLE AP, November 05, 2017, 4:59.

 

                     

INDICATIONS :     

Respiratory failure, short of breath

                     

 

MEDICAL HISTORY :            

respiratory failure   

 

SURGICAL HISTORY :     

None.   

 

ENCOUNTER:     

Subsequent                                        

 

ACUITY:     

2 days      

 

PAIN SCORE:     

0/10

 

LOCATION:     

Bilateral chest 

 

FINDINGS:     

A single view of the chest demonstrates the lungs to be symmetrically aerated without evidence of mas
s, infiltrate or effusion.  The cardiomediastinal contours are unremarkable.  Osseous structures are 
intact.

 

CONCLUSION:     

1. No acute cardiopulmonary disease.

 

 

 

 Brown Samayoa MD on November 06, 2017 at 6:31           

Board Certified Radiologist.

 This report was verified electronically.

## 2017-11-06 NOTE — HHI.PR
Subjective


Remarks


Patient appears in nad. Says she had some diarrhea. She was eating. No cp, sob, 

n/v/d/c. Was seen by psych Dr Grant recommends inpatient psych admission.





Objective


Vitals





Vital Signs








  Date Time  Temp Pulse Resp B/P (MAP) Pulse Ox O2 Delivery O2 Flow Rate FiO2


 


11/6/17 04:00 98.5 81 20 175/97 (123) 100   


 


11/6/17 01:52 98.6 81 20 191/91 (124) 100   


 


11/6/17 01:52 97.4 79 20 179/98 (125) 100   


 


11/5/17 22:30    177/87 (117)    


 


11/5/17 22:00      Room Air  


 


11/5/17 20:27  89      


 


11/5/17 20:10 99.5 93 20 182/88 (119) 100   


 


11/5/17 18:00  105      


 


11/5/17 16:00  94      


 


11/5/17 14:00  118      


 


11/5/17 13:10     98 Nasal Cannula 4 


 


11/5/17 12:00  123      


 


11/5/17 12:00        50


 


11/5/17 10:10     100   40


 


11/5/17 10:00  108      


 


11/5/17 08:29        50














I/O      


 


 11/5/17 11/5/17 11/5/17 11/6/17 11/6/17 11/6/17





 07:00 15:00 23:00 07:00 15:00 23:00


 


Intake Total 1175 ml 425.5 ml 1200 ml 480 ml  


 


Output Total 4160 ml  2200 ml 500 ml  


 


Balance -2985 ml 425.5 ml -1000 ml -20 ml  


 


      


 


Intake Oral   200 ml 480 ml  


 


IV Total 1175 ml 425.5 ml 1000 ml   


 


Output Urine Total 3600 ml  2200 ml 500 ml  


 


Gastric Drainage Total 560 ml     


 


# Bowel Movements 3  2 1  








Result Diagram:  


11/5/17 0415 11/5/17 0415





Imaging





Last Impressions








Chest X-Ray 11/6/17 0600 Signed





Impressions: 





 Service Date/Time:  Monday, November 6, 2017 05:36 - CONCLUSION:  1. No acute 





 cardiopulmonary disease.     Brown Samayoa MD 


 


Pelvis X-Ray 11/4/17 1549 Signed





Impressions: 





 Service Date/Time:  Saturday, November 4, 2017 14:27 - CONCLUSION: Intact 





 pelvis.     Dash Andres MD 


 


Head CT 11/4/17 1439 Signed





Impressions: 





 Service Date/Time:  Saturday, November 4, 2017 14:54 - CONCLUSION:  Negative 





 noncontrast head CT.     Dash Andres MD 


 


Chest CT 11/4/17 1439 Signed





Impressions: 





 Service Date/Time:  Saturday, November 4, 2017 14:58 - CONCLUSION:  Mild 





 airspace consolidation of both lungs and tiny right, very small left pleural 





 effusions.     Dash Andres MD 


 


Cervical Spine CT 11/4/17 1439 Signed





Impressions: 





 Service Date/Time:  Saturday, November 4, 2017 14:54 - CONCLUSION:  1. No 

acute 





 fracture or acute appearing malalignment of the cervical spine. 2. Previous 





 anterior and posterior fusion as above. No bony bridging at C2/C3 but the 





 anterior fusion appears otherwise solid down to C6.     Dash Andres MD 


 


Abdomen/Pelvis CT 11/4/17 1439 Signed





Impressions: 





 Service Date/Time:  Saturday, November 4, 2017 14:56 - CONCLUSION:  No 

evidence 





 of acute trauma the abdomen or pelvis. Large stool throughout the colon.     





 Dash Andres MD 








Objective Remarks


GENERAL: 42 year old  female currently in C-collar, awake alert and 

oriented, following commands


SKIN: Warm and dry. 


HEAD: Atraumatic. Normocephalic. 


EYES: Pupils equal and round about 5 mm bilaterally and reactive. No scleral 

icterus. No injection or drainage. 


ENT: No nasal bleeding or discharge.  Mucous membranes pink and moist.  

Orotracheally intubated


NECK: Trachea midline. No JVD. 


CARDIOVASCULAR: Tachycardic, RR.  S1, S2.  No S4.  Without murmur


RESPIRATORY: Clear to auscultation. Breath sounds equal bilaterally. No 

accessory muscle use, no wheezing. 


GASTROINTESTINAL: Abdomen soft, non-tender, obese.  Hypoactive bowel sounds are 

appreciated


MUSCULOSKELETAL: Extremities without difficulty and peripheral edema. No 

obvious deformities. 


NEUROLOGICAL: Alert and oriented. Follows commands. 








A/P


Assessment and Plan


Neuro/Psych:





Acute altered mental status possible hydroxyzine overdose, resolving


Prior cervical discectomy/fusion





CT brain 11/4 revealed no acute intracranial findings


CT C-spine revealed previous posterior cervicooccipital and cervico thoracic 

fusion at the level T2.  Discectomy and fusion procedure with anterior 

instrumentation of C2 through C6.  Chronic appearing anterior compression 

deformity of the C5 vertebral body with kyphotic deformity at C5/C6.


Was  intubated and successfully extubated currently satting well on room air


Urine drug screen currently pending along with EtOH, salicylates and 

acetaminophen


Ammonia level 57, give lactulose, monitor.


Holding home medications of mirtazapine 15 mg, fluoxetine 40 mg daily, 

gabapentin 600 mg 4 times a day, been serving 1 mg twice a day and hydroxyzine 

50 mg twice a day


F/U gabapentin level


Poison control [id number] pending, continue supportive care recommendations








CV:





Sinus tachycardia


Hypertension





Continue normal saline at 84 cc an hour.


Received 1 L crystalloid in ED 11/4


Currently not requiring vasopressors and/or antihypertensives.


Follow-up EKG -concern for QTC prolongation with fluoxetine, and QRS widening 

with hydroxyzine





Resp:





Acute respiratory failure





PRVC 16/550/1/5/100


Ventilator bundle


Albuterol/ipratropium aerosols with albuterol aerosols every 2 hours.  Dyspnea


Spontaneous breathing trials daily-obtain parameters


 





GI: 





Gastroesophageal reflux disease


History of diarrhea





NGT to [facility]


Pantoprazole for GI prophylaxis


Docusate sodium/senna 1 tablet twice a day for bowel regimen


Status post 50 g sorbitol and charcoal for gastric decontamination


Holding loperamide 2 mg as needed





:  





Meek catheter will be placed for accurate I's and O's in a critically ill 

patient





Endo:





Sliding-scale insulin if indicated to maintain euglycemia


 TSH level 1.950 WNL


  


Renal:





Monitor urine output


Accurate I's and O's


 


Heme:





Microcytic anemia





Check iron studies/TIBC and ferritin.


No indications for transfusion of blood proximally at this time.





ID:





Monitor for infection





FEN:





Hyponatremia





Replace electrolytes as clinically indicated per ICU electrolyte protocol





MSK:





PT evaluate and treat





Access


- Utilize peripheral IV.  Remove right femoral Cordis once peripheral IVs 

established





Prophylaxis


- GI - pantoprazole


- DVT - SCD/heparin subcutaneous





Discussed with the patient, nurse.


Discharge Planning


DC to psychiatric unit today. Cleared medically for DC.  Discussed with Bee Benton MD Nov 6, 2017 08:28
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
## 2017-11-06 NOTE — HHI.PR
Subjective


Remarks


Patient appears in nad. Says she had some diarrhea. She was eating. No cp, sob, 

n/v/d/c. Was seen by psych Dr Grant recommends inpatient psych admission.





Objective


Vitals





Vital Signs








  Date Time  Temp Pulse Resp B/P (MAP) Pulse Ox O2 Delivery O2 Flow Rate FiO2


 


11/6/17 04:00 98.5 81 20 175/97 (123) 100   


 


11/6/17 01:52 98.6 81 20 191/91 (124) 100   


 


11/6/17 01:52 97.4 79 20 179/98 (125) 100   


 


11/5/17 22:30    177/87 (117)    


 


11/5/17 22:00      Room Air  


 


11/5/17 20:27  89      


 


11/5/17 20:10 99.5 93 20 182/88 (119) 100   


 


11/5/17 18:00  105      


 


11/5/17 16:00  94      


 


11/5/17 14:00  118      


 


11/5/17 13:10     98 Nasal Cannula 4 


 


11/5/17 12:00  123      


 


11/5/17 12:00        50


 


11/5/17 10:10     100   40


 


11/5/17 10:00  108      


 


11/5/17 08:29        50














I/O      


 


 11/5/17 11/5/17 11/5/17 11/6/17 11/6/17 11/6/17





 07:00 15:00 23:00 07:00 15:00 23:00


 


Intake Total 1175 ml 425.5 ml 1200 ml 480 ml  


 


Output Total 4160 ml  2200 ml 500 ml  


 


Balance -2985 ml 425.5 ml -1000 ml -20 ml  


 


      


 


Intake Oral   200 ml 480 ml  


 


IV Total 1175 ml 425.5 ml 1000 ml   


 


Output Urine Total 3600 ml  2200 ml 500 ml  


 


Gastric Drainage Total 560 ml     


 


# Bowel Movements 3  2 1  








Result Diagram:  


11/5/17 0415 11/5/17 0415





Imaging





Last Impressions








Chest X-Ray 11/6/17 0600 Signed





Impressions: 





 Service Date/Time:  Monday, November 6, 2017 05:36 - CONCLUSION:  1. No acute 





 cardiopulmonary disease.     Brown Samayoa MD 


 


Pelvis X-Ray 11/4/17 1189 Signed





Impressions: 





 Service Date/Time:  Saturday, November 4, 2017 14:27 - CONCLUSION: Intact 





 pelvis.     Dash Andres MD 


 


Head CT 11/4/17 1439 Signed





Impressions: 





 Service Date/Time:  Saturday, November 4, 2017 14:54 - CONCLUSION:  Negative 





 noncontrast head CT.     Dash Andres MD 


 


Chest CT 11/4/17 1439 Signed





Impressions: 





 Service Date/Time:  Saturday, November 4, 2017 14:58 - CONCLUSION:  Mild 





 airspace consolidation of both lungs and tiny right, very small left pleural 





 effusions.     Dash Andres MD 


 


Cervical Spine CT 11/4/17 1439 Signed





Impressions: 





 Service Date/Time:  Saturday, November 4, 2017 14:54 - CONCLUSION:  1. No 

acute 





 fracture or acute appearing malalignment of the cervical spine. 2. Previous 





 anterior and posterior fusion as above. No bony bridging at C2/C3 but the 





 anterior fusion appears otherwise solid down to C6.     Dash Andres MD 


 


Abdomen/Pelvis CT 11/4/17 1439 Signed





Impressions: 





 Service Date/Time:  Saturday, November 4, 2017 14:56 - CONCLUSION:  No 

evidence 





 of acute trauma the abdomen or pelvis. Large stool throughout the colon.     





 Dash Andres MD 








Objective Remarks


GENERAL: 42 year old  female currently in C-collar, awake alert and 

oriented, following commands


SKIN: Warm and dry. 


HEAD: Atraumatic. Normocephalic. 


EYES: Pupils equal and round about 5 mm bilaterally and reactive. No scleral 

icterus. No injection or drainage. 


ENT: No nasal bleeding or discharge.  Mucous membranes pink and moist.  

Orotracheally intubated


NECK: Trachea midline. No JVD. 


CARDIOVASCULAR: Tachycardic, RR.  S1, S2.  No S4.  Without murmur


RESPIRATORY: Clear to auscultation. Breath sounds equal bilaterally. No 

accessory muscle use, no wheezing. 


GASTROINTESTINAL: Abdomen soft, non-tender, obese.  Hypoactive bowel sounds are 

appreciated


MUSCULOSKELETAL: Extremities without difficulty and peripheral edema. No 

obvious deformities. 


NEUROLOGICAL: Alert and oriented. Follows commands. 








A/P


Assessment and Plan


Neuro/Psych:





Acute altered mental status possible hydroxyzine overdose, resolving


Prior cervical discectomy/fusion





CT brain 11/4 revealed no acute intracranial findings


CT C-spine revealed previous posterior cervicooccipital and cervico thoracic 

fusion at the level T2.  Discectomy and fusion procedure with anterior 

instrumentation of C2 through C6.  Chronic appearing anterior compression 

deformity of the C5 vertebral body with kyphotic deformity at C5/C6.


Was  intubated and successfully extubated currently satting well on room air


Urine drug screen currently pending along with EtOH, salicylates and 

acetaminophen


Ammonia level 57, give lactulose, monitor.


Holding home medications of mirtazapine 15 mg, fluoxetine 40 mg daily, 

gabapentin 600 mg 4 times a day, been serving 1 mg twice a day and hydroxyzine 

50 mg twice a day


F/U gabapentin level


Poison control [id number] pending, continue supportive care recommendations








CV:





Sinus tachycardia


Hypertension





Continue normal saline at 84 cc an hour.


Received 1 L crystalloid in ED 11/4


Currently not requiring vasopressors and/or antihypertensives.


Follow-up EKG -concern for QTC prolongation with fluoxetine, and QRS widening 

with hydroxyzine





Resp:





Acute respiratory failure





PRVC 16/550/1/5/100


Ventilator bundle


Albuterol/ipratropium aerosols with albuterol aerosols every 2 hours.  Dyspnea


Spontaneous breathing trials daily-obtain parameters


 





GI: 





Gastroesophageal reflux disease


History of diarrhea





NGT to [facility]


Pantoprazole for GI prophylaxis


Docusate sodium/senna 1 tablet twice a day for bowel regimen


Status post 50 g sorbitol and charcoal for gastric decontamination


Holding loperamide 2 mg as needed





:  





Meek catheter will be placed for accurate I's and O's in a critically ill 

patient





Endo:





Sliding-scale insulin if indicated to maintain euglycemia


 TSH level 1.950 WNL


  


Renal:





Monitor urine output


Accurate I's and O's


 


Heme:





Microcytic anemia





Check iron studies/TIBC and ferritin.


No indications for transfusion of blood proximally at this time.





ID:





Monitor for infection





FEN:





Hyponatremia





Replace electrolytes as clinically indicated per ICU electrolyte protocol





MSK:





PT evaluate and treat





Access


- Utilize peripheral IV.  Remove right femoral Cordis once peripheral IVs 

established





Prophylaxis


- GI - pantoprazole


- DVT - SCD/heparin subcutaneous





Discussed with the patient, nurse.


Discharge Planning


DC to psychiatric unit today. Cleared medically for DC.  Discussed with Bee Benton MD Nov 6, 2017 08:28

## 2017-11-06 NOTE — HHI.DS
__________________________________________________





Discharge Summary


Admission Date


Nov 4, 2017 at 15:19


Discharge Date:  Nov 6, 2017


Admitting Diagnosis





unresponsive, respiratory failure, overdose





(1) Overdose


ICD Code:  T50.901A - Poisoning by unspecified drugs, medicaments and 

biological substances, accidental (unintentional), initial encounter


Status:  Acute


(2) Respiratory failure


ICD Code:  J96.90 - Respiratory failure, unspecified, unspecified whether with 

hypoxia or hypercapnia


Status:  Acute


(3) Unresponsive


ICD Code:  R41.89 - Other symptoms and signs involving cognitive functions and 

awareness


Status:  Acute


(4) Adjustment disorder with depressed mood


ICD Code:  F43.21 - Adjustment disorder with depressed mood


Procedures


intubation/extubation


Brief History - From Admission


42 year-old female according to the paramedic report likely overdose atarax-was 

found down at the bottom of the staircase.  Her GCS was 3 on arrival she did 

not respond to Marcaine she was difficult access so that so that I was inserted 

to a tibia she remained hemodynamically normal with the low GCS.  Open arrival 

she was orotracheally intubated.  Fast negative exam by external trauma right 

femoral vein IV Cordis access was obtained and patient brought was brought to 

CAT scan for her workup.


CBC/BMP:  


11/5/17 0415                                                                   

             11/5/17 0415





Significant Findings





Laboratory Tests








Test


  11/4/17


14:38 11/4/17


16:22 11/4/17


17:45 11/4/17


18:20


 


Red Blood Count


  3.44 MIL/MM3


(4.00-5.30) 


  


  


 


 


Hemoglobin


  8.4 GM/DL


(11.6-15.3) 


  


  


 


 


Bedside Hemoglobin


  9.5 G/DL


(12.0-17.0) 


  


  


 


 


Hematocrit


  26.7 %


(35.0-46.0) 


  


  


 


 


Bedside Hematocrit


  28.0 %


(38.0-51.0) 


  


  


 


 


Mean Corpuscular Volume


  77.4 FL


(80.0-100.0) 


  


  


 


 


Mean Corpuscular Hemoglobin


  24.5 PG


(27.0-34.0) 


  


  


 


 


Mean Corpuscular Hemoglobin


Concent 31.6 %


(32.0-36.0) 


  


  


 


 


Red Cell Distribution Width


  18.6 %


(11.6-17.2) 


  


  


 


 


Mean Platelet Volume


  6.5 FL


(7.0-11.0) 


  


  


 


 


Neutrophils (%) (Auto)


  82.6 %


(16.0-70.0) 


  


  


 


 


Eosinophils (%) (Auto)


  5.1 %


(0.0-4.0) 


  


  


 


 


Lymphocytes # (Auto)


  0.9 TH/MM3


(1.0-4.8) 


  


  


 


 


Eosinophils # (Auto)


  0.5 TH/MM3


(0-0.4) 


  


  


 


 


Activated Partial


Thromboplast Time 20.1 SEC


(24.3-30.1) 


  


  


 


 


Bedside Sodium


  130 MMOL/L


(138-146) 


  


  


 


 


Bedside Glucose


  116 MG/DL


(60-95) 


  


  


 


 


Iron Level


  26 MCG/DL


() 


  


  


 


 


Percent Iron Saturation 5.8 % (20-50)    


 


Total Bilirubin


  0.1 MG/DL


(0.2-1.0) 


  


  


 


 


Albumin


  3.3 GM/DL


(3.4-5.0) 


  


  


 


 


Acetaminophen Level


  LESS THAN 2.0


MCG/ML 


  


  


 


 


Urine Barbiturates Screen  POS (NEG)   


 


Blood Gas HCO3


  


  


  14 mmol/L


(22-26) 


 


 


Blood Gas Base Excess


  


  


  -10.9 mmol/L


(-2-2) 


 


 


Arterial Blood pH


  


  


  7.29


(7.380-7.420) 


 


 


Arterial Blood Partial


Pressure CO2 


  


  31 mmHg


(38-42) 


 


 


Arterial Blood Partial


Pressure O2 


  


  180 mmHg


() 


 


 


Arterial Blood Oxygen Content


  


  


  11.1 Vol %


(12.0-20.0) 


 


 


Blood Gas Hemoglobin


  


  


  7.9 G/DL


(12.0-16.0) 


 


 


Test


  11/4/17


19:00 11/5/17


04:15 11/5/17


06:24 


 


 


Ammonia


  57 MCMOL/L


(11-32) 


  


  


 


 


Red Blood Count


  


  3.15 MIL/MM3


(4.00-5.30) 


  


 


 


Hemoglobin


  


  7.9 GM/DL


(11.6-15.3) 


  


 


 


Hematocrit


  


  24.5 %


(35.0-46.0) 


  


 


 


Mean Corpuscular Volume


  


  78.0 FL


(80.0-100.0) 


  


 


 


Mean Corpuscular Hemoglobin


  


  25.0 PG


(27.0-34.0) 


  


 


 


Red Cell Distribution Width


  


  18.9 %


(11.6-17.2) 


  


 


 


Mean Platelet Volume


  


  6.5 FL


(7.0-11.0) 


  


 


 


Neutrophils (%) (Auto)


  


  76.8 %


(16.0-70.0) 


  


 


 


Activated Partial


Thromboplast Time 


  17.8 SEC


(24.3-30.1) 


  


 


 


Blood Urea Nitrogen  6 MG/DL (7-18)   


 


Random Glucose


  


  111 MG/DL


() 


  


 


 


Total Protein


  


  6.0 GM/DL


(6.4-8.2) 


  


 


 


Albumin


  


  3.0 GM/DL


(3.4-5.0) 


  


 


 


Calcium Level


  


  7.9 MG/DL


(8.5-10.1) 


  


 


 


Aspartate Amino Transf


(AST/SGOT) 


  14 U/L (15-37) 


  


  


 


 


Chloride Level


  


  111 MEQ/L


() 


  


 


 


Carbon Dioxide Level


  


  20.9 MEQ/L


(21.0-32.0) 


  


 


 


Estimat Glomerular Filtration


Rate 


  88 ML/MIN


(>89) 


  


 


 


Iron Level


  


  22 MCG/DL


() 


  


 


 


Percent Iron Saturation  5.8 % (20-50)   


 


Blood Gas HCO3


  


  


  17 mmol/L


(22-26) 


 


 


Blood Gas Base Excess


  


  


  -7.3 mmol/L


(-2-2) 


 


 


Arterial Blood pH


  


  


  7.35


(7.380-7.420) 


 


 


Arterial Blood Partial


Pressure CO2 


  


  32 mmHg


(38-42) 


 


 


Arterial Blood Partial


Pressure O2 


  


  152 mmHg


() 


 


 


Arterial Blood Oxygen Content


  


  


  9.3 Vol %


(12.0-20.0) 


 


 


Blood Gas Hemoglobin


  


  


  6.6 G/DL


(12.0-16.0) 


 








Imaging





Last Impressions








Chest X-Ray 11/6/17 0600 Signed





Impressions: 





 Service Date/Time:  Monday, November 6, 2017 05:36 - CONCLUSION:  1. No acute 





 cardiopulmonary disease.     Brown Samayoa MD 


 


Pelvis X-Ray 11/4/17 1439 Signed





Impressions: 





 Service Date/Time:  Saturday, November 4, 2017 14:27 - CONCLUSION: Intact 





 pelvis.     Dash Andres MD 


 


Head CT 11/4/17 1439 Signed





Impressions: 





 Service Date/Time:  Saturday, November 4, 2017 14:54 - CONCLUSION:  Negative 





 noncontrast head CT.     Dash Andres MD 


 


Chest CT 11/4/17 1439 Signed





Impressions: 





 Service Date/Time:  Saturday, November 4, 2017 14:58 - CONCLUSION:  Mild 





 airspace consolidation of both lungs and tiny right, very small left pleural 





 effusions.     Dash Andres MD 


 


Cervical Spine CT 11/4/17 1439 Signed





Impressions: 





 Service Date/Time:  Saturday, November 4, 2017 14:54 - CONCLUSION:  1. No 

acute 





 fracture or acute appearing malalignment of the cervical spine. 2. Previous 





 anterior and posterior fusion as above. No bony bridging at C2/C3 but the 





 anterior fusion appears otherwise solid down to C6.     Dash Andres MD 


 


Abdomen/Pelvis CT 11/4/17 1439 Signed





Impressions: 





 Service Date/Time:  Saturday, November 4, 2017 14:56 - CONCLUSION:  No 

evidence 





 of acute trauma the abdomen or pelvis. Large stool throughout the colon.     





 Dash Andres MD 








PE at Discharge


GENERAL: 42 year old  female currently in C-collar, awake alert and 

oriented, following commands


SKIN: Warm and dry. 


HEAD: Atraumatic. Normocephalic. 


EYES: Pupils equal and round about 5 mm bilaterally and reactive. No scleral 

icterus. No injection or drainage. 


ENT: No nasal bleeding or discharge.  Mucous membranes pink and moist.  

Orotracheally intubated


NECK: Trachea midline. No JVD. 


CARDIOVASCULAR: Tachycardic, RR.  S1, S2.  No S4.  Without murmur


RESPIRATORY: Clear to auscultation. Breath sounds equal bilaterally. No 

accessory muscle use, no wheezing. 


GASTROINTESTINAL: Abdomen soft, non-tender, obese.  Hypoactive bowel sounds are 

appreciated


MUSCULOSKELETAL: Extremities without difficulty and peripheral edema. No 

obvious deformities. 


NEUROLOGICAL: Alert and oriented. Follows commands. 





Hospital Course


42 year-old female according to the paramedic report likely overdose atarax-was 

found down at the bottom of the staircase.  Her GCS was 3 on arrival she did 

not respond to Marcaine she was difficult access so that so that I was inserted 

to a tibia she remained hemodynamically normal with the low GCS.  Open arrival 

she was orotracheally intubated.  Fast negative exam by external trauma right 

femoral vein IV Cordis access was obtained and patient brought was brought to 

CAT scan for her workup.


Patient with AMS Acute encephalopathy  possible hydroxyzine overdose, resolved. 

Prior cervical discectomy/fusion.  CT brain 11/4 revealed no acute intracranial 

findings


CT C-spine revealed previous posterior cervicooccipital and cervico thoracic 

fusion at the level T2.  Discectomy and fusion procedure with anterior 

instrumentation of C2 through C6.  Chronic appearing anterior compression 

deformity of the C5 vertebral body with kyphotic deformity at C5/C6.


Was  intubated and successfully extubated currently satting well on room air. 


Urine drug screen + barbiturates. 


Ammonia level 57, give lactulose, monitor.


Holding home medications of mirtazapine 15 mg, fluoxetine 40 mg daily, 

gabapentin 600 mg 4 times a day and resume at DC. F/u gabapentin level and hold 

gabapentin  if need.. DC hydroxyzine 50 mg twice a day


F/U gabapentin level


Poison control [id number], continue supportive care recommendations


Patient improved DC to inpatient med/psych 


DC to psychiatric unit today. Cleared medically for DC.  Discussed with Dr Grant psych. Follow up gabapentin level and hold gabapentin if need. 

Monitor BP closely and adjust BP meds if necessarily. 














Neuro/Psych:





Acute encephalopathy  possible hydroxyzine overdose, resolved


Prior cervical discectomy/fusion





CT brain 11/4 revealed no acute intracranial findings


CT C-spine revealed previous posterior cervicooccipital and cervico thoracic 

fusion at the level T2.  Discectomy and fusion procedure with anterior 

instrumentation of C2 through C6.  Chronic appearing anterior compression 

deformity of the C5 vertebral body with kyphotic deformity at C5/C6.


Was  intubated and successfully extubated currently satting well on room air


Urine drug screen currently pending along with EtOH, salicylates and 

acetaminophen


Ammonia level 57, give lactulose, monitor.


Holding home medications of mirtazapine 15 mg, fluoxetine 40 mg daily, 

gabapentin 600 mg 4 times a day, been serving 1 mg twice a day, resume atv DC.  

Follow up gabapentin level urine  and hols if necessary. DC hydroxyzine 50 mg 

twice a day


F/U gabapentin level


Poison control [id number], continue supportive care recommendations








CV:





Sinus tachycardia


Hypertension





Continue normal saline at 84 cc an hour.


Received 1 L crystalloid in ED 11/4


Currently not requiring vasopressors.


On atenolol 50 mg po bid. Hydralazine prn and vasotec IV prn


Follow-up EKG -concern for QTC prolongation with fluoxetine, and QRS widening 

with hydroxyzine. EKG stable. 





Resp:





Acute respiratory failure





PRVC 16/550/1/5/100


Ventilator bundle


Albuterol/ipratropium aerosols with albuterol aerosols every 2 hours.  Dyspnea


Spontaneous breathing trials daily-obtain parameters


 





GI: 





Gastroesophageal reflux disease


History of diarrhea





NGT to [facility]


Pantoprazole for GI prophylaxis


Docusate sodium/senna 1 tablet twice a day for bowel regimen


Status post 50 g sorbitol and charcoal for gastric decontamination


Holding loperamide 2 mg as needed





:  





Meek catheter will be placed for accurate I's and O's in a critically ill 

patient





Endo:





Sliding-scale insulin if indicated to maintain euglycemia


 TSH level 1.950 WNL


  


Renal:





Monitor urine output


Accurate I's and O's


 


Heme:





Microcytic anemia





Check iron studies/TIBC and ferritin. With Iron deficiency. Staert ferrous 

sulfate. 


No indications for transfusion of blood proximally at this time.





ID:





Monitor for infection





FEN:





Hyponatremia





Replace electrolytes as clinically indicated per ICU electrolyte protocol





MSK:





PT evaluate and treat





Access


- Utilize peripheral IV.  Remove right femoral Cordis once peripheral IVs 

established





Prophylaxis


- GI - pantoprazole


- DVT - SCD/heparin subcutaneous





Discussed with the patient, nurse.


Discharge Planning


DC to psychiatric unit today. Cleared medically for DC.  Discussed with Dr Grant psych. Follow up gabapentin level and hold gabapentin if need. 

Monitor BP closely and adjust BP meds if necessarily.


Pt Condition on Discharge:  Stable


Discharge Disposition:  Disc to Psych Care Fac


Discharge Time:  > 30 minutes


Discharge Instructions


DIET: Follow Instructions for:  As Tolerated, No Restrictions


Activities you can perform:  Regular-No Restrictions


Follow up Referrals:  


PCP Follow-up - 2-3 Days





New Medications:  


Ferrous Sulfate (Ferosul) 325 Mg (65 Mg Iron) Tablet


325 MG PO BID@12,17 for iron deficiency anemia, #60 MG





 


Continued Medications:  


Atenolol (Atenolol) 50 Mg Tab


50 MG PO BID for Blood Pressure Management, #60 TAB 0 Refills





Clonidine (Clonidine) 0.2 Mg Tab


0.2 MG PO TID for Blood Pressure Management, #60 TAB 0 Refills





Fluoxetine (Fluoxetine) 40 Mg Cap


40 CAP PO DAILY, #30 CAP 0 Refills





Gabapentin (Gabapentin) 600 Mg Tab


600 MG PO QID, #60 TAB 0 Refills





Loperamide (Loperamide) 2 Mg Cap


2 MG PO AS DIRECTED PRN for DIARRHEA, CAP 0 Refills


One capsule after each loose stool.


 Not to exceed 8 capsules per day.


Mirtazapine (Mirtazapine) 15 Mg Tab


15 MG PO HS for Depression Control, #30 TAB 0 Refills





Pantoprazole (Pantoprazole) 40 Mg Tab


40 MG PO BID for Reflux, #30 TAB 0 Refills





 


Discontinued Medications:  


Benztropine (Benztropine) 0.5 Mg Tab


1 MG PO BID, #60 TAB 0 Refills





Hydroxyzine Pamoate (Hydroxyzine Pamoate) 50 Mg Cap


50 MG PO BID, CAP 0 Refills

















Bee Vo MD Nov 6, 2017 09:34

## 2017-11-06 NOTE — HHI.DS
__________________________________________________





Discharge Summary


Admission Date


Nov 4, 2017 at 15:19


Discharge Date:  Nov 6, 2017


Admitting Diagnosis





unresponsive, respiratory failure, overdose





(1) Overdose


ICD Code:  T50.901A - Poisoning by unspecified drugs, medicaments and 

biological substances, accidental (unintentional), initial encounter


Status:  Acute


(2) Respiratory failure


ICD Code:  J96.90 - Respiratory failure, unspecified, unspecified whether with 

hypoxia or hypercapnia


Status:  Acute


(3) Unresponsive


ICD Code:  R41.89 - Other symptoms and signs involving cognitive functions and 

awareness


Status:  Acute


(4) Adjustment disorder with depressed mood


ICD Code:  F43.21 - Adjustment disorder with depressed mood


Procedures


intubation/extubation


Brief History - From Admission


42 year-old female according to the paramedic report likely overdose atarax-was 

found down at the bottom of the staircase.  Her GCS was 3 on arrival she did 

not respond to Marcaine she was difficult access so that so that I was inserted 

to a tibia she remained hemodynamically normal with the low GCS.  Open arrival 

she was orotracheally intubated.  Fast negative exam by external trauma right 

femoral vein IV Cordis access was obtained and patient brought was brought to 

CAT scan for her workup.


CBC/BMP:  


11/5/17 0415                                                                   

             11/5/17 0415





Significant Findings





Laboratory Tests








Test


  11/4/17


14:38 11/4/17


16:22 11/4/17


17:45 11/4/17


18:20


 


Red Blood Count


  3.44 MIL/MM3


(4.00-5.30) 


  


  


 


 


Hemoglobin


  8.4 GM/DL


(11.6-15.3) 


  


  


 


 


Bedside Hemoglobin


  9.5 G/DL


(12.0-17.0) 


  


  


 


 


Hematocrit


  26.7 %


(35.0-46.0) 


  


  


 


 


Bedside Hematocrit


  28.0 %


(38.0-51.0) 


  


  


 


 


Mean Corpuscular Volume


  77.4 FL


(80.0-100.0) 


  


  


 


 


Mean Corpuscular Hemoglobin


  24.5 PG


(27.0-34.0) 


  


  


 


 


Mean Corpuscular Hemoglobin


Concent 31.6 %


(32.0-36.0) 


  


  


 


 


Red Cell Distribution Width


  18.6 %


(11.6-17.2) 


  


  


 


 


Mean Platelet Volume


  6.5 FL


(7.0-11.0) 


  


  


 


 


Neutrophils (%) (Auto)


  82.6 %


(16.0-70.0) 


  


  


 


 


Eosinophils (%) (Auto)


  5.1 %


(0.0-4.0) 


  


  


 


 


Lymphocytes # (Auto)


  0.9 TH/MM3


(1.0-4.8) 


  


  


 


 


Eosinophils # (Auto)


  0.5 TH/MM3


(0-0.4) 


  


  


 


 


Activated Partial


Thromboplast Time 20.1 SEC


(24.3-30.1) 


  


  


 


 


Bedside Sodium


  130 MMOL/L


(138-146) 


  


  


 


 


Bedside Glucose


  116 MG/DL


(60-95) 


  


  


 


 


Iron Level


  26 MCG/DL


() 


  


  


 


 


Percent Iron Saturation 5.8 % (20-50)    


 


Total Bilirubin


  0.1 MG/DL


(0.2-1.0) 


  


  


 


 


Albumin


  3.3 GM/DL


(3.4-5.0) 


  


  


 


 


Acetaminophen Level


  LESS THAN 2.0


MCG/ML 


  


  


 


 


Urine Barbiturates Screen  POS (NEG)   


 


Blood Gas HCO3


  


  


  14 mmol/L


(22-26) 


 


 


Blood Gas Base Excess


  


  


  -10.9 mmol/L


(-2-2) 


 


 


Arterial Blood pH


  


  


  7.29


(7.380-7.420) 


 


 


Arterial Blood Partial


Pressure CO2 


  


  31 mmHg


(38-42) 


 


 


Arterial Blood Partial


Pressure O2 


  


  180 mmHg


() 


 


 


Arterial Blood Oxygen Content


  


  


  11.1 Vol %


(12.0-20.0) 


 


 


Blood Gas Hemoglobin


  


  


  7.9 G/DL


(12.0-16.0) 


 


 


Test


  11/4/17


19:00 11/5/17


04:15 11/5/17


06:24 


 


 


Ammonia


  57 MCMOL/L


(11-32) 


  


  


 


 


Red Blood Count


  


  3.15 MIL/MM3


(4.00-5.30) 


  


 


 


Hemoglobin


  


  7.9 GM/DL


(11.6-15.3) 


  


 


 


Hematocrit


  


  24.5 %


(35.0-46.0) 


  


 


 


Mean Corpuscular Volume


  


  78.0 FL


(80.0-100.0) 


  


 


 


Mean Corpuscular Hemoglobin


  


  25.0 PG


(27.0-34.0) 


  


 


 


Red Cell Distribution Width


  


  18.9 %


(11.6-17.2) 


  


 


 


Mean Platelet Volume


  


  6.5 FL


(7.0-11.0) 


  


 


 


Neutrophils (%) (Auto)


  


  76.8 %


(16.0-70.0) 


  


 


 


Activated Partial


Thromboplast Time 


  17.8 SEC


(24.3-30.1) 


  


 


 


Blood Urea Nitrogen  6 MG/DL (7-18)   


 


Random Glucose


  


  111 MG/DL


() 


  


 


 


Total Protein


  


  6.0 GM/DL


(6.4-8.2) 


  


 


 


Albumin


  


  3.0 GM/DL


(3.4-5.0) 


  


 


 


Calcium Level


  


  7.9 MG/DL


(8.5-10.1) 


  


 


 


Aspartate Amino Transf


(AST/SGOT) 


  14 U/L (15-37) 


  


  


 


 


Chloride Level


  


  111 MEQ/L


() 


  


 


 


Carbon Dioxide Level


  


  20.9 MEQ/L


(21.0-32.0) 


  


 


 


Estimat Glomerular Filtration


Rate 


  88 ML/MIN


(>89) 


  


 


 


Iron Level


  


  22 MCG/DL


() 


  


 


 


Percent Iron Saturation  5.8 % (20-50)   


 


Blood Gas HCO3


  


  


  17 mmol/L


(22-26) 


 


 


Blood Gas Base Excess


  


  


  -7.3 mmol/L


(-2-2) 


 


 


Arterial Blood pH


  


  


  7.35


(7.380-7.420) 


 


 


Arterial Blood Partial


Pressure CO2 


  


  32 mmHg


(38-42) 


 


 


Arterial Blood Partial


Pressure O2 


  


  152 mmHg


() 


 


 


Arterial Blood Oxygen Content


  


  


  9.3 Vol %


(12.0-20.0) 


 


 


Blood Gas Hemoglobin


  


  


  6.6 G/DL


(12.0-16.0) 


 








Imaging





Last Impressions








Chest X-Ray 11/6/17 0600 Signed





Impressions: 





 Service Date/Time:  Monday, November 6, 2017 05:36 - CONCLUSION:  1. No acute 





 cardiopulmonary disease.     Brown Samayoa MD 


 


Pelvis X-Ray 11/4/17 1439 Signed





Impressions: 





 Service Date/Time:  Saturday, November 4, 2017 14:27 - CONCLUSION: Intact 





 pelvis.     Dash Andres MD 


 


Head CT 11/4/17 1439 Signed





Impressions: 





 Service Date/Time:  Saturday, November 4, 2017 14:54 - CONCLUSION:  Negative 





 noncontrast head CT.     Dash Andres MD 


 


Chest CT 11/4/17 1439 Signed





Impressions: 





 Service Date/Time:  Saturday, November 4, 2017 14:58 - CONCLUSION:  Mild 





 airspace consolidation of both lungs and tiny right, very small left pleural 





 effusions.     Dash Andres MD 


 


Cervical Spine CT 11/4/17 1439 Signed





Impressions: 





 Service Date/Time:  Saturday, November 4, 2017 14:54 - CONCLUSION:  1. No 

acute 





 fracture or acute appearing malalignment of the cervical spine. 2. Previous 





 anterior and posterior fusion as above. No bony bridging at C2/C3 but the 





 anterior fusion appears otherwise solid down to C6.     Dash Andres MD 


 


Abdomen/Pelvis CT 11/4/17 1439 Signed





Impressions: 





 Service Date/Time:  Saturday, November 4, 2017 14:56 - CONCLUSION:  No 

evidence 





 of acute trauma the abdomen or pelvis. Large stool throughout the colon.     





 Dash Andres MD 








PE at Discharge


GENERAL: 42 year old  female currently in C-collar, awake alert and 

oriented, following commands


SKIN: Warm and dry. 


HEAD: Atraumatic. Normocephalic. 


EYES: Pupils equal and round about 5 mm bilaterally and reactive. No scleral 

icterus. No injection or drainage. 


ENT: No nasal bleeding or discharge.  Mucous membranes pink and moist.  

Orotracheally intubated


NECK: Trachea midline. No JVD. 


CARDIOVASCULAR: Tachycardic, RR.  S1, S2.  No S4.  Without murmur


RESPIRATORY: Clear to auscultation. Breath sounds equal bilaterally. No 

accessory muscle use, no wheezing. 


GASTROINTESTINAL: Abdomen soft, non-tender, obese.  Hypoactive bowel sounds are 

appreciated


MUSCULOSKELETAL: Extremities without difficulty and peripheral edema. No 

obvious deformities. 


NEUROLOGICAL: Alert and oriented. Follows commands. 





Hospital Course


42 year-old female according to the paramedic report likely overdose atarax-was 

found down at the bottom of the staircase.  Her GCS was 3 on arrival she did 

not respond to Marcaine she was difficult access so that so that I was inserted 

to a tibia she remained hemodynamically normal with the low GCS.  Open arrival 

she was orotracheally intubated.  Fast negative exam by external trauma right 

femoral vein IV Cordis access was obtained and patient brought was brought to 

CAT scan for her workup.


Patient with AMS Acute encephalopathy  possible hydroxyzine overdose, resolved. 

Prior cervical discectomy/fusion.  CT brain 11/4 revealed no acute intracranial 

findings


CT C-spine revealed previous posterior cervicooccipital and cervico thoracic 

fusion at the level T2.  Discectomy and fusion procedure with anterior 

instrumentation of C2 through C6.  Chronic appearing anterior compression 

deformity of the C5 vertebral body with kyphotic deformity at C5/C6.


Was  intubated and successfully extubated currently satting well on room air. 


Urine drug screen + barbiturates. 


Ammonia level 57, give lactulose, monitor.


Holding home medications of mirtazapine 15 mg, fluoxetine 40 mg daily, 

gabapentin 600 mg 4 times a day and resume at DC. F/u gabapentin level and hold 

gabapentin  if need.. DC hydroxyzine 50 mg twice a day


F/U gabapentin level


Poison control [id number], continue supportive care recommendations


Patient improved DC to inpatient med/psych 


DC to psychiatric unit today. Cleared medically for DC.  Discussed with Dr Grant psych. Follow up gabapentin level and hold gabapentin if need. 

Monitor BP closely and adjust BP meds if necessarily. 














Neuro/Psych:





Acute encephalopathy  possible hydroxyzine overdose, resolved


Prior cervical discectomy/fusion





CT brain 11/4 revealed no acute intracranial findings


CT C-spine revealed previous posterior cervicooccipital and cervico thoracic 

fusion at the level T2.  Discectomy and fusion procedure with anterior 

instrumentation of C2 through C6.  Chronic appearing anterior compression 

deformity of the C5 vertebral body with kyphotic deformity at C5/C6.


Was  intubated and successfully extubated currently satting well on room air


Urine drug screen currently pending along with EtOH, salicylates and 

acetaminophen


Ammonia level 57, give lactulose, monitor.


Holding home medications of mirtazapine 15 mg, fluoxetine 40 mg daily, 

gabapentin 600 mg 4 times a day, been serving 1 mg twice a day, resume atv DC.  

Follow up gabapentin level urine  and hols if necessary. DC hydroxyzine 50 mg 

twice a day


F/U gabapentin level


Poison control [id number], continue supportive care recommendations








CV:





Sinus tachycardia


Hypertension





Continue normal saline at 84 cc an hour.


Received 1 L crystalloid in ED 11/4


Currently not requiring vasopressors.


On atenolol 50 mg po bid. Hydralazine prn and vasotec IV prn


Follow-up EKG -concern for QTC prolongation with fluoxetine, and QRS widening 

with hydroxyzine. EKG stable. 





Resp:





Acute respiratory failure





PRVC 16/550/1/5/100


Ventilator bundle


Albuterol/ipratropium aerosols with albuterol aerosols every 2 hours.  Dyspnea


Spontaneous breathing trials daily-obtain parameters


 





GI: 





Gastroesophageal reflux disease


History of diarrhea





NGT to [facility]


Pantoprazole for GI prophylaxis


Docusate sodium/senna 1 tablet twice a day for bowel regimen


Status post 50 g sorbitol and charcoal for gastric decontamination


Holding loperamide 2 mg as needed





:  





Meek catheter will be placed for accurate I's and O's in a critically ill 

patient





Endo:





Sliding-scale insulin if indicated to maintain euglycemia


 TSH level 1.950 WNL


  


Renal:





Monitor urine output


Accurate I's and O's


 


Heme:





Microcytic anemia





Check iron studies/TIBC and ferritin. With Iron deficiency. Staert ferrous 

sulfate. 


No indications for transfusion of blood proximally at this time.





ID:





Monitor for infection





FEN:





Hyponatremia





Replace electrolytes as clinically indicated per ICU electrolyte protocol





MSK:





PT evaluate and treat





Access


- Utilize peripheral IV.  Remove right femoral Cordis once peripheral IVs 

established





Prophylaxis


- GI - pantoprazole


- DVT - SCD/heparin subcutaneous





Discussed with the patient, nurse.


Discharge Planning


DC to psychiatric unit today. Cleared medically for DC.  Discussed with Dr Grant psych. Follow up gabapentin level and hold gabapentin if need. 

Monitor BP closely and adjust BP meds if necessarily.


Pt Condition on Discharge:  Stable


Discharge Disposition:  Disc to Psych Care Fac


Discharge Time:  > 30 minutes


Discharge Instructions


DIET: Follow Instructions for:  As Tolerated, No Restrictions


Activities you can perform:  Regular-No Restrictions


Follow up Referrals:  


PCP Follow-up - 2-3 Days





New Medications:  


Ferrous Sulfate (Ferosul) 325 Mg (65 Mg Iron) Tablet


325 MG PO BID@12,17 for iron deficiency anemia, #60 MG





 


Continued Medications:  


Atenolol (Atenolol) 50 Mg Tab


50 MG PO BID for Blood Pressure Management, #60 TAB 0 Refills





Clonidine (Clonidine) 0.2 Mg Tab


0.2 MG PO TID for Blood Pressure Management, #60 TAB 0 Refills





Fluoxetine (Fluoxetine) 40 Mg Cap


40 CAP PO DAILY, #30 CAP 0 Refills





Gabapentin (Gabapentin) 600 Mg Tab


600 MG PO QID, #60 TAB 0 Refills





Loperamide (Loperamide) 2 Mg Cap


2 MG PO AS DIRECTED PRN for DIARRHEA, CAP 0 Refills


One capsule after each loose stool.


 Not to exceed 8 capsules per day.


Mirtazapine (Mirtazapine) 15 Mg Tab


15 MG PO HS for Depression Control, #30 TAB 0 Refills





Pantoprazole (Pantoprazole) 40 Mg Tab


40 MG PO BID for Reflux, #30 TAB 0 Refills





 


Discontinued Medications:  


Benztropine (Benztropine) 0.5 Mg Tab


1 MG PO BID, #60 TAB 0 Refills





Hydroxyzine Pamoate (Hydroxyzine Pamoate) 50 Mg Cap


50 MG PO BID, CAP 0 Refills

















Bee Vo MD Nov 6, 2017 09:34

## 2017-11-06 NOTE — HHI.PR
Subjective


Remarks


Patient appears in nad. Says she had some diarrhea. She was eating. No cp, sob, 

n/v/d/c. Was seen by psych Dr Grant recommends inpatient psych admission.





Objective


Vitals





Vital Signs








  Date Time  Temp Pulse Resp B/P (MAP) Pulse Ox O2 Delivery O2 Flow Rate FiO2


 


11/6/17 04:00 98.5 81 20 175/97 (123) 100   


 


11/6/17 01:52 98.6 81 20 191/91 (124) 100   


 


11/6/17 01:52 97.4 79 20 179/98 (125) 100   


 


11/5/17 22:30    177/87 (117)    


 


11/5/17 22:00      Room Air  


 


11/5/17 20:27  89      


 


11/5/17 20:10 99.5 93 20 182/88 (119) 100   


 


11/5/17 18:00  105      


 


11/5/17 16:00  94      


 


11/5/17 14:00  118      


 


11/5/17 13:10     98 Nasal Cannula 4 


 


11/5/17 12:00  123      


 


11/5/17 12:00        50


 


11/5/17 10:10     100   40


 


11/5/17 10:00  108      


 


11/5/17 08:29        50














I/O      


 


 11/5/17 11/5/17 11/5/17 11/6/17 11/6/17 11/6/17





 07:00 15:00 23:00 07:00 15:00 23:00


 


Intake Total 1175 ml 425.5 ml 1200 ml 480 ml  


 


Output Total 4160 ml  2200 ml 500 ml  


 


Balance -2985 ml 425.5 ml -1000 ml -20 ml  


 


      


 


Intake Oral   200 ml 480 ml  


 


IV Total 1175 ml 425.5 ml 1000 ml   


 


Output Urine Total 3600 ml  2200 ml 500 ml  


 


Gastric Drainage Total 560 ml     


 


# Bowel Movements 3  2 1  








Result Diagram:  


11/5/17 0415 11/5/17 0415





Imaging





Last Impressions








Chest X-Ray 11/6/17 0600 Signed





Impressions: 





 Service Date/Time:  Monday, November 6, 2017 05:36 - CONCLUSION:  1. No acute 





 cardiopulmonary disease.     Brown Samayoa MD 


 


Pelvis X-Ray 11/4/17 4509 Signed





Impressions: 





 Service Date/Time:  Saturday, November 4, 2017 14:27 - CONCLUSION: Intact 





 pelvis.     Dash Andres MD 


 


Head CT 11/4/17 1439 Signed





Impressions: 





 Service Date/Time:  Saturday, November 4, 2017 14:54 - CONCLUSION:  Negative 





 noncontrast head CT.     Dash Andres MD 


 


Chest CT 11/4/17 1439 Signed





Impressions: 





 Service Date/Time:  Saturday, November 4, 2017 14:58 - CONCLUSION:  Mild 





 airspace consolidation of both lungs and tiny right, very small left pleural 





 effusions.     Dash Andres MD 


 


Cervical Spine CT 11/4/17 1439 Signed





Impressions: 





 Service Date/Time:  Saturday, November 4, 2017 14:54 - CONCLUSION:  1. No 

acute 





 fracture or acute appearing malalignment of the cervical spine. 2. Previous 





 anterior and posterior fusion as above. No bony bridging at C2/C3 but the 





 anterior fusion appears otherwise solid down to C6.     Dash Andres MD 


 


Abdomen/Pelvis CT 11/4/17 1439 Signed





Impressions: 





 Service Date/Time:  Saturday, November 4, 2017 14:56 - CONCLUSION:  No 

evidence 





 of acute trauma the abdomen or pelvis. Large stool throughout the colon.     





 Dash Andres MD 








Objective Remarks


GENERAL: 42 year old  female currently in C-collar, awake alert and 

oriented, following commands


SKIN: Warm and dry. 


HEAD: Atraumatic. Normocephalic. 


EYES: Pupils equal and round about 5 mm bilaterally and reactive. No scleral 

icterus. No injection or drainage. 


ENT: No nasal bleeding or discharge.  Mucous membranes pink and moist.  

Orotracheally intubated


NECK: Trachea midline. No JVD. 


CARDIOVASCULAR: Tachycardic, RR.  S1, S2.  No S4.  Without murmur


RESPIRATORY: Clear to auscultation. Breath sounds equal bilaterally. No 

accessory muscle use, no wheezing. 


GASTROINTESTINAL: Abdomen soft, non-tender, obese.  Hypoactive bowel sounds are 

appreciated


MUSCULOSKELETAL: Extremities without difficulty and peripheral edema. No 

obvious deformities. 


NEUROLOGICAL: Alert and oriented. Follows commands. 








A/P


Assessment and Plan


Neuro/Psych:





Acute altered mental status possible hydroxyzine overdose, resolving


Prior cervical discectomy/fusion





CT brain 11/4 revealed no acute intracranial findings


CT C-spine revealed previous posterior cervicooccipital and cervico thoracic 

fusion at the level T2.  Discectomy and fusion procedure with anterior 

instrumentation of C2 through C6.  Chronic appearing anterior compression 

deformity of the C5 vertebral body with kyphotic deformity at C5/C6.


Was  intubated and successfully extubated currently satting well on room air


Urine drug screen currently pending along with EtOH, salicylates and 

acetaminophen


Ammonia level 57, give lactulose, monitor.


Holding home medications of mirtazapine 15 mg, fluoxetine 40 mg daily, 

gabapentin 600 mg 4 times a day, been serving 1 mg twice a day and hydroxyzine 

50 mg twice a day


F/U gabapentin level


Poison control [id number] pending, continue supportive care recommendations








CV:





Sinus tachycardia


Hypertension





Continue normal saline at 84 cc an hour.


Received 1 L crystalloid in ED 11/4


Currently not requiring vasopressors and/or antihypertensives.


Follow-up EKG -concern for QTC prolongation with fluoxetine, and QRS widening 

with hydroxyzine





Resp:





Acute respiratory failure





PRVC 16/550/1/5/100


Ventilator bundle


Albuterol/ipratropium aerosols with albuterol aerosols every 2 hours.  Dyspnea


Spontaneous breathing trials daily-obtain parameters


 





GI: 





Gastroesophageal reflux disease


History of diarrhea





NGT to [facility]


Pantoprazole for GI prophylaxis


Docusate sodium/senna 1 tablet twice a day for bowel regimen


Status post 50 g sorbitol and charcoal for gastric decontamination


Holding loperamide 2 mg as needed





:  





Meek catheter will be placed for accurate I's and O's in a critically ill 

patient





Endo:





Sliding-scale insulin if indicated to maintain euglycemia


 TSH level 1.950 WNL


  


Renal:





Monitor urine output


Accurate I's and O's


 


Heme:





Microcytic anemia





Check iron studies/TIBC and ferritin.


No indications for transfusion of blood proximally at this time.





ID:





Monitor for infection





FEN:





Hyponatremia





Replace electrolytes as clinically indicated per ICU electrolyte protocol





MSK:





PT evaluate and treat





Access


- Utilize peripheral IV.  Remove right femoral Cordis once peripheral IVs 

established





Prophylaxis


- GI - pantoprazole


- DVT - SCD/heparin subcutaneous





Discussed with the patient, nurse.


Discharge Planning


DC to psychiatric unit today. Cleared medically for DC.  Discussed with Bee Benton MD Nov 6, 2017 08:28

## 2017-11-07 VITALS
SYSTOLIC BLOOD PRESSURE: 160 MMHG | TEMPERATURE: 98.3 F | RESPIRATION RATE: 20 BRPM | OXYGEN SATURATION: 100 % | DIASTOLIC BLOOD PRESSURE: 93 MMHG | HEART RATE: 66 BPM

## 2017-11-07 VITALS
RESPIRATION RATE: 18 BRPM | DIASTOLIC BLOOD PRESSURE: 88 MMHG | HEART RATE: 73 BPM | OXYGEN SATURATION: 93 % | SYSTOLIC BLOOD PRESSURE: 163 MMHG | TEMPERATURE: 97.4 F

## 2017-11-07 VITALS — SYSTOLIC BLOOD PRESSURE: 179 MMHG | DIASTOLIC BLOOD PRESSURE: 100 MMHG | HEART RATE: 84 BPM

## 2017-11-07 VITALS
TEMPERATURE: 98 F | HEART RATE: 80 BPM | SYSTOLIC BLOOD PRESSURE: 187 MMHG | RESPIRATION RATE: 20 BRPM | OXYGEN SATURATION: 97 % | DIASTOLIC BLOOD PRESSURE: 96 MMHG

## 2017-11-07 VITALS
TEMPERATURE: 97.4 F | HEART RATE: 73 BPM | DIASTOLIC BLOOD PRESSURE: 88 MMHG | OXYGEN SATURATION: 93 % | RESPIRATION RATE: 18 BRPM | SYSTOLIC BLOOD PRESSURE: 163 MMHG

## 2017-11-07 LAB
ALP SERPL-CCNC: 124 U/L (ref 45–117)
ALT SERPL-CCNC: 15 U/L (ref 10–53)
ANION GAP SERPL CALC-SCNC: 12 MEQ/L (ref 5–15)
ANION GAP SERPL CALC-SCNC: 13 MEQ/L (ref 5–15)
AST SERPL-CCNC: 19 U/L (ref 15–37)
BILIRUB SERPL-MCNC: 0.4 MG/DL (ref 0.2–1)
BUN SERPL-MCNC: 4 MG/DL (ref 7–18)
BUN SERPL-MCNC: 5 MG/DL (ref 7–18)
CHLORIDE SERPL-SCNC: 90 MEQ/L (ref 98–107)
CHLORIDE SERPL-SCNC: 90 MEQ/L (ref 98–107)
GFR SERPLBLD BASED ON 1.73 SQ M-ARVRAT: 138 ML/MIN (ref 89–?)
GFR SERPLBLD BASED ON 1.73 SQ M-ARVRAT: 149 ML/MIN (ref 89–?)
HCO3 BLD-SCNC: 23.7 MEQ/L (ref 21–32)
HCO3 BLD-SCNC: 24.7 MEQ/L (ref 21–32)
MAGNESIUM SERPL-MCNC: 1.7 MG/DL (ref 1.5–2.5)
POTASSIUM SERPL-SCNC: 2.4 MEQ/L (ref 3.5–5.1)
POTASSIUM SERPL-SCNC: 2.8 MEQ/L (ref 3.5–5.1)
SODIUM SERPL-SCNC: 126 MEQ/L (ref 136–145)
SODIUM SERPL-SCNC: 128 MEQ/L (ref 136–145)

## 2017-11-07 RX ADMIN — POTASSIUM CHLORIDE SCH MLS/HR: 200 INJECTION, SOLUTION INTRAVENOUS at 14:33

## 2017-11-07 RX ADMIN — SODIUM CHLORIDE AND POTASSIUM CHLORIDE SCH MLS/HR: 9; 1.49 INJECTION, SOLUTION INTRAVENOUS at 18:30

## 2017-11-07 RX ADMIN — SODIUM CHLORIDE AND POTASSIUM CHLORIDE SCH MLS/HR: 9; 1.49 INJECTION, SOLUTION INTRAVENOUS at 20:40

## 2017-11-07 RX ADMIN — ACETAMINOPHEN PRN MG: 325 TABLET ORAL at 01:51

## 2017-11-07 RX ADMIN — PROMETHAZINE HYDROCHLORIDE PRN MG: 25 TABLET ORAL at 05:33

## 2017-11-07 RX ADMIN — ACETAMINOPHEN PRN MG: 325 TABLET ORAL at 18:39

## 2017-11-07 RX ADMIN — PANTOPRAZOLE SCH MG: 40 TABLET, DELAYED RELEASE ORAL at 07:34

## 2017-11-07 RX ADMIN — ACETAMINOPHEN PRN MG: 325 TABLET ORAL at 06:16

## 2017-11-07 RX ADMIN — ACETAMINOPHEN PRN MG: 325 TABLET ORAL at 22:56

## 2017-11-07 RX ADMIN — MIRTAZAPINE SCH MG: 15 TABLET, FILM COATED ORAL at 20:32

## 2017-11-07 RX ADMIN — POTASSIUM CHLORIDE SCH MLS/HR: 200 INJECTION, SOLUTION INTRAVENOUS at 17:13

## 2017-11-07 RX ADMIN — PROMETHAZINE HYDROCHLORIDE PRN MG: 25 TABLET ORAL at 13:02

## 2017-11-07 RX ADMIN — DULOXETINE HYDROCHLORIDE SCH MG: 20 CAPSULE, DELAYED RELEASE ORAL at 20:33

## 2017-11-07 RX ADMIN — PANTOPRAZOLE SCH MG: 40 TABLET, DELAYED RELEASE ORAL at 20:33

## 2017-11-07 RX ADMIN — FERROUS SULFATE TAB 325 MG (65 MG ELEMENTAL FE) SCH MG: 325 (65 FE) TAB at 12:00

## 2017-11-07 RX ADMIN — ACETAMINOPHEN PRN MG: 325 TABLET ORAL at 14:38

## 2017-11-07 RX ADMIN — ATENOLOL SCH MG: 50 TABLET ORAL at 07:35

## 2017-11-07 RX ADMIN — PROMETHAZINE HYDROCHLORIDE PRN MG: 25 TABLET ORAL at 22:56

## 2017-11-07 RX ADMIN — ACETAMINOPHEN PRN MG: 325 TABLET ORAL at 10:18

## 2017-11-07 RX ADMIN — ATENOLOL SCH MG: 50 TABLET ORAL at 20:33

## 2017-11-07 RX ADMIN — FERROUS SULFATE TAB 325 MG (65 MG ELEMENTAL FE) SCH MG: 325 (65 FE) TAB at 17:00

## 2017-11-07 NOTE — RADRPT
EXAM DATE/TIME:  2017 09:27 

 

[facility] COMPARISON:     

CT ABDOMEN & PELVIS W CONTRAST, 2017, 14:56.

        

 

 

INDICATIONS :     

Abdominal pain. 

                     

 

MEDICAL HISTORY :     

Hypertension. Arthritis.   Seizures. Migrianes. Tachycardia. COPD. Ulcer. PTSD. Anxiety. 

 

SURGICAL HISTORY :     

 section. Hysterectomy.   Cervical fusion. Blood transfusions. 

 

ENCOUNTER:     

Initial

 

ACUITY:     

4-6 days

 

PAIN SCORE:     

8/10

 

LOCATION:         

Abdomen. 

                     

MEASUREMENTS:     

 

LIVER:     

15.6 cm length 

 

COMMON DUCT:     

5 mm

 

RIGHT KIDNEY:     

11.4 x 5.0 x 5.2 cm

 

LEFT KIDNEY:     

13.5 x 4.5 x 5.0 cm

 

SPLEEN:     

10.7 cm length

 

AORTA:     

2.2cm maximal      

 

FINDINGS:     

 

LIVER:     

Normal echotexture without focal lesion or ductal dilatation.  

 

COMMON DUCT:     

No intraluminal mass or stone visualized.  

 

GALLBLADDER:     

Contains no stones, demonstrates no wall thickening or pericholecystic fluid.  

 

PANCREAS:     

Left kidney is normal visualized secondary to overlying bowel gas.

 

RIGHT KIDNEY:     

No hydronephrosis, stone or mass.  

 

LEFT KIDNEY:     

No hydronephrosis, stone or mass.  

 

SPLEEN:     

No focal lesion.  

 

AORTA:     

Non aneurysmal.  

 

IVC:     

Within normal limits.  

 

CONCLUSION:     

No acute finding is identified. The pancreas is not adequately visualized secondary to bowel gas.

 

 

 

 Dash Ramirez MD on 2017 at 10:07           

Board Certified Radiologist.

 This report was verified electronically.

## 2017-11-07 NOTE — RADRPT
EXAM DATE/TIME:  2017 14:13 

 

[facility] COMPARISON:     

No previous studies available for comparison.

 

                     

INDICATIONS :     

Abdominal pain and vomiting. 

                     

 

MEDICAL HISTORY :            

Hypertension. Arthritis. Seizures. Migrianes. Tachycardia. COPD. Ulcer.   

 

SURGICAL HISTORY :     

 section. Appendectomy.  

 

ENCOUNTER:     

Initial                                        

 

ACUITY:     

4 - 6 days      

 

PAIN SCORE:     

8/10

 

LOCATION:     

Bilateral  abdomen. 

 

FINDINGS:     

Intestinal gas pattern is nonspecific and benign. There are phleboliths noted over the pelvis. No paty
picious calcific densities. Nothing to suggest mass or visceromegaly. Regional skeleton is intact.

 

CONCLUSION:     

Nonspecific, benign abdomen appearance.

 

 

 

 Dash Mcguire MD on 2017 at 14:48           

Board Certified Radiologist.

 This report was verified electronically.
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
## 2017-11-07 NOTE — PD.CONS
HPI


Service


[facility]


Consult Requested By


Dr. Sahni


Reason for Consult


HTN and abdominal pain


Primary Care Physician


Unknown


Diagnoses:  


History of Present Illness


Pt is a 42 yr old female w PMHx borderline personality d/o, depression, GERD, 

chronic diarrhea, HTN, migraine HA, supraventricular tachycardia/heart murmur, 

PTSD is admitted to the inpatient psychiatric unit because of drug overdose. Pt 

tells me that she doesn't remember the events and doesn't know "where the 

atarax overdose comes from" as she denies being on atarax. Pt states that she 

was alone at home, had just taken a shower as it was still very early that 

morning, then tells me that she was found down the stairs unresponsive. Pt was 

admitted to the ICU unit and was intubated then. She then got extubated and 

transferred to the hospitalist service. Pt denied any CP/SOB/lighheadeness or 

dizziness prior to the event. states that her mother passed away last february 

and she has been depressed because of this. Pt tells me that right now she 

feels nauseous, has a headache and complains to me that she is only getting 

tylenol and wants her fioricet. she also wants her gabapentin. she denies any CP

/SOB. states she vomitted a few times and has been dry heaving. allergic to 

zofran. 


Discussed w RN, pt was transferred from medical floor on 11/6/17. Per RN, 

recommendations have been to hold gabapentin until level comes back and avoid 

fioricet. 


Pt never complained to me about a headache but then starts asking about her 

meds and wants to have them then tells me that she has a headache. 


Hospitalist service consulted for HTN and abdominal pain





Review of Systems


Except as stated in HPI:  all other systems reviewed are Neg





Past Family Social History


Allergies:  


Coded Allergies:  


     Sulfa (Sulfonamide Antibiotics) (Verified  Allergy, Severe, 11/6/17)


     azithromycin (Verified  Allergy, Severe, 11/6/17)


     ondansetron (Verified  Allergy, Severe, Swelling, 11/5/17)


     cyclobenzaprine (Verified  Allergy, Intermediate, 11/5/17)


     propoxyphene (Verified  Allergy, Intermediate, 11/5/17)


Past Medical History


borderline personality d/o, depression, GERD, chronic diarrhea, HTN, migraine HA

, supraventricular tachycardia/heart murmur, PTSD


Past Surgical History


neck sx: cervical fusion/discectomy C2-C6. 


hysterectomy


appendectomy


2 c/s


Reported Medications





Reported Meds & Active Scripts


Active


Ferosul (Ferrous Sulfate) 325 Mg (65 Mg Iron) Tablet 325 Mg PO BID@12,17


Reported


Atenolol 50 Mg Tab 50 Mg PO BID


Clonidine (Clonidine HCl) 0.2 Mg Tab 0.2 Mg PO TID


Loperamide (Loperamide HCl) 2 Mg Cap 2 Mg PO AS DIRECTED PRN


     One capsule after each loose stool.


     Not to exceed 8 capsules per day.


Pantoprazole (Pantoprazole Sodium) 40 Mg Tab 40 Mg PO BID


Gabapentin 600 Mg Tab 600 Mg PO QID


Fluoxetine (Fluoxetine HCl) 40 Mg Cap 40 Cap PO DAILY


Mirtazapine 15 Mg Tab 15 Mg PO HS


Family History


mother: DM, COPD


father unknown as he wasn't in her life


Social History


smokes 5 cigs/day x 1 year


denies alcohol use and illegal drug use





Physical Exam


Vital Signs





Vital Signs








  Date Time  Temp Pulse Resp B/P (MAP) Pulse Ox O2 Delivery O2 Flow Rate FiO2


 


11/7/17 07:38  84  179/100 (126)    


 


11/7/17 06:01 98.0 80 20 187/96 (126) 97   


 


11/6/17 18:41 98.0 82 24 176/100 (125) 93   


 


11/6/17 15:57  91  165/95 (118)    


 


11/6/17 14:01 98.1 86 22 190/102 (131) 100   





    Manual Cuff/Palpation    








Physical Exam


GENERAL: laying in bed, somewhat anxious


SKIN: No rashes, ecchymoses or lesions. Cool and dry.


HEAD: Atraumatic. Normocephalic. No temporal or scalp tenderness.


EYES: Pupils equal, dilated, round and reactive. Extraocular motions intact. No 

scleral icterus. No injection or drainage. 


ENT: Nose without  drainage. Throat without erythema, tonsillar hypertrophy or 

exudate. Uvula midline. Airway patent.


NECK: Trachea midline.  Supple, nontender, no meningeal signs.


CARDIOVASCULAR: Regular rate and rhythm. I didn't appreciate a murmur.


RESPIRATORY: Clear to auscultation. Breath sounds equal bilaterally. No wheezes 


GASTROINTESTINAL: Abdomen soft, some discomfort w deep palpation, nondistended. 

No palpable masses. No guarding.


MUSCULOSKELETAL: Extremities without  edema. No joint tenderness, effusion, or 

edema noted. No calf tenderness. Negative Homans sign bilaterally.


NEUROLOGICAL: Awake and alert. Cranial nerves II through XII intact.  Motor and 

sensory grossly within normal limits. Five out of 5 muscle strength in all 

muscle groups.  Normal speech.





Assessment and Plan


Assessment and Plan


borderline personality d/o, depression,  PTSD, atarax overdose: Management per 

psychiatry





HTN: uncontrolled. RN notified me that pt refuses to take lisinopril as she 

states that it makes her BP go up. Pt currently on clonidine 0.2mg po TID and 

atenolol 50mg po BID. Will d/c lisinopril and switch to amlodipine 10mg po 

daily. consider starting her on hydralazine scheduled if BP's not better 

controlled. She also has prn vasotec and hydralazine. monitor. will put her on 

a heart healthy diet





N/V: she has po phenergan however if unable to take po may take IV.  





Abdominal pain: unsure what the etiology is. Pt appears comfortable and didn't 

complain to me. Will check CMP, lipase, abdominal u/s/KUB





GERD: on protonix BID





chronic diarrhea: on loperamide





migraine HA: will hold off on fioricet per psych recs. Continue tylenol. 

Consider giving her one time dose of sumatriptan if worsening.





supraventricular tachycardia/heart murmur: on atenolol








Thank you for allowing me to take part of Mrs Tapia's care, I will 

continue to follow


Code Status


full


Discussed Condition With


patient and RN











Vani Medina MD Nov 7, 2017 08:34

## 2017-11-08 VITALS
TEMPERATURE: 97.2 F | HEART RATE: 80 BPM | SYSTOLIC BLOOD PRESSURE: 144 MMHG | RESPIRATION RATE: 18 BRPM | OXYGEN SATURATION: 99 % | DIASTOLIC BLOOD PRESSURE: 85 MMHG

## 2017-11-08 VITALS
TEMPERATURE: 98.2 F | HEART RATE: 75 BPM | DIASTOLIC BLOOD PRESSURE: 98 MMHG | OXYGEN SATURATION: 100 % | RESPIRATION RATE: 18 BRPM | SYSTOLIC BLOOD PRESSURE: 182 MMHG

## 2017-11-08 LAB
ANION GAP SERPL CALC-SCNC: 14 MEQ/L (ref 5–15)
BUN SERPL-MCNC: 6 MG/DL (ref 7–18)
CHLORIDE SERPL-SCNC: 91 MEQ/L (ref 98–107)
GFR SERPLBLD BASED ON 1.73 SQ M-ARVRAT: 135 ML/MIN (ref 89–?)
HCO3 BLD-SCNC: 21.2 MEQ/L (ref 21–32)
POTASSIUM SERPL-SCNC: 2.9 MEQ/L (ref 3.5–5.1)
SODIUM SERPL-SCNC: 126 MEQ/L (ref 136–145)

## 2017-11-08 RX ADMIN — POTASSIUM CHLORIDE SCH MLS/HR: 200 INJECTION, SOLUTION INTRAVENOUS at 13:34

## 2017-11-08 RX ADMIN — POTASSIUM CHLORIDE ONE MEQ: 20 TABLET, EXTENDED RELEASE ORAL at 13:00

## 2017-11-08 RX ADMIN — DULOXETINE HYDROCHLORIDE SCH MG: 20 CAPSULE, DELAYED RELEASE ORAL at 08:32

## 2017-11-08 RX ADMIN — PANTOPRAZOLE SCH MG: 40 TABLET, DELAYED RELEASE ORAL at 20:56

## 2017-11-08 RX ADMIN — BENZONATATE PRN MG: 100 CAPSULE ORAL at 18:02

## 2017-11-08 RX ADMIN — MIRTAZAPINE SCH MG: 15 TABLET, FILM COATED ORAL at 20:56

## 2017-11-08 RX ADMIN — FERROUS SULFATE TAB 325 MG (65 MG ELEMENTAL FE) SCH MG: 325 (65 FE) TAB at 12:00

## 2017-11-08 RX ADMIN — ATENOLOL SCH MG: 50 TABLET ORAL at 20:56

## 2017-11-08 RX ADMIN — DULOXETINE HYDROCHLORIDE SCH MG: 20 CAPSULE, DELAYED RELEASE ORAL at 10:40

## 2017-11-08 RX ADMIN — PROMETHAZINE HYDROCHLORIDE PRN MG: 25 TABLET ORAL at 18:02

## 2017-11-08 RX ADMIN — ACETAMINOPHEN PRN MG: 325 TABLET ORAL at 18:02

## 2017-11-08 RX ADMIN — PANTOPRAZOLE SCH MG: 40 TABLET, DELAYED RELEASE ORAL at 08:30

## 2017-11-08 RX ADMIN — POTASSIUM CHLORIDE ONE MEQ: 20 TABLET, EXTENDED RELEASE ORAL at 13:34

## 2017-11-08 RX ADMIN — FERROUS SULFATE TAB 325 MG (65 MG ELEMENTAL FE) SCH MG: 325 (65 FE) TAB at 17:00

## 2017-11-08 RX ADMIN — POTASSIUM CHLORIDE SCH MLS/HR: 200 INJECTION, SOLUTION INTRAVENOUS at 15:26

## 2017-11-08 RX ADMIN — ATENOLOL SCH MG: 50 TABLET ORAL at 08:30

## 2017-11-08 RX ADMIN — PROMETHAZINE HYDROCHLORIDE PRN MG: 25 TABLET ORAL at 08:31

## 2017-11-08 RX ADMIN — DULOXETINE HYDROCHLORIDE SCH MG: 20 CAPSULE, DELAYED RELEASE ORAL at 20:56

## 2017-11-08 RX ADMIN — ACETAMINOPHEN PRN MG: 325 TABLET ORAL at 13:39

## 2017-11-08 RX ADMIN — ACETAMINOPHEN PRN MG: 325 TABLET ORAL at 08:31

## 2017-11-08 NOTE — HHI.PR
Subjective


Remarks


States she feels a lot better. not able to tolerate po KCl (pill form) but 

doing ok w IV. wants something for cough like cough drops. Asks about her 

fioricet and gabapentin and insists on being on these.


She now tells me that she takes phenobarbital for seizures 60.8mg po TID. 

however during my interview she never mentioned hx of sx to me and had denied 

this when I had asked her.





Objective


Vitals





Vital Signs








  Date Time  Temp Pulse Resp B/P (MAP) Pulse Ox O2 Delivery O2 Flow Rate FiO2


 


11/8/17 09:31   16     


 


11/8/17 05:45 98.2 75 18 182/98 (126) 100   


 


11/7/17 22:39 97.4 73 18 163/88 (113) 93   


 


11/7/17 17:18 97.4 73 18 163/88 (113) 93   














I/O      


 


 11/7/17 11/7/17 11/7/17 11/8/17 11/8/17 11/8/17





 07:00 15:00 23:00 07:00 15:00 23:00


 


Intake Total 960 ml  480 ml 240 ml  100 ml


 


Balance 960 ml  480 ml 240 ml  100 ml


 


      


 


Intake Oral 960 ml  480 ml 240 ml  


 


IV Total      100 ml


 


# Voids 5  3 2  








Result Diagram:  


11/8/17 0930





Imaging





Last Impressions








Abdomen X-Ray 11/7/17 0000 Signed





Impressions: 





 Service Date/Time:  Tuesday, November 7, 2017 14:13 - CONCLUSION:  Nonspecific

, 





 benign abdomen appearance.     Dash Mcguire MD 


 


Abdomen Ultrasound 11/7/17 0000 Signed





Impressions: 





 Service Date/Time:  Tuesday, November 7, 2017 09:27 - CONCLUSION:  No acute 





 finding is identified. The pancreas is not adequately visualized secondary to 





 bowel gas.     Dash Ramirez MD 








Objective Remarks


GENERAL: laying in bed, somewhat anxious


EYES:  Extraocular motions intact. No scleral icterus. No injection or 

drainage. 


ENT: Nose without  drainage.  Airway patent.


NECK: Trachea midline.  


CARDIOVASCULAR: Regular rate and rhythm. I didn't appreciate a murmur.


RESPIRATORY: Clear to auscultation. Breath sounds equal bilaterally. No wheezes 


GASTROINTESTINAL: Abdomen soft, non tender, nondistended.  


MUSCULOSKELETAL: Extremities without  edema.  No calf tenderness. Negative 

Homans sign bilaterally.


NEUROLOGICAL: Awake and alert. Cranial nerves II through XII intact.  Motor and 

sensory grossly within normal limits.





A/P


Assessment and Plan


borderline personality d/o, depression,  PTSD, atarax overdose: Management per 

psychiatry





HTN: uncontrolled. RN notified me that pt refuses to take lisinopril as she 

states that it makes her BP go up. Pt currently on clonidine 0.2mg po TID and 

atenolol 50mg po BID. now on amlodipine 10mg po daily. consider starting her on 

hydralazine scheduled if BP's not better controlled. BP this pm was 142/90.    

prn vasotec and hydralazine available. monitor.  on a heart healthy diet





Hypokalemia: slowly correcting. Continue to monitor. Replenish w IV KCL and 

powder KCl as pt not tolerating the pills. May need to try the effervescent 

kind. 





N/V: she has po phenergan however if unable to take po may take IV.  





Abdominal pain:no complaints today. unsure what the etiology is.   LFTs wnl, 

lipase wnl, abdominal u/s/KUB w no acute process





GERD: on protonix BID





chronic diarrhea: on loperamide





migraine HA: will hold off on fioricet per psych recs. Continue tylenol. 

Consider giving her one time dose of sumatriptan if worsening.





supraventricular tachycardia/heart murmur: on atenolol





?seizure d/o: pt now telling me that she has a hx of sz d/o on phenobarbital 

60.8mg po TID when she didn't mention this to me before. Pt is not a very 

reliable historian, will check EEG. get neuro eval for recs


Discharge Planning


per primary team











Vani Medina MD Nov 8, 2017 17:26

## 2017-11-08 NOTE — HHI.PYPN
Subjective


Remarks


Patient seen for follow-up, chart reviewed.  Patient found lying on hospital 

bed calm and cooperative with interview. Patient states that she slept better, 

continues to feel depressed but denies any SI at this time.  Patient states 

having been visited by her boyfriend and had spoken about rehabilitation 

program for substance use which she agreed to.  Patient initially refused to 

start duloxetine but later agreed.  Patient noted to be perseverative on 

wanting to restart gabapentin.





Review of Systems


Except as stated in HPI:  all other systems reviewed are Neg





Mental Status Examination


Appearance:  Appropriate


Consciousness:  Alert


Orientation:  Person, Place, Date/Time


Motor Activity:  Normal gait


Speech:  Unremarkable


Language:  Adequate


Fund of Knowledge:  Adequate


Attention and Concentration:  Adequate


Memory:  Impaired (events surrounding the suicide attempt)


Mood:  Other ("still depressed")


Affect:  Other (restricted)


Thought Process & Associations:  Linear


Thought Content:  Preoccupations (with restarting gabapentin)


Hallucination Type:  Auditory (denies today)


Delusion Type:  None


Suicidal Ideation:  Yes (denies today)


Suicidal Plan:  No


Suicidal Intention:  No


Homicidal Ideation:  No


Homicidal Plan:  No


Homicidal Intention:  No


Insight:  Poor


Judgment:  Poor





Results


Labs











Test


  11/7/17


20:00 11/8/17


09:30


 


Urine Osmolality 317 MOSM/KG  


 


Urine Random Creatinine 69.0 MG/DL  


 


Urine Random Sodium 51 MEQ/L  


 


Blood Urea Nitrogen  6 MG/DL 


 


Creatinine  0.50 MG/DL 


 


Random Glucose  84 MG/DL 


 


Calcium Level  8.6 MG/DL 


 


Sodium Level  126 MEQ/L 


 


Potassium Level  2.9 MEQ/L 


 


Chloride Level  91 MEQ/L 


 


Carbon Dioxide Level  21.2 MEQ/L 


 


Anion Gap  14 MEQ/L 


 


Estimat Glomerular Filtration


Rate 


  135 ML/MIN 


 


 


Serum Osmolality  259 MOSM/KG 








Vitals/IOs





Vital Signs








  Date Time  Temp Pulse Resp B/P (MAP) Pulse Ox O2 Delivery O2 Flow Rate FiO2


 


11/8/17 09:31   16     


 


11/8/17 05:45 98.2 75  182/98 (126) 100   














Intake and Output   


 


 11/8/17 11/8/17 11/9/17





 08:00 16:00 00:00


 


Intake Total 240 ml 100 ml 


 


Balance 240 ml 100 ml 











Assessment & Plan


Problem List:  


(1) Adjustment disorder with depressed mood


ICD Codes:  F43.21 - Adjustment disorder with depressed mood


Assessment & Plan


Patient continued to endorsed depressed mood but denies suicidal ideation.  

Will not restart gabapentin as patient had abused this medication in the past.  

Continue to cross taper fluoxetine with duloxetine for depression.  Continue 

recommendations as per primary medical team. Discharge planning in progress.


Justification for Cont. Inpt.


At risk for further decompensation if at lower level of care.


Discharge Planning


Will return back to her boyfriend's residence once psychiatrically clear.











Jarett Alcaraz MD Nov 8, 2017 17:12

## 2017-11-08 NOTE — PD.TTN
Patient Problems


1. Discharge planning


2. Medication compliance


3. Knowledge deficit


4. Lack of coping skills





Progress Toward Goals


Provider Present:  Dr. ESMER Alcaraz


Provider Input:  


Patient medication is being adjusted to stop one medication and add


another antidepressant


Nurse(s) Present:  Funmilayo Veras, RN


Nurse(s) Input:  


Patient refused new medication, continues to ask for other medication; she


is eating meals. Patient lacks motivation


Psychiatric Counselors Present:  TONY Nunn


Psych Therapist Input:  


Counselor reviewed patient treatment plan and discussed goals, and plans.


Patient is encouraged to participate with group activities


Group Spec/RT/OT/BEGUM Present:  SHY Santos


Group Spec/RT/OT/BEGUM Input:  


Patient attends selected groups, will encouraged to participate with more


groups and activities





Documentation


Scribe:  TONY Nunn Sandra LMHC Nov 8, 2017 12:38

## 2017-11-09 VITALS
OXYGEN SATURATION: 98 % | HEART RATE: 78 BPM | RESPIRATION RATE: 18 BRPM | SYSTOLIC BLOOD PRESSURE: 173 MMHG | TEMPERATURE: 98.4 F | DIASTOLIC BLOOD PRESSURE: 80 MMHG

## 2017-11-09 VITALS
RESPIRATION RATE: 18 BRPM | DIASTOLIC BLOOD PRESSURE: 75 MMHG | TEMPERATURE: 97.8 F | HEART RATE: 77 BPM | OXYGEN SATURATION: 100 % | SYSTOLIC BLOOD PRESSURE: 132 MMHG

## 2017-11-09 LAB
ALP SERPL-CCNC: 115 U/L (ref 45–117)
ALT SERPL-CCNC: 17 U/L (ref 10–53)
ANION GAP SERPL CALC-SCNC: 10 MEQ/L (ref 5–15)
AST SERPL-CCNC: 4 U/L (ref 15–37)
BETA HCG QUANT: 2 MIU/ML (ref 0–5)
BILIRUB SERPL-MCNC: 0.3 MG/DL (ref 0.2–1)
BUN SERPL-MCNC: 7 MG/DL (ref 7–18)
CHLORIDE SERPL-SCNC: 98 MEQ/L (ref 98–107)
GFR SERPLBLD BASED ON 1.73 SQ M-ARVRAT: 129 ML/MIN (ref 89–?)
HCO3 BLD-SCNC: 22.8 MEQ/L (ref 21–32)
MAGNESIUM SERPL-MCNC: 1.8 MG/DL (ref 1.5–2.5)
PHENOBARB SERPL-MCNC: 7.3 MCG/ML (ref 15–40)
POTASSIUM SERPL-SCNC: 3.3 MEQ/L (ref 3.5–5.1)
SODIUM SERPL-SCNC: 131 MEQ/L (ref 136–145)
VIT B12 SERPL-MCNC: 1446 PG/ML (ref 193–986)

## 2017-11-09 RX ADMIN — ATENOLOL SCH MG: 50 TABLET ORAL at 20:40

## 2017-11-09 RX ADMIN — PANTOPRAZOLE SCH MG: 40 TABLET, DELAYED RELEASE ORAL at 20:40

## 2017-11-09 RX ADMIN — SODIUM CHLORIDE AND POTASSIUM CHLORIDE SCH MLS/HR: 9; 1.49 INJECTION, SOLUTION INTRAVENOUS at 06:14

## 2017-11-09 RX ADMIN — ATENOLOL SCH MG: 50 TABLET ORAL at 08:31

## 2017-11-09 RX ADMIN — PROMETHAZINE HYDROCHLORIDE PRN MG: 25 TABLET ORAL at 09:22

## 2017-11-09 RX ADMIN — DULOXETINE HYDROCHLORIDE SCH MG: 20 CAPSULE, DELAYED RELEASE ORAL at 20:40

## 2017-11-09 RX ADMIN — ACETAMINOPHEN PRN MG: 325 TABLET ORAL at 13:36

## 2017-11-09 RX ADMIN — FERROUS SULFATE TAB 325 MG (65 MG ELEMENTAL FE) SCH MG: 325 (65 FE) TAB at 12:57

## 2017-11-09 RX ADMIN — FERROUS SULFATE TAB 325 MG (65 MG ELEMENTAL FE) SCH MG: 325 (65 FE) TAB at 17:47

## 2017-11-09 RX ADMIN — ACETAMINOPHEN PRN MG: 325 TABLET ORAL at 21:18

## 2017-11-09 RX ADMIN — MIRTAZAPINE SCH MG: 15 TABLET, FILM COATED ORAL at 20:40

## 2017-11-09 RX ADMIN — PANTOPRAZOLE SCH MG: 40 TABLET, DELAYED RELEASE ORAL at 08:31

## 2017-11-09 RX ADMIN — MAGNESIUM SULFATE IN DEXTROSE SCH MLS/HR: 10 INJECTION, SOLUTION INTRAVENOUS at 16:08

## 2017-11-09 RX ADMIN — ACETAMINOPHEN PRN MG: 325 TABLET ORAL at 09:23

## 2017-11-09 RX ADMIN — TOPIRAMATE SCH MG: 25 TABLET, COATED ORAL at 13:36

## 2017-11-09 RX ADMIN — MAGNESIUM SULFATE IN DEXTROSE SCH MLS/HR: 10 INJECTION, SOLUTION INTRAVENOUS at 15:30

## 2017-11-09 RX ADMIN — BENZONATATE PRN MG: 100 CAPSULE ORAL at 13:35

## 2017-11-09 RX ADMIN — DULOXETINE HYDROCHLORIDE SCH MG: 20 CAPSULE, DELAYED RELEASE ORAL at 08:31

## 2017-11-09 RX ADMIN — TOPIRAMATE SCH MG: 25 TABLET, COATED ORAL at 20:40

## 2017-11-09 RX ADMIN — PROMETHAZINE HYDROCHLORIDE PRN MG: 25 TABLET ORAL at 21:18

## 2017-11-09 NOTE — RADRPT
EXAM DATE/TIME:  11/09/2017 14:59 

 

[facility] COMPARISON:     

No previous studies available for comparison.

       

 

 

INDICATIONS :     

***CVA. Headache and seizure activity.

                     

 

CONTRAST:     

17 cc Omniscan (gadodiamide) IV

                     

 

MEDICAL HISTORY :     

Hypertension. Seizures.   PTSD.

 

SURGICAL HISTORY :     

Hysterectomy. Appendectomy. Fusion, cervical. 

 

ENCOUNTER:     

Initial

 

ACUITY:     

1 day

 

PAIN SCORE:     

6/10

 

LOCATION:         

Head.

 

TECHNIQUE:     

Multiplanar, multisequence MRI of the brain was performed both prior to and following the administrat
ion of paramagnetic contrast.

 

FINDINGS:     

 

CEREBRUM:     

The ventricles are normal for age.  No evidence of midline shift, mass lesion, hemorrhage or acute in
farction.  No extraaxial fluid collections are seen.  The pituitary gland and suprasellar cistern are
 normal in configuration.

 

WHITE MATTER:     

No significant signal abnormalities are seen in the white matter.

 

POSTERIOR FOSSA:     

The cerebellum and brainstem are intact.  The 4th ventricle is midline. The cerebellopontine angle is
 unremarkable.  The cerebellar tonsils are normal in position.

 

DIFFUSION IMAGING:     

No focal areas of restricted diffusion are seen.  No evidence of acute infarction.

 

EXTRACRANIAL:     

The visualized portions of the orbits and paranasal sinuses are unremarkable.

 

POST-CONTRAST:     

No abnormal areas of parenchymal or dural enhancement.  No evidence of blood-brain barrier breakdown.


 

CONCLUSION:     

No acute disease, mass or edema.

 

Symmetric hippocampal complexes. 

 

 

 

 Shaun Hope MD on November 09, 2017 at 17:24           

Board Certified Radiologist.

 This report was verified electronically.

## 2017-11-09 NOTE — HHI.PR
Subjective


Remarks


Follow-up for hypokalemia





No overnight events, denies any chest pain or palpitations.  Did not receive 

fluids yesterday because of malfunctioning peripheral IV line





Objective


Vitals





Vital Signs








  Date Time  Temp Pulse Resp B/P (MAP) Pulse Ox O2 Delivery O2 Flow Rate FiO2


 


11/9/17 06:22 98.4 78 18 173/80 (111) 98   


 


11/8/17 18:00 97.2 80 18 144/85 (104) 99   














I/O      


 


 11/8/17 11/8/17 11/8/17 11/9/17 11/9/17 11/9/17





 07:00 15:00 23:00 07:00 15:00 23:00


 


Intake Total 240 ml  749 ml   


 


Balance 240 ml  749 ml   


 


      


 


Intake Oral 240 ml  240 ml   


 


IV Total   509 ml   


 


# Voids 2  4 5  


 


# Bowel Movements    0  








Result Diagram:  


11/8/17 0930





Objective Remarks


Not in distress, well-nourished, looks stated age


Normal rate and regular rhythm, no murmurs gallops or rubs appreciated. 


Clear to auscultation and symmetric bilaterally, normal respiratory effort.  


Normal bowel sounds, soft, non-tender, nondistended, no guarding. 


No edema


AAO x3, no cranial nerve deficits, moves all 4 extremities, no focal neurologic 

deficits





A/P


Assessment and Plan





borderline personality d/o, depression,  PTSD, atarax overdose: Management per 

psychiatry





HTN: uncontrolled. Pt currently on clonidine 0.2mg po TID and atenolol 50mg po 

BID. now on amlodipine 10mg po daily.  Increase hydralazine.  Prn vasotec and 

hydralazine available. monitor.  on a heart healthy diet





Hypokalemia: slowly correcting. Continue to monitor.  Continue IV potassium, 

will give another bolus and oral replacement, check magnesium.  Check BMP 

tomorrow





Hypomagnesemia-magnesium sulfate 2 g today.





N/V: she has po phenergan however if unable to take po may take IV.  





Abdominal pain:no complaints today. unsure what the etiology is.   LFTs wnl, 

lipase wnl, abdominal u/s/KUB w no acute process, abdominal pain resolved





GERD: on protonix BID





chronic diarrhea: on loperamide





migraine HA: will hold off on fioricet per psych recs. Continue tylenol.  

Neurology following, started on Topamax.





supraventricular tachycardia/heart murmur: on atenolol





?seizure d/o: pt now telling me that she has a hx of sz d/o on phenobarbital 

60.8mg po TID when she didn't mention this to me before. Pt is not a very 

reliable historian, follow-up EEG, neurology started Topamax.  Follow-up MRI.











Donato Padilla MD Nov 9, 2017 11:04

## 2017-11-09 NOTE — PD.PSY.CON
Provisional Diagnosis


Admission Date


Nov 6, 2017 at 13:51


Axis I.


1.  Adjustment disorder with depressed mood


Axis II.


Deferred





History of Present Illness


Service


Psychiatry


Consult Requested By


Dr. Alcaraz


Reason for Consult


Second opinion for involuntary psychiatric hospitalization


Primary Care Physician


Unknown


HPI


From Dr. Alcaraz's H&P: 


Patient is a 42-year-old  woman, , living with boyfriend, 

unemployed, with a past psychiatric history of self reported PTSD, depression 

and anxiety along with borderline personality disorder, with 1 previous 

psychiatric hospitalization, denies previous suicide attempts or self-injurious 

behavior, has outpatient follow-up at [facility] and recent regimen 

includes gabapentin 800 mg by mouth 3 times a day, Remeron 15 mg daily at 

bedtime and Prozac 40 mg by mouth daily, past medical history of migraine 

headaches, hypertension, A. fib, seizures who was admitted to the medical floor 

recently after reported overdose and had arrived with a GCS of 3 and 

subsequently intubated in the ER due to respiratory depression which after 

stabilization psychiatry was consulted for evaluation and transferred to the 

inpatient medical/psychiatry unit for further evaluation and management.


Patient was found lying in hospital bed, cooperative interview today with writer

, therapist and nurse.  Patient states that she "took a mixture of pills" was 

unable to recall events surrounding incident which occurred 3 days ago and 

states waking up in the hospital.  Patient states that she was aware she was 

brought in by EMS activated by her boyfriend after he had found her at the end 

of the stairwell states the last and she recalls was watching television and 

talking to someone on the phone.  Patient reports having had decreased sleep 

recently along with energy being "up and down", no change in appetite, decrease 

in concentration, feeling sad and depressed recently along with feelings of 

guilt about not having been a better mother to her children.  Patient states 

that she denies having had any suicide ideations and states that she does not 

believe in suicide as she believes she is "going to hell if I killed myself".  

Patient reports that her recent stressors recently have been the passing of her 

mother recently in February was also considered to be her best friend, 

financial difficulties away as well as feeling being far from her children.  

Patient states that currently she feels "not very good" denies any perceptual 

disturbances aside from the voice of her mother and grandmother that occurs 

after the passing of her mother.  Patient denies any paranoid delusions at this 

time.  When asked about what she was feeling or thinking about this recalling 

what happened she states "for that is that I felt tired of everything".  But 

did not continue to elaborate.


Collateral pending from patient's boyfriend, reports that "she likes going to 

hospitals", and that the patient complains about "anything just to get a 

prescription".  He mentions that What She Says Is Manipulation.  He Reports 

That She Has Been Abusing Her Medications for Years and That Her Medications of 

Choice Include Gabapentin, Clonazepam, and Phenobarbital.  He mentions that the 

patient had reported previous suicide attempt via overdose in the past to him.  

He reports that patient has no past cheeks her medications while in the 

hospital and kept them always take them later all at once so that she can get 

high.  He reports believing that this recent incident was a true suicide 

attempt as he states there found to be empty bottles of medications under the 

bed covers.





On my examination today:


Patient seen and examined with nurse.  Chart reviewed.  Case discussed with 

nursing staff.  On my examination today, the patient reports that she presented 

following an overdose on a mixture of medication.  She denies that this was 

suicidal in nature and says that she simply "took a wrong mix of pills."  She 

denies any suicidal or homicidal ideation now.  She denies any audiovisual 

hallucinations.  She does admit to depression following her mother's passing in 

February of this year but denies any feelings of hopelessness or worthlessness 

anymore.  No hypomanic or manic symptoms.  The remainder of the psychiatric ROS 

is negative.  No physical complaints.





Past psychiatric history: The patient reports a history of PTSD from childhood 

trauma and borderline personality disorder.  She follows psychiatrically or 

Mati Castellanos.  She reports that she was psychiatrically admitted a few 

months ago related to distress at her mother's passing.  She denies a history 

of previous suicide attempts.





Family history: Patient reports a family history of alcoholism.





Chemical dependency history: The patient reports a history of abuse of opiate 

pain medications.





Social history: The patient reports that she lives with her boyfriend of 1 

year.  She has 2 daughters and 2 grandchildren and reports that she maintains 

good contact with them.  She is high school educated.  She has a pending 

disability claim.  She denies any legal issues.  She is a [employer] 

[employer].





Review of Systems


Except as stated in HPI:  all other systems reviewed are Neg





Past Family Social History


Coded Allergies:  


     Sulfa (Sulfonamide Antibiotics) (Verified  Allergy, Severe, 11/6/17)


     azithromycin (Verified  Allergy, Severe, 11/6/17)


     ondansetron (Verified  Allergy, Severe, Swelling, 11/5/17)


     cyclobenzaprine (Verified  Allergy, Intermediate, 11/5/17)


     propoxyphene (Verified  Allergy, Intermediate, 11/5/17)


Past Medical History


See electronic medical record


Active Scripts


Ferrous Sulfate (Ferosul) 325 Mg (65 Mg Iron) Tablet, 325 MG PO BID@12,17 for 

iron deficiency anemia, #60 MG


   Prov:Bee Vo MD         11/6/17


Reported Medications


Atenolol (Atenolol) 50 Mg Tab, 50 MG PO BID for Blood Pressure Management, #60 

TAB 0 Refills


   11/5/17


Clonidine (Clonidine) 0.2 Mg Tab, 0.2 MG PO TID for Blood Pressure Management, #

60 TAB 0 Refills


   11/5/17


Loperamide (Loperamide) 2 Mg Cap, 2 MG PO AS DIRECTED Y for DIARRHEA, CAP 0 

Refills


   One capsule after each loose stool.


   Not to exceed 8 capsules per day.


   11/4/17


Pantoprazole (Pantoprazole) 40 Mg Tab, 40 MG PO BID for Reflux, #30 TAB 0 

Refills


   11/4/17


Gabapentin (Gabapentin) 600 Mg Tab, 600 MG PO QID, #60 TAB 0 Refills


   11/4/17


Fluoxetine (Fluoxetine) 40 Mg Cap, 40 CAP PO DAILY, #30 CAP 0 Refills


   11/4/17


Mirtazapine (Mirtazapine) 15 Mg Tab, 15 MG PO HS for Depression Control, #30 

TAB 0 Refills


   11/4/17


Discontinued Reported Medications


Hydroxyzine Pamoate (Hydroxyzine Pamoate) 50 Mg Cap, 50 MG PO BID, CAP 0 Refills


   11/4/17


Benztropine (Benztropine) 0.5 Mg Tab, 1 MG PO BID, #60 TAB 0 Refills


   11/4/17





Current Medications








 Medications


  (Trade)  Dose


 Ordered  Sig/Joyce


 Route  Start Time


 Stop Time Status Last Admin


 


  (Tenormin)  50 mg  BID


 PO  11/6/17 17:00


    11/9/17 08:31


 


 


  (Catapres)  0.2 mg  TID


 PO  11/6/17 18:00


    11/9/17 12:57


 


 


  (Ferrous Sulfate)  325 mg  BID@12,17


 PO  11/6/17 17:00


    11/9/17 12:57


 


 


  (Neurontin)  600 mg  QID


 PO  11/6/17 18:00


   Future Hold  


 


 


  (Imodium)  2 mg  Q8H  PRN


 PO  11/6/17 16:00


     


 


 


  (Remeron)  15 mg  HS


 PO  11/6/17 21:00


    11/8/17 20:56


 


 


  (Protonix)  40 mg  BID


 PO  11/6/17 21:00


    11/9/17 08:31


 


 


  (Ativan Inj)  1 mg  Q6H  PRN


 IM  11/6/17 15:00


    11/9/17 14:39


 


 


  (Tylenol)  650 mg  Q4H  PRN


 PO  11/6/17 16:00


    11/9/17 13:36


 


 


  (Milk Of


 Magnesia Liq)  30 ml  DAILY  PRN


 PO  11/6/17 16:00


     


 


 


  (Mag-Al Plus


 Susp Liq)  30 ml  Q6H  PRN


 PO  11/6/17 14:45


     


 


 


  (Vasotec Inj)  2.5 mg  Q6H  PRN


 IV PUSH  11/6/17 17:15


    11/6/17 23:45


 


 


  (Apresoline)  10 mg  Q6HR  PRN


 PO  11/6/17 17:15


    11/8/17 06:00


 


 


  (Phenergan)  25 mg  Q8H  PRN


 PO  11/6/17 19:15


    11/9/17 09:22


 


 


  (Norvasc)  10 mg  DAILY


 PO  11/7/17 09:00


    11/9/17 08:31


 


 


  (Phenergan Inj)  25 mg  Q6H  PRN


 IM  11/7/17 08:45


     


 


 


 Potassium


 Chloride/Sodium


 Chloride  1,000 ml @ 


 50 mls/hr  Q20H


 IV  11/7/17 14:30


    11/7/17 20:40


 


 


  (Cymbalta Dr)  20 mg  BID


 PO  11/7/17 21:00


    11/9/17 08:31


 


 


  (Tessalon)  100 mg  TID  PRN


 PO  11/8/17 15:45


    11/9/17 13:35


 


 


  ([place]alls [place]ago)  1 lozenge  UNSCH  PRN


 BUCCAL  11/8/17 17:00


    11/8/17 20:56


 


 


  (Topamax)  50 mg  Q12HR


 PO  11/9/17 09:00


    11/9/17 13:36


 


 


 Magnesium Sulfate/


 Dextrose  100 ml @ 


 100 mls/hr  Q1H


 IV  11/9/17 14:30


 11/9/17 16:29  11/9/17 16:08


 


 


 Potassium Chloride  100 ml @ 


 50 mls/hr  Q2H


 IV  11/9/17 14:30


 11/9/17 16:29  11/9/17 16:09


 


 


  (Apresoline)  25 mg  Q8HR


 PO  11/9/17 22:00


     


 








Family Psych History


See above


Social History


See above


Patient's Strengths (min. 2)


In a monitored setting.  Verbally fluent.





Physical Exam


Physical exam completed by hospitalist consultant.  On my examination today, 

the patient appears to be in no acute physical distress.  No motor 

abnormalities noted.  Labs and vitals reviewed:


Vital Signs





Vital Signs








  Date Time  Temp Pulse Resp B/P (MAP) Pulse Ox O2 Delivery O2 Flow Rate FiO2


 


11/9/17 06:22 98.4 78 18 173/80 (111) 98   














I/O   


 


 11/9/17 11/9/17 11/10/17





 08:00 16:00 00:00


 


Intake Total  120 ml 


 


Balance  120 ml 








Lab Results











Test


  11/9/17


12:25 11/9/17


12:27


 


Erythrocyte Sedimentation Rate  22 mm/hr 


 


Blood Urea Nitrogen  7 MG/DL 


 


Creatinine  0.52 MG/DL 


 


Random Glucose  92 MG/DL 


 


Total Protein  7.0 GM/DL 


 


Albumin  3.2 GM/DL 


 


Calcium Level  8.5 MG/DL 


 


Magnesium Level  1.8 MG/DL 


 


Alkaline Phosphatase  115 U/L 


 


Aspartate Amino Transf


(AST/SGOT) 


  4 U/L 


 


 


Alanine Aminotransferase


(ALT/SGPT) 


  17 U/L 


 


 


Total Bilirubin  0.3 MG/DL 


 


Sodium Level  131 MEQ/L 


 


Potassium Level  3.3 MEQ/L 


 


Chloride Level  98 MEQ/L 


 


Carbon Dioxide Level  22.8 MEQ/L 


 


Anion Gap  10 MEQ/L 


 


Estimat Glomerular Filtration


Rate 


  129 ML/MIN 


 


 


Vitamin B12 Level  1446 PG/ML 


 


Human Chorionic Gonadotropin,


Quant 


  2 MIU/ML 


 


 


Phenobarbital Level  7.3 MCG/ML 











Mental Status Examination


Appearance:  Appropriate


Consciousness:  Alert


Orientation:  Person, Place (at least)


Motor Activity:  Other (no motor abnormalities noted)


Speech:  Unremarkable


Language:  Adequate


Fund of Knowledge:  Adequate


Attention and Concentration:  Adequate


Mood:  Other (improving but still somewhat depressed)


Affect:  Appropriate (fairly full and reactive)


Thought Process & Associations:  Linear


Thought Content:  Appropriate


Hallucination Type:  None


Delusion Type:  None


Suicidal Ideation:  No


Suicidal Plan:  No


Suicidal Intention:  No


Homicidal Ideation:  No


Homicidal Plan:  No


Homicidal Intention:  No


Insight:  Poor


Judgment:  Poor





Assessment & Plan


Problem List:  


(1) Adjustment disorder with depressed mood


ICD Codes:  F43.21 - Adjustment disorder with depressed mood


Assessment & Plan


Given the circumstances of the patient's presentation here, her presentation on 

my examination today, and looking at the totality of the case, I concur with 

Dr. Alcaraz that the patient meets criteria for involuntary psychiatric 

hospitalization under the [place] act.  Concern for possible risk of harm to self 

in light of presenting overdose, which may have been suicidal in nature.  I 

have completed the second opinion paperwork.  Further care as per Dr. Alcaraz.  

Thank you very much for this consultation.  Signing off.


Discharge Planning


Per Brown Locke MD Nov 9, 2017 16:39
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
## 2017-11-09 NOTE — MG
cc:

JOSIAS FELICIANO

****

 

 

Lab No:  Date:  11/9/17 Age: 42      Sex:  F Race:

 

TECHNIQUE

This is a 17 channel EEG.

 

DESCRIPTION

The background rhythm reveals symmetrical alpha rhythm. Frequency 8 Hz,

amplitude 30 microvolts.  There is the expected anterior decrement to the

response.  There are no lateralizing features seen.  There are no epileptiform

discharges. During drowsiness there is some slowing in the theta range.

Hyperventilation was done with no change in the background rhythm. Photic

stimulation results in a normal driving response.

 

INTERPRETATION

Normal EEG.

 

 

 

                              _________________________________

                              Josias Feliciano MD

 

 

 

 

TAHIR/CM

D:  11/9/2017/3:03 PM

T:  11/9/2017/4:42 PM

Visit #:  [id number]

Job #:  [id number]

## 2017-11-09 NOTE — HHI.PYPN
Subjective


Remarks


Patient seen follow, chart review.  Patient found sitting on hospital bed, 

cooperative today.  States feeling much better, have visited with her boyfriend 

and feels that he is optimistic that she will continue to improve.  She states 

that boyfriend stated that she "sounded better".  She reports he drinking well 

the probable bowel movement.  Patient states that she feeling able to cope 

better with recent loss of her mother and states that she is "in a better place

".  Patient denies any suicide ideation at this time.





Review of Systems


Except as stated in HPI:  all other systems reviewed are Neg





Mental Status Examination


Appearance:  Appropriate


Consciousness:  Alert


Orientation:  Person, Place, Date/Time


Motor Activity:  Normal gait


Speech:  Unremarkable


Language:  Adequate


Fund of Knowledge:  Adequate


Attention and Concentration:  Adequate


Memory:  Impaired (events surrounding the suicide attempt)


Mood:  Other ("better")


Affect:  Other (restricted)


Thought Process & Associations:  Linear


Thought Content:  Appropriate


Hallucination Type:  Auditory (denies today)


Delusion Type:  None


Suicidal Ideation:  Yes (denies today)


Suicidal Plan:  No


Suicidal Intention:  No


Homicidal Ideation:  No


Homicidal Plan:  No


Homicidal Intention:  No


Insight:  Poor (improving)


Judgment:  Poor





Results


Labs


Labs reviewed.








Test


  11/9/17


12:25 11/9/17


12:27


 


Erythrocyte Sedimentation Rate  22 mm/hr 


 


Blood Urea Nitrogen  7 MG/DL 


 


Creatinine  0.52 MG/DL 


 


Random Glucose  92 MG/DL 


 


Total Protein  7.0 GM/DL 


 


Albumin  3.2 GM/DL 


 


Calcium Level  8.5 MG/DL 


 


Magnesium Level  1.8 MG/DL 


 


Alkaline Phosphatase  115 U/L 


 


Aspartate Amino Transf


(AST/SGOT) 


  4 U/L 


 


 


Alanine Aminotransferase


(ALT/SGPT) 


  17 U/L 


 


 


Total Bilirubin  0.3 MG/DL 


 


Sodium Level  131 MEQ/L 


 


Potassium Level  3.3 MEQ/L 


 


Chloride Level  98 MEQ/L 


 


Carbon Dioxide Level  22.8 MEQ/L 


 


Anion Gap  10 MEQ/L 


 


Estimat Glomerular Filtration


Rate 


  129 ML/MIN 


 


 


Vitamin B12 Level  1446 PG/ML 


 


Human Chorionic Gonadotropin,


Quant 


  2 MIU/ML 


 


 


Phenobarbital Level  7.3 MCG/ML 








Vitals/IOs





Vital Signs








  Date Time  Temp Pulse Resp B/P (MAP) Pulse Ox O2 Delivery O2 Flow Rate FiO2


 


11/9/17 06:22 98.4 78 18 173/80 (111) 98   














Intake and Output   


 


 11/9/17 11/9/17 11/10/17





 08:00 16:00 00:00


 


Intake Total  120 ml 


 


Balance  120 ml 











Assessment & Plan


Problem List:  


(1) Adjustment disorder with depressed mood


ICD Codes:  F43.21 - Adjustment disorder with depressed mood


Assessment & Plan


Patient seen follow-up, chart reviewed.  Patient noted to have improvement of 

mood today, still noted to have dysthymic affect, no longer endorses suicidal 

ideations.  We'll continue to cross titrate fluoxetine with duloxetine for 

depression.  Patient to continue recommendations as per primary medical team.  

Discharge planning in progress


Justification for Cont. Inpt.


At risk for further decompensation if at lower level of care


Discharge Planning


Patient's her back to boyfriend's residence once psychiatrically stable.











Jarett Alcaraz MD Nov 9, 2017 15:52

## 2017-11-10 VITALS
RESPIRATION RATE: 18 BRPM | SYSTOLIC BLOOD PRESSURE: 119 MMHG | HEART RATE: 85 BPM | TEMPERATURE: 98.2 F | DIASTOLIC BLOOD PRESSURE: 74 MMHG | OXYGEN SATURATION: 93 %

## 2017-11-10 RX ADMIN — ACETAMINOPHEN PRN MG: 325 TABLET ORAL at 13:37

## 2017-11-10 RX ADMIN — TOPIRAMATE SCH MG: 25 TABLET, COATED ORAL at 08:25

## 2017-11-10 RX ADMIN — DULOXETINE HYDROCHLORIDE SCH MG: 20 CAPSULE, DELAYED RELEASE ORAL at 08:25

## 2017-11-10 RX ADMIN — PANTOPRAZOLE SCH MG: 40 TABLET, DELAYED RELEASE ORAL at 08:25

## 2017-11-10 RX ADMIN — ACETAMINOPHEN PRN MG: 325 TABLET ORAL at 03:31

## 2017-11-10 RX ADMIN — FERROUS SULFATE TAB 325 MG (65 MG ELEMENTAL FE) SCH MG: 325 (65 FE) TAB at 12:00

## 2017-11-10 RX ADMIN — ACETAMINOPHEN PRN MG: 325 TABLET ORAL at 08:26

## 2017-11-10 RX ADMIN — SODIUM CHLORIDE AND POTASSIUM CHLORIDE SCH MLS/HR: 9; 1.49 INJECTION, SOLUTION INTRAVENOUS at 02:30

## 2017-11-10 RX ADMIN — ATENOLOL SCH MG: 50 TABLET ORAL at 08:25

## 2017-11-10 RX ADMIN — PROMETHAZINE HYDROCHLORIDE PRN MG: 25 TABLET ORAL at 08:25

## 2017-11-10 NOTE — HHI.PR
Subjective


Remarks


In bed, appears in nad. Pleasant. Denies chest pain or sob. No n/v/d/c. Denies 

fever or chills. Patient says she is eating better,  appetite is improved. No 

nausea or vomiting, no diarrhea.





Objective


Vitals





Vital Signs








  Date Time  Temp Pulse Resp B/P (MAP) Pulse Ox O2 Delivery O2 Flow Rate FiO2


 


11/10/17 06:13 98.2 85 18 119/74 (89) 93   


 


11/9/17 18:00 97.8 77 18 132/75 (94) 100   














I/O      


 


 11/9/17 11/9/17 11/9/17 11/10/17 11/10/17 11/10/17





 07:00 15:00 23:00 07:00 15:00 23:00


 


Intake Total  120 ml 960 ml 240 ml  


 


Balance  120 ml 960 ml 240 ml  


 


      


 


Intake Oral  120 ml 960 ml 240 ml  


 


# Voids 5  1 1  


 


# Bowel Movements 0     








Result Diagram:  


11/9/17 1227





Imaging





Last Impressions








Brain MRI 11/9/17 0858 Signed





Impressions: 





 Service Date/Time:  Thursday, November 9, 2017 14:59 - CONCLUSION:  No acute 





 disease, mass or edema.  Symmetric hippocampal complexes.      Shaun Hope MD 


 


Abdomen X-Ray 11/7/17 0000 Signed





Impressions: 





 Service Date/Time:  Tuesday, November 7, 2017 14:13 - CONCLUSION:  Nonspecific

, 





 benign abdomen appearance.     Dash Mcguire MD 


 


Abdomen Ultrasound 11/7/17 0000 Signed





Impressions: 





 Service Date/Time:  Tuesday, November 7, 2017 09:27 - CONCLUSION:  No acute 





 finding is identified. The pancreas is not adequately visualized secondary to 





 bowel gas.     Dash Ramirez MD 








Objective Remarks


GENERAL: 41 yo male, appears in nad. 


CARDIOVASCULAR: Regular rate and rhythm.  


RESPIRATORY: No accessory muscle use. Clear to auscultation. Breath sounds 

equal bilaterally. 


GASTROINTESTINAL: Abdomen soft, non-tender, nondistended. Hepatic and splenic 

margins not palpable. 


MUSCULOSKELETAL: Extremities without clubbing, cyanosis, or edema. No obvious 

deformities. 


NEUROLOGICAL: Awake and alert. No obvious cranial nerve deficits.  Motor 

grossly within normal limits. Five out of 5 muscle strength in the arms and 

legs.  Normal speech.


PSYCHIATRIC: Appropriate mood and affect; insight and judgment normal.











Bee Vo MD Nov 10, 2017 09:01

## 2017-11-10 NOTE — HHI.PR
Subjective


Remarks


In bed, appears in nad. Pleasant. Denies chest pain or sob. No n/v/d/c. Denies 

fever or chills. Patient says she is eating better,  appetite is improved. No 

nausea or vomiting, no diarrhea.





Objective


Vitals





Vital Signs








  Date Time  Temp Pulse Resp B/P (MAP) Pulse Ox O2 Delivery O2 Flow Rate FiO2


 


11/10/17 06:13 98.2 85 18 119/74 (89) 93   


 


11/9/17 18:00 97.8 77 18 132/75 (94) 100   














I/O      


 


 11/9/17 11/9/17 11/9/17 11/10/17 11/10/17 11/10/17





 07:00 15:00 23:00 07:00 15:00 23:00


 


Intake Total  120 ml 960 ml 240 ml  


 


Balance  120 ml 960 ml 240 ml  


 


      


 


Intake Oral  120 ml 960 ml 240 ml  


 


# Voids 5  1 1  


 


# Bowel Movements 0     








Result Diagram:  


11/9/17 1227





Imaging





Last Impressions








Brain MRI 11/9/17 0858 Signed





Impressions: 





 Service Date/Time:  Thursday, November 9, 2017 14:59 - CONCLUSION:  No acute 





 disease, mass or edema.  Symmetric hippocampal complexes.      Shaun Hope MD 


 


Abdomen X-Ray 11/7/17 0000 Signed





Impressions: 





 Service Date/Time:  Tuesday, November 7, 2017 14:13 - CONCLUSION:  Nonspecific

, 





 benign abdomen appearance.     Dash Mcguire MD 


 


Abdomen Ultrasound 11/7/17 0000 Signed





Impressions: 





 Service Date/Time:  Tuesday, November 7, 2017 09:27 - CONCLUSION:  No acute 





 finding is identified. The pancreas is not adequately visualized secondary to 





 bowel gas.     Dash Ramirez MD 








Objective Remarks


GENERAL: 41 yo male, appears in nad. 


CARDIOVASCULAR: Regular rate and rhythm.  


RESPIRATORY: No accessory muscle use. Clear to auscultation. Breath sounds 

equal bilaterally. 


GASTROINTESTINAL: Abdomen soft, non-tender, nondistended. Hepatic and splenic 

margins not palpable. 


MUSCULOSKELETAL: Extremities without clubbing, cyanosis, or edema. No obvious 

deformities. 


NEUROLOGICAL: Awake and alert. No obvious cranial nerve deficits.  Motor 

grossly within normal limits. Five out of 5 muscle strength in the arms and 

legs.  Normal speech.


PSYCHIATRIC: Appropriate mood and affect; insight and judgment normal.





A/P


Assessment and Plan


Borderline personality disorder, depression,  PTSD, atarax overdose: Management 

per psychiatry





HTN: uncontrolled. Pt currently on clonidine 0.2mg po TID and atenolol 50mg po 

BID. now on amlodipine 10mg po daily.  Increase hydralazine.  PRN vasotec and 

hydralazine available. monitor.  on a heart healthy diet





Hypokalemia: slowly correcting. Continue to monitor.  Continue IV potassium, 

will give another bolus and oral replacement, check magnesium.  Monitor and 

replace as need. Patient is also eating better and n/v/d resolved. 





Hypomagnesemia- s/p magnesium sulfate 2 g . Monitor and replace as indicated. 





N/V: she has po phenergan however if unable to take po may take IV.  





Abdominal pain: no complaints today. unsure what the etiology is.   LFTs wnl, 

lipase wnl, abdominal u/s/KUB w no acute process, abdominal pain resolved





GERD: on protonix BID





Chronic diarrhea: on loperamide. Diarrhea resolved. now. 





Migraine HA: will hold off on fioricet per psych recs. Continue tylenol.  

Neurology following, started on Topamax.





Supraventricular tachycardia/heart murmur: on atenolol





Possible seizure disorder: pt now telling me that she has a hx of sz d/o on 

phenobarbital 60.8mg po TID when she didn't mention this to me before. Pt is 

not a very reliable historian, follow-up EEG, neurology started Topamax.  Follow

-up MRI.








Discussed with the patient, nurse, Dr Alcaraz psychiatry











Bee Vo MD Nov 10, 2017 09:03

## 2017-11-10 NOTE — HHI.DS
Psychiatry Discharge Summary


Inpatient Psychiatric care?:  Yes


Advance Directive:  No


Reason Not Provided:  education provided


Mental Health AdvanceDirective:  No


Health Care Proxy:  No


Admission


Admission Date


Nov 6, 2017 at 13:51


Admission Diagnosis:  


(1) Adjustment disorder with depressed mood


ICD Code:  F43.21 - Adjustment disorder with depressed mood


Brief History


From Dr. Alcaraz's H&P: 


Patient is a 42-year-old  woman, , living with boyfriend, 

unemployed, with a past psychiatric history of self reported PTSD, depression 

and anxiety along with borderline personality disorder, with 1 previous 

psychiatric hospitalization, denies previous suicide attempts or self-injurious 

behavior, has outpatient follow-up at [facility] and recent regimen 

includes gabapentin 800 mg by mouth 3 times a day, Remeron 15 mg daily at 

bedtime and Prozac 40 mg by mouth daily, past medical history of migraine 

headaches, hypertension, A. fib, seizures who was admitted to the medical floor 

recently after reported overdose and had arrived with a GCS of 3 and 

subsequently intubated in the ER due to respiratory depression which after 

stabilization psychiatry was consulted for evaluation and transferred to the 

inpatient medical/psychiatry unit for further evaluation and management.


Patient was found lying in hospital bed, cooperative interview today with writer

, therapist and nurse.  Patient states that she "took a mixture of pills" was 

unable to recall events surrounding incident which occurred 3 days ago and 

states waking up in the hospital.  Patient states that she was aware she was 

brought in by EMS activated by her boyfriend after he had found her at the end 

of the stairwell states the last and she recalls was watching television and 

talking to someone on the phone.  Patient reports having had decreased sleep 

recently along with energy being "up and down", no change in appetite, decrease 

in concentration, feeling sad and depressed recently along with feelings of 

guilt about not having been a better mother to her children.  Patient states 

that she denies having had any suicide ideations and states that she does not 

believe in suicide as she believes she is "going to hell if I killed myself".  

Patient reports that her recent stressors recently have been the passing of her 

mother recently in February was also considered to be her best friend, 

financial difficulties away as well as feeling being far from her children.  

Patient states that currently she feels "not very good" denies any perceptual 

disturbances aside from the voice of her mother and grandmother that occurs 

after the passing of her mother.  Patient denies any paranoid delusions at this 

time.  When asked about what she was feeling or thinking about this recalling 

what happened she states "for that is that I felt tired of everything".  But 

did not continue to elaborate.


Collateral pending from patient's boyfriend, reports that "she likes going to 

hospitals", and that the patient complains about "anything just to get a 

prescription".  He mentions that What She Says Is Manipulation.  He Reports 

That She Has Been Abusing Her Medications for Years and That Her Medications of 

Choice Include Gabapentin, Clonazepam, and Phenobarbital.  He mentions that the 

patient had reported previous suicide attempt via overdose in the past to him.  

He reports that patient has no past cheeks her medications while in the 

hospital and kept them always take them later all at once so that she can get 

high.  He reports believing that this recent incident was a true suicide 

attempt as he states there found to be empty bottles of medications under the 

bed covers.





On my examination today:


Patient seen and examined with nurse.  Chart reviewed.  Case discussed with 

nursing staff.  On my examination today, the patient reports that she presented 

following an overdose on a mixture of medication.  She denies that this was 

suicidal in nature and says that she simply "took a wrong mix of pills."  She 

denies any suicidal or homicidal ideation now.  She denies any audiovisual 

hallucinations.  She does admit to depression following her mother's passing in 

February of this year but denies any feelings of hopelessness or worthlessness 

anymore.  No hypomanic or manic symptoms.  The remainder of the psychiatric ROS 

is negative.  No physical complaints.





Past psychiatric history: The patient reports a history of PTSD from childhood 

trauma and borderline personality disorder.  She follows psychiatrically or 

[facility].  She reports that she was psychiatrically admitted a few 

months ago related to distress at her mother's passing.  She denies a history 

of previous suicide attempts.





Family history: Patient reports a family history of alcoholism.





Chemical dependency history: The patient reports a history of abuse of opiate 

pain medications.





Social history: The patient reports that she lives with her boyfriend of 1 

year.  She has 2 daughters and 2 grandchildren and reports that she maintains 

good contact with them.  She is high school educated.  She has a pending 

disability claim.  She denies any legal issues.  She is a [employer] 

[employer].


Tobacco Use In Past 30 Days:  5 or More Cigarettes/Day


Alcohol Use:  Never


Hospital Course


Patient is a 42-year-old  woman, , living with boyfriend, 

unemployed, with a past psychiatric history of self reported PTSD, depression 

and anxiety along with borderline personality disorder, with 1 previous 

psychiatric hospitalization, denies previous suicide attempts or self-injurious 

behavior, has outpatient follow-up at [facility] and recent regimen 

includes gabapentin 800 mg by mouth 3 times a day, Remeron 15 mg daily at 

bedtime and Prozac 40 mg by mouth daily, past medical history of migraine 

headaches, hypertension, A. fib, seizures who was admitted to the medical floor 

recently after reported overdose and had arrived with a GCS of 3 and 

subsequently intubated in the ER due to respiratory depression which after 

stabilization psychiatry was consulted for evaluation and transferred to the 

inpatient medical/psychiatry unit for further evaluation and management.


Patent was cross tapered from fluoxetine to duloxetine 20mg PO BID, continued 

on mirtazapine 15mg PO HS. Patient tolerated medications well and observed mood 

and behavior during admission and was noted to have more stable mood, 

improvement of depression, no longer endorsing suicidal ideations and denied 

any visual hallucinations or delusions. Patient denied having suicidal or 

homicidal ideations, nor perceptual disturbances or delusions; calm and 

cooperative with staff. Upon discharge, patient denied any SI, HI, AVH or 

delusions, noted to be motivated with abstaining from abusing her medications 

and agreed to engage in rehabiliation program for substance use as well as to 

comply with treatment and outpatient follow up for continuity of care. He was 

discharged back to her [facility] residence who agreed to provide support and 

ensure patient continues plan.  Supportive psychotherapy provided. Patient 

advised to call 911 or go to nearest ED in case of emergency. Patient agree 

with plan.





Results


Blood Pressure


119 / 74





Vital Signs








  Date Time  Temp Pulse Resp B/P (MAP) Pulse Ox O2 Delivery O2 Flow Rate FiO2


 


11/10/17 06:13 98.2 85 18 119/74 (89) 93   











Laboratory Tests








Test


  11/7/17


20:00 11/8/17


09:30 11/9/17


12:25 11/9/17


12:27


 


Blood Urea Nitrogen  6 MG/DL (7-18)   


 


Sodium Level


  


  126 MEQ/L


(136-145) 


  131 MEQ/L


(136-145)


 


Potassium Level


  


  2.9 MEQ/L


(3.5-5.1) 


  3.3 MEQ/L


(3.5-5.1)


 


Chloride Level


  


  91 MEQ/L


() 


  


 


 


Serum Osmolality


  


  259 MOSM/KG


(275-295) 


  


 


 


Erythrocyte Sedimentation Rate


  


  


  


  22 mm/hr


(0-20)


 


Albumin


  


  


  


  3.2 GM/DL


(3.4-5.0)


 


Aspartate Amino Transf


(AST/SGOT) 


  


  


  4 U/L (15-37) 


 


 


Vitamin B12 Level


  


  


  


  1446 PG/ML


(193-986)


 


Phenobarbital Level


  


  


  


  7.3 MCG/ML


(15.0-40.0)








Summary of Procedures


None


Imaging





Last Impressions








Brain MRI 11/9/17 0858 Signed





Impressions: 





 Service Date/Time:  Thursday, November 9, 2017 14:59 - CONCLUSION:  No acute 





 disease, mass or edema.  Symmetric hippocampal complexes.      Shaun Hope MD 


 


Abdomen X-Ray 11/7/17 0000 Signed





Impressions: 





 Service Date/Time:  Tuesday, November 7, 2017 14:13 - CONCLUSION:  Nonspecific

, 





 benign abdomen appearance.     Dash Mcguire MD 


 


Abdomen Ultrasound 11/7/17 0000 Signed





Impressions: 





 Service Date/Time:  Tuesday, November 7, 2017 09:27 - CONCLUSION:  No acute 





 finding is identified. The pancreas is not adequately visualized secondary to 





 bowel gas.     Dash Ramirez MD 








Pending results at discharge:  No





Medications


# of Antipsychotic meds at D/C:  0


Approp Antipsych med options


1 - Minimum of three failed multiple trials of monotherapy.


2 - Documented plan to taper to monotherapy due to previous use of multiple 

meds OR cross-taper in progress at D/C.


3 - Documentation of augmentation of Clozapine.


4 - Justification other than those listed in allowable values 1-3, document here

:





Discharge


Discharge Date:  Nov 10, 2017


Discharge Diagnosis:  


(1) Adjustment disorder with depressed mood


ICD Code:  F43.21 - Adjustment disorder with depressed mood


Pt Condition on Discharge:  Stable


Discharge Disposition:  Discharge Home





Discharge Instructions


Diet Instructions:  Heart Healthy Diet


Activities you can perform:  Regular-No Restrictions





Discharge Time


> 30 minutes





Mental Status Examination


Appearance:  Appropriate


Consciousness:  Alert


Orientation:  Person, Place (at least), Date/Time


Motor Activity:  Normal gait


Speech:  Unremarkable


Language:  Adequate


Fund of Knowledge:  Adequate


Attention and Concentration:  Adequate


Memory:  Unremarkable


Mood:  Appropriate


Affect:  Appropriate (fairly full and reactive)


Thought Process & Associations:  Intact, Goal directed, Linear


Thought Content:  Appropriate


Hallucination Type:  None


Delusion Type:  None


Suicidal Ideation:  No


Suicidal Plan:  No


Suicidal Intention:  No


Homicidal Ideation:  No


Homicidal Plan:  No


Homicidal Intention:  No


Insight:  Fair


Judgment:  Impulsive





Discharge/Advance Care Plan


Health Problems:  


(1) Adjustment disorder with depressed mood


Goals to promote your health


* To prevent worsening of your condition and complications


* To maintain your health at the optimal level


Directions to meet your goals


*** Take your medications as prescribed


***  Follow your dietary instruction


***  Follow activity as directed





***  Keep your appointments as scheduled


***  Take your immunizations and boosters as scheduled


***  If your symptoms worsen call your PCP, if no PCP go to Urgent Care Center 

or Emergency Room ***


***  For 24/7 questions related to your inpatient stay or results of tests 

pending at discharge, please contact Dr. Jarett Alcaraz at [phone]


***  Smoking is Dangerous to Your Health. Avoid second hand smoking ***











Jarett Alcaraz MD Nov 10, 2017 15:11

## 2017-12-18 ENCOUNTER — HOSPITAL ENCOUNTER (OUTPATIENT)
Dept: HOSPITAL 17 - CLAB | Age: 42
End: 2017-12-18
Payer: SELF-PAY

## 2017-12-18 DIAGNOSIS — I10: Primary | ICD-10-CM

## 2017-12-18 LAB
ALP SERPL-CCNC: 108 U/L (ref 45–117)
ALT SERPL-CCNC: 12 U/L (ref 10–53)
ANION GAP SERPL CALC-SCNC: 7 MEQ/L (ref 5–15)
AST SERPL-CCNC: 14 U/L (ref 15–37)
BASOPHILS # BLD AUTO: 0.1 TH/MM3 (ref 0–0.2)
BASOPHILS NFR BLD: 1.2 % (ref 0–2)
BILIRUB SERPL-MCNC: 0.1 MG/DL (ref 0.2–1)
BUN SERPL-MCNC: 10 MG/DL (ref 7–18)
CHLORIDE SERPL-SCNC: 107 MEQ/L (ref 98–107)
EOSINOPHIL # BLD: 0.4 TH/MM3 (ref 0–0.4)
EOSINOPHIL NFR BLD: 5.6 % (ref 0–4)
ERYTHROCYTE [DISTWIDTH] IN BLOOD BY AUTOMATED COUNT: 21.8 % (ref 11.6–17.2)
GFR SERPLBLD BASED ON 1.73 SQ M-ARVRAT: 70 ML/MIN (ref 89–?)
HCO3 BLD-SCNC: 22.5 MEQ/L (ref 21–32)
HCT VFR BLD CALC: 29.3 % (ref 35–46)
HDLC SERPL-MCNC: 84.9 MG/DL (ref 40–60)
HEMO FLAGS: (no result)
LDLC SERPL-MCNC: 99 MG/DL (ref 0–99)
LYMPHOCYTES # BLD AUTO: 1.6 TH/MM3 (ref 1–4.8)
LYMPHOCYTES NFR BLD AUTO: 22.6 % (ref 9–44)
MCH RBC QN AUTO: 24.4 PG (ref 27–34)
MCHC RBC AUTO-ENTMCNC: 30.6 % (ref 32–36)
MCV RBC AUTO: 79.7 FL (ref 80–100)
MONOCYTES NFR BLD: 4.5 % (ref 0–8)
NEUTROPHILS # BLD AUTO: 4.6 TH/MM3 (ref 1.8–7.7)
NEUTROPHILS NFR BLD AUTO: 66.1 % (ref 16–70)
PLATELET # BLD: 436 TH/MM3 (ref 150–450)
POTASSIUM SERPL-SCNC: 4.3 MEQ/L (ref 3.5–5.1)
RBC # BLD AUTO: 3.68 MIL/MM3 (ref 4–5.3)
SODIUM SERPL-SCNC: 136 MEQ/L (ref 136–145)
WBC # BLD AUTO: 6.9 TH/MM3 (ref 4–11)

## 2017-12-18 PROCEDURE — 84443 ASSAY THYROID STIM HORMONE: CPT

## 2017-12-18 PROCEDURE — 80061 LIPID PANEL: CPT

## 2017-12-18 PROCEDURE — 36415 COLL VENOUS BLD VENIPUNCTURE: CPT

## 2017-12-18 PROCEDURE — 85025 COMPLETE CBC W/AUTO DIFF WBC: CPT

## 2017-12-18 PROCEDURE — 80053 COMPREHEN METABOLIC PANEL: CPT

## 2018-01-12 ENCOUNTER — HOSPITAL ENCOUNTER (INPATIENT)
Dept: HOSPITAL 17 - NEPC | Age: 43
LOS: 6 days | Discharge: LEFT BEFORE DISCHARGE | DRG: 377 | End: 2018-01-18
Attending: HOSPITALIST | Admitting: HOSPITALIST
Payer: SELF-PAY

## 2018-01-12 VITALS
SYSTOLIC BLOOD PRESSURE: 146 MMHG | RESPIRATION RATE: 14 BRPM | OXYGEN SATURATION: 96 % | HEART RATE: 69 BPM | TEMPERATURE: 98.6 F | DIASTOLIC BLOOD PRESSURE: 90 MMHG

## 2018-01-12 VITALS — WEIGHT: 195.42 LBS | HEIGHT: 64 IN | BODY MASS INDEX: 33.36 KG/M2

## 2018-01-12 DIAGNOSIS — R55: ICD-10-CM

## 2018-01-12 DIAGNOSIS — G92: ICD-10-CM

## 2018-01-12 DIAGNOSIS — M54.2: ICD-10-CM

## 2018-01-12 DIAGNOSIS — R13.10: ICD-10-CM

## 2018-01-12 DIAGNOSIS — F17.210: ICD-10-CM

## 2018-01-12 DIAGNOSIS — D50.0: ICD-10-CM

## 2018-01-12 DIAGNOSIS — J44.9: ICD-10-CM

## 2018-01-12 DIAGNOSIS — K25.9: ICD-10-CM

## 2018-01-12 DIAGNOSIS — G40.909: ICD-10-CM

## 2018-01-12 DIAGNOSIS — E86.0: ICD-10-CM

## 2018-01-12 DIAGNOSIS — K44.9: ICD-10-CM

## 2018-01-12 DIAGNOSIS — F41.9: ICD-10-CM

## 2018-01-12 DIAGNOSIS — M19.90: ICD-10-CM

## 2018-01-12 DIAGNOSIS — K92.1: Primary | ICD-10-CM

## 2018-01-12 DIAGNOSIS — Z88.2: ICD-10-CM

## 2018-01-12 DIAGNOSIS — Z90.710: ICD-10-CM

## 2018-01-12 DIAGNOSIS — F32.9: ICD-10-CM

## 2018-01-12 DIAGNOSIS — K21.9: ICD-10-CM

## 2018-01-12 DIAGNOSIS — Z79.899: ICD-10-CM

## 2018-01-12 DIAGNOSIS — K31.84: ICD-10-CM

## 2018-01-12 DIAGNOSIS — E66.9: ICD-10-CM

## 2018-01-12 LAB
ALBUMIN SERPL-MCNC: 3.8 GM/DL (ref 3.4–5)
ALP SERPL-CCNC: 100 U/L (ref 45–117)
ALT SERPL-CCNC: 15 U/L (ref 10–53)
AMORPHOUS SEDIMENT, URINE: (no result)
AST SERPL-CCNC: 16 U/L (ref 15–37)
BACTERIA #/AREA URNS HPF: (no result) /HPF
BASOPHILS # BLD AUTO: 0.1 TH/MM3 (ref 0–0.2)
BASOPHILS NFR BLD: 1.1 % (ref 0–2)
BILIRUB SERPL-MCNC: 0.2 MG/DL (ref 0.2–1)
BUN SERPL-MCNC: 10 MG/DL (ref 7–18)
CALCIUM SERPL-MCNC: 8.9 MG/DL (ref 8.5–10.1)
CHLORIDE SERPL-SCNC: 109 MEQ/L (ref 98–107)
COLOR UR: (no result)
CREAT SERPL-MCNC: 0.86 MG/DL (ref 0.5–1)
EOSINOPHIL # BLD: 0.2 TH/MM3 (ref 0–0.4)
EOSINOPHIL NFR BLD: 4.4 % (ref 0–4)
ERYTHROCYTE [DISTWIDTH] IN BLOOD BY AUTOMATED COUNT: 20 % (ref 11.6–17.2)
GFR SERPLBLD BASED ON 1.73 SQ M-ARVRAT: 72 ML/MIN (ref 89–?)
GLUCOSE SERPL-MCNC: 81 MG/DL (ref 74–106)
GLUCOSE UR STRIP-MCNC: (no result) MG/DL
HCO3 BLD-SCNC: 18.8 MEQ/L (ref 21–32)
HCT VFR BLD CALC: 25.8 % (ref 35–46)
HGB BLD-MCNC: 8 GM/DL (ref 11.6–15.3)
HGB UR QL STRIP: (no result)
INR PPP: 1.1 RATIO
KETONES UR STRIP-MCNC: (no result) MG/DL
LYMPHOCYTES # BLD AUTO: 1.6 TH/MM3 (ref 1–4.8)
LYMPHOCYTES NFR BLD AUTO: 34.4 % (ref 9–44)
MAGNESIUM SERPL-MCNC: 2 MG/DL (ref 1.5–2.5)
MCH RBC QN AUTO: 25.1 PG (ref 27–34)
MCHC RBC AUTO-ENTMCNC: 31 % (ref 32–36)
MCV RBC AUTO: 81.1 FL (ref 80–100)
MONOCYTE #: 0.3 TH/MM3 (ref 0–0.9)
MONOCYTES NFR BLD: 5.8 % (ref 0–8)
NEUTROPHILS # BLD AUTO: 2.5 TH/MM3 (ref 1.8–7.7)
NEUTROPHILS NFR BLD AUTO: 54.3 % (ref 16–70)
NITRITE UR QL STRIP: (no result)
PLATELET # BLD: 307 TH/MM3 (ref 150–450)
PMV BLD AUTO: 6.8 FL (ref 7–11)
PROT SERPL-MCNC: 7.5 GM/DL (ref 6.4–8.2)
PROTHROMBIN TIME: 10.7 SEC (ref 9.8–11.6)
RBC # BLD AUTO: 3.19 MIL/MM3 (ref 4–5.3)
SODIUM SERPL-SCNC: 138 MEQ/L (ref 136–145)
SP GR UR STRIP: 1 (ref 1–1.03)
SQUAMOUS #/AREA URNS HPF: 5 /HPF (ref 0–5)
TROPONIN I SERPL-MCNC: (no result) NG/ML (ref 0.02–0.05)
URINE LEUKOCYTE ESTERASE: (no result)
WBC # BLD AUTO: 4.6 TH/MM3 (ref 4–11)

## 2018-01-12 PROCEDURE — 82272 OCCULT BLD FECES 1-3 TESTS: CPT

## 2018-01-12 PROCEDURE — 82552 ASSAY OF CPK IN BLOOD: CPT

## 2018-01-12 PROCEDURE — 36430 TRANSFUSION BLD/BLD COMPNT: CPT

## 2018-01-12 PROCEDURE — 93005 ELECTROCARDIOGRAM TRACING: CPT

## 2018-01-12 PROCEDURE — 96372 THER/PROPH/DIAG INJ SC/IM: CPT

## 2018-01-12 PROCEDURE — 87804 INFLUENZA ASSAY W/OPTIC: CPT

## 2018-01-12 PROCEDURE — 88305 TISSUE EXAM BY PATHOLOGIST: CPT

## 2018-01-12 PROCEDURE — 76700 US EXAM ABDOM COMPLETE: CPT

## 2018-01-12 PROCEDURE — A9541 TC99M SULFUR COLLOID: HCPCS

## 2018-01-12 PROCEDURE — 96361 HYDRATE IV INFUSION ADD-ON: CPT

## 2018-01-12 PROCEDURE — 72125 CT NECK SPINE W/O DYE: CPT

## 2018-01-12 PROCEDURE — 36556 INSERT NON-TUNNEL CV CATH: CPT

## 2018-01-12 PROCEDURE — 84484 ASSAY OF TROPONIN QUANT: CPT

## 2018-01-12 PROCEDURE — 81001 URINALYSIS AUTO W/SCOPE: CPT

## 2018-01-12 PROCEDURE — 85025 COMPLETE CBC W/AUTO DIFF WBC: CPT

## 2018-01-12 PROCEDURE — 86920 COMPATIBILITY TEST SPIN: CPT

## 2018-01-12 PROCEDURE — 83735 ASSAY OF MAGNESIUM: CPT

## 2018-01-12 PROCEDURE — 78264 GASTRIC EMPTYING IMG STUDY: CPT

## 2018-01-12 PROCEDURE — 85018 HEMOGLOBIN: CPT

## 2018-01-12 PROCEDURE — 84100 ASSAY OF PHOSPHORUS: CPT

## 2018-01-12 PROCEDURE — P9016 RBC LEUKOCYTES REDUCED: HCPCS

## 2018-01-12 PROCEDURE — 82550 ASSAY OF CK (CPK): CPT

## 2018-01-12 PROCEDURE — 85027 COMPLETE CBC AUTOMATED: CPT

## 2018-01-12 PROCEDURE — C9113 INJ PANTOPRAZOLE SODIUM, VIA: HCPCS

## 2018-01-12 PROCEDURE — 86850 RBC ANTIBODY SCREEN: CPT

## 2018-01-12 PROCEDURE — 85014 HEMATOCRIT: CPT

## 2018-01-12 PROCEDURE — 86900 BLOOD TYPING SEROLOGIC ABO: CPT

## 2018-01-12 PROCEDURE — 85730 THROMBOPLASTIN TIME PARTIAL: CPT

## 2018-01-12 PROCEDURE — 86901 BLOOD TYPING SEROLOGIC RH(D): CPT

## 2018-01-12 PROCEDURE — 80053 COMPREHEN METABOLIC PANEL: CPT

## 2018-01-12 PROCEDURE — 96374 THER/PROPH/DIAG INJ IV PUSH: CPT

## 2018-01-12 PROCEDURE — 70450 CT HEAD/BRAIN W/O DYE: CPT

## 2018-01-12 PROCEDURE — 85610 PROTHROMBIN TIME: CPT

## 2018-01-12 PROCEDURE — 06HM33Z INSERTION OF INFUSION DEVICE INTO RIGHT FEMORAL VEIN, PERCUTANEOUS APPROACH: ICD-10-PCS | Performed by: EMERGENCY MEDICINE

## 2018-01-12 PROCEDURE — 84443 ASSAY THYROID STIM HORMONE: CPT

## 2018-01-12 RX ADMIN — PHENYTOIN SODIUM SCH MLS/HR: 50 INJECTION INTRAMUSCULAR; INTRAVENOUS at 23:28

## 2018-01-12 RX ADMIN — ZOLPIDEM TARTRATE PRN MG: 10 TABLET, FILM COATED ORAL at 23:59

## 2018-01-12 NOTE — RADRPT
EXAM DATE/TIME:  01/12/2018 17:54 

 

[facility] COMPARISON:     

CT BRAIN W/O CONTRAST, November 04, 2017, 14:54.

 

 

INDICATIONS :     

Dizziness and general weakness.

                      

 

RADIATION DOSE:     

56.35 CTDIvol (mGy) 

 

 

 

MEDICAL HISTORY :     

Seizures. Hypertension. 

 

SURGICAL HISTORY :      

Hysterectomy. Fusion, cervical.

 

ENCOUNTER:      

Initial

 

ACUITY:      

1 day

 

PAIN SCALE:      

3/10

 

LOCATION:       

Bilateral cranial 

 

TECHNIQUE:     

Multiple contiguous axial images were obtained of the head.  Using automated exposure control and adj
ustment of the mA and/or kV according to patient size, radiation dose was kept as low as reasonably a
chievable to obtain optimal diagnostic quality images.   DICOM format image data is available electro
nically for review and comparison.  

 

FINDINGS:     

 

CEREBRUM:     

The ventricles are normal for age.  No evidence of midline shift, mass lesion, hemorrhage or acute in
farction.  No extra-axial fluid collections are seen.

 

POSTERIOR FOSSA:     

The cerebellum and brainstem are intact.  The 4th ventricle is midline.  The cerebellopontine angle i
s unremarkable.

 

EXTRACRANIAL:     

The visualized portion of the orbits is intact.

 

SKULL:     

Post surgical findings with metallic hardware in the occipital region.

 

CONCLUSION:     

No acute intracranial findings.

 

 

 

 Isac Rey MD on January 12, 2018 at 18:41           

Board Certified Radiologist.

 This report was verified electronically.

## 2018-01-12 NOTE — HHI.HP
__________________________________________________





[facility]


Service


[facility]


Primary Care Physician


Araceli Tom, [facility]


Admission Diagnosis





Syncope/Symptomatic Anemia


Diagnoses:  


(1) GI bleed


Diagnosis:  Principal





(2) Symptomatic anemia


Diagnosis:  Principal





(3) Dizziness


Diagnosis:  Principal





(4) Dehydration


Diagnosis:  Principal





(5) UTI (urinary tract infection)


Diagnosis:  Principal





(6) Seizure disorder


Diagnosis:  Principal





Travel History


International Travel<30 Days:  No


Contact w/Intl Traveler <30 Da:  No


Traveled to Known Affected Are:  No


History of Present Illness


This is a 42-year-old female with a PMH of Anxiety, Depression, COPD and 

Tobacco Abuse who presented to the ER w/ complaints of dizziness, SOB, low Hgb 

and near syncopal episode.  Symptoms have been ongoing for approx 2wks, now w/ 

worsening complaints.  Notes SOB worse w/ exertion, moderate severity, 

associated w/ dizziness.  Last night pt notes significant dizziness w/ near 

syncopal event.  No seizure activity reported.  Today w/ fever at home of 

102.5.  Seen by PCP today, reports Hgb was 6.1.  Denies hematemesis, but 

reports h/o GERD w/ dark bowel movements for the last several days.  On arrival

, /90, HR 69, O2 sat 96% on RA, Afebrile.  CBC essentially unremarkable 

except for GFR 72.  Troponin negative.  INR 1.1.  UA with LE and bacteriuria.  

CT Head with no acute findings.  Hemoccult attempted, however not enough stool 

for testing.





Review of Systems


Except as stated in HPI:  all other systems reviewed are Neg


ROS: 14 point review of systems otherwise negative.





Past Family Social History


Past Medical History


PMH:  Anxiety, Depression, COPD and Tobacco Abuse


Past Surgical History


PAST SURGICAL HISTORY:  , Hysterectomy, Cervical Fusion


Allergies:  


Coded Allergies:  


     Sulfa (Sulfonamide Antibiotics) (Verified  Allergy, Severe, 18)


     azithromycin (Verified  Allergy, Severe, 18)


     ondansetron (Verified  Allergy, Severe, Swelling, 18)


     cyclobenzaprine (Verified  Allergy, Intermediate, 18)


     propoxyphene (Verified  Allergy, Intermediate, 18)


Family History


PAST FAMILY HISTORY:  Reviewed.  No h/o DM or CAD


Social History


PAST SOCIAL HISTORY:  Negative for alcohol or drugs.  Positive for tobacco.





Physical Exam


Vital Signs





Vital Signs








  Date Time  Temp Pulse Resp B/P (MAP) Pulse Ox O2 Delivery O2 Flow Rate FiO2


 


18 19:33  70 19  98 Room Air  


 


18 18:13     (108)    


 


18 14:51 98.6 69 14 146/90 (108) 96   








Physical Exam


PE:


GENERAL: Middle-aged female in no acute distress.


HEENT: PERRLA, EOMI. No scleral icterus or conjunctival pallor. No lid lag or 

facial droop.  


CARDIOVASCULAR: Regular rate and rhythm.  No obvious murmurs to auscultation. 

No chest tenderness to palpation. 


RESPIRATORY: No obvious rhonchi or wheezing. Clear to auscultation. Breath 

sounds equal bilaterally. 


GASTROINTESTINAL: Abdomen soft, mild epigastric tenderness palpation, 

nondistended. BS normal. 


MUSCULOSKELETAL: Extremities without clubbing, cyanosis, or edema. No obvious 

deformities. 


NEUROLOGICAL: Awake, alert and oriented x4. No focal neurologic deficits. 

Moving both upper and lower extremities spontaneously.


Laboratory





Laboratory Tests








Test


  18


17:00 18


19:06 18


20:47


 


Urine Color LIGHT-YELLOW   


 


Urine Turbidity CLEAR   


 


Urine pH 5.5   


 


Urine Specific Gravity 1.005   


 


Urine Protein NEG   


 


Urine Glucose (UA) NEG   


 


Urine Ketones NEG   


 


Urine Occult Blood NEG   


 


Urine Nitrite NEG   


 


Urine Bilirubin NEG   


 


Urine Urobilinogen LESS THAN 2.0   


 


Urine Leukocyte Esterase LARGE   


 


Urine RBC 4   


 


Urine WBC 3   


 


Urine Squamous Epithelial


Cells 5 


  


  


 


 


Urine Amorphous Sediment RARE   


 


Urine Bacteria FEW   


 


Microscopic Urinalysis Comment


  CULT NOT


INDICATED 


  


 


 


Blood Urea Nitrogen  10  


 


Creatinine  0.86  


 


Random Glucose  81  


 


Total Protein  7.5  


 


Albumin  3.8  


 


Calcium Level  8.9  


 


Magnesium Level  2.0  


 


Alkaline Phosphatase  100  


 


Aspartate Amino Transf


(AST/SGOT) 


  16 


  


 


 


Alanine Aminotransferase


(ALT/SGPT) 


  15 


  


 


 


Total Bilirubin  0.2  


 


Sodium Level  138  


 


Potassium Level  4.0  


 


Chloride Level  109  


 


Carbon Dioxide Level  18.8  


 


Anion Gap  10  


 


Estimat Glomerular Filtration


Rate 


  72 


  


 


 


Total Creatine Kinase  128  


 


Creatine Kinase MB  1.9  


 


Troponin I  LESS THAN 0.02  


 


Thyroid Stimulating Hormone


3rd Gen 


  1.980 


  


 


 


White Blood Count   4.6 


 


Red Blood Count   3.19 


 


Hemoglobin   8.0 


 


Hematocrit   25.8 


 


Mean Corpuscular Volume   81.1 


 


Mean Corpuscular Hemoglobin   25.1 


 


Mean Corpuscular Hemoglobin


Concent 


  


  31.0 


 


 


Red Cell Distribution Width   20.0 


 


Platelet Count   307 


 


Mean Platelet Volume   6.8 


 


Neutrophils (%) (Auto)   54.3 


 


Lymphocytes (%) (Auto)   34.4 


 


Monocytes (%) (Auto)   5.8 


 


Eosinophils (%) (Auto)   4.4 


 


Basophils (%) (Auto)   1.1 


 


Neutrophils # (Auto)   2.5 


 


Lymphocytes # (Auto)   1.6 


 


Monocytes # (Auto)   0.3 


 


Eosinophils # (Auto)   0.2 


 


Basophils # (Auto)   0.1 


 


CBC Comment   DIFF FINAL 


 


Differential Comment    


 


Prothrombin Time   10.7 


 


Prothromb Time International


Ratio 


  


  1.1 


 


 


Activated Partial


Thromboplast Time 


  


  22.9 


 














 Date/Time


Source Procedure


Growth Status


 


 


 18 17:00


Nasal Washing Influenza Types A,B Antigen (ANA) - Final


NEGATIVE FOR FLU A AND B ANTIGEN.... Complete








Result Diagram:  


18 190








Caprini VTE Risk Assessment


Caprini VTE Risk Assessment:  No/Low Risk (score <= 1)


VTE Pharm Contraindication:  Active bleeding


Caprini Risk Assessment Model











 Point Value = 1          Point Value = 2  Point Value = 3  Point Value = 5


 


Age 41-60


Minor surgery


BMI > 25 kg/m2


Swollen legs


Varicose veins


Pregnancy or postpartum


History of unexplained or recurrent


   spontaneous 


Oral contraceptives or hormone


   replacement


Sepsis (< 1 month)


Serious lung disease, including


   pneumonia (< 1 month)


Abnormal pulmonary function


Acute myocardial infarction


Congestive heart failure (< 1 month)


History of inflammatory bowel disease


Medical patient at bed rest Age 61-74


Arthroscopic surgery


Major open surgery (> 45 min)


Laparoscopic surgery (> 45 min)


Malignancy


Confined to bed (> 72 hours)


Immobilizing plaster cast


Central venous access Age >= 75


History of VTE


Family history of VTE


Factor V Leiden


Prothrombin 86086G


Lupus anticoagulant


Anticardiolipin antibodies


Elevated serum homocysteine


Heparin-induced thrombocytopenia


Other congenital or acquired


   thrombophilia Stroke (< 1 month)


Elective arthroplasty


Hip, pelvis, or leg fracture


Acute spinal cord injury (< 1 month)








Prophylaxis Regimen











   Total Risk


Factor Score Risk Level Prophylaxis Regimen


 


0-1      Low Early ambulation


 


2 Moderate Order ONE of the following:


*Sequential Compression Device (SCD)


*Heparin 5000 units SQ BID


 


3-4 Higher Order ONE of the following medications:


*Heparin 5000 units SQ TID


*Enoxaparin/Lovenox 40 mg SQ daily (WT < 150 kg, CrCl > 30 mL/min)


*Enoxaparin/Lovenox 30 mg SQ daily (WT < 150 kg, CrCl > 10-29 mL/min)


*Enoxaparin/Lovenox 30 mg SQ BID (WT < 150 kg, CrCl > 30 mL/min)


AND/OR


*Sequential Compression Device (SCD)


 


5 or more Highest Order ONE of the following medications:


*Heparin 5000 units SQ TID (Preferred with Epidurals)


*Enoxaparin/Lovenox 40 mg SQ daily (WT < 150 kg, CrCl > 30 mL/min)


*Enoxaparin/Lovenox 30 mg SQ daily (WT < 150 kg, CrCl > 10-29 mL/min)


*Enoxaparin/Lovenox 30 mg SQ BID (WT < 150 kg, CrCl > 30 mL/min)


AND


*Sequential Compression Device (SCD)











Assessment and Plan


Problem List:  


(1) GI bleed


ICD Code:  K92.2 - Gastrointestinal hemorrhage, unspecified


(2) Symptomatic anemia


ICD Code:  D64.9 - Anemia, unspecified


Status:  Acute


(3) Dehydration


ICD Code:  E86.0 - Dehydration


(4) Dizziness


ICD Code:  R42 - Dizziness and giddiness


(5) UTI (urinary tract infection)


ICD Code:  N39.0 - Urinary tract infection, site not specified


(6) Seizure disorder


ICD Code:  G40.909 - Epilepsy, unspecified, not intractable, without status 

epilepticus


Assessment and Plan


A/P:


1.  GI Bleed:  +melena, Hemoccult attempted however not enough specimen for 

testing, in light of symptomatic anemia and melena will proceed w/ treatment 

for presumed GI Bleed.  Protonix IV.  Consult GI for further evaluation, likely 

EGD/Colonoscopy.  Monitor Hgb/Hct.  


2.  Anemia:  Symptomatic.  Hgb 8.0, previously 7.9 on 17.  Type & Screen.  

Check repeat Hgb at midnight, transfuse if <8.  


3.  Dizziness:  Likely secondary to symptomatic anemia/GI Bleed.  CT Head w/ no 

acute findings, images reviewed by me.  Place on telemetry, monitor vitals.  

Repeat labs in am, replace electrolytes as needed. 


4.  Dehydration:  GFR 72.  IVF for hydration, repeat labs in am.


5.  UTI:  U/a w/ LE, mild bacteriuria, in light of fever and comorbidities will 

treat for uncomplicated UTI w/ IV Rocephin.  IVF for hydration. 


6.  Seizure Disorder:  Chronic.  Resume home medications.  Ativan prn if 

needed. 


7.  DVT Prophylaxis: SCD/Teds.  Pharmacologic contraindication in light of GI 

Bleed


8.  Labs/imaging/records reviewed by me, case discussed w/ ER physician at 

length.





Physician Certification


2 Midnight Certification Type:  Admission for Inpatient Services


Order for Inpatient Services


The services are ordered in accordance with Medicare regulations or non-

Medicare payer requirements, as applicable.  In the case of services not 

specified as inpatient-only, they are appropriately provided as inpatient 

services in accordance with the 2-midnight benchmark.


Estimated LOS (days):  2


 days is the estimated time the patient will need to remain in the hospital, 

assuming treatment plan goals are met and no additional complications.


Post-Hospital Plan:  Not yet determined











Kaylene Delgado MD 2018 21:46

## 2018-01-12 NOTE — PD
Physical Exam


Date Seen by Provider:  Jan 12, 2018


Time Seen by Provider:  18:00


Narrative


I, Dr. Vinson, have reviewed the advance practice practitioner's 

documentation and am in agreement, met with the patient face to face, made the 

diagnosis, and the medical decision making was done by me.  





*My assessment and Findings: Patient seen and evaluated with PA, please see PA 

note for further details.  She is here with multiple issues, dyspnea on exertion

, hemoglobin of 6.1, fevers, and is suspected by PCP to have a GI bleed.  A 

rectal exam was done by PA but not enough stool sample was able to be obtained 

and there is no obvious signs of Hemoccult positive stool at this time.  Lab 

work has been ordered and the patient.  However, there is difficulty with 

obtaining IV access, patient has had problems with axis issues in the past and 

a right femoral central line was done by me.








Laboratory Tests








Test


  1/12/18


17:00 1/12/18


19:06


 


Urine Leukocyte Esterase LARGE (NEG)  


 


Urine RBC 4 /hpf (0-3)  


 


Urine Bacteria


  FEW /hpf


(NONE) 


 


 


Chloride Level


  


  109 MEQ/L


()


 


Carbon Dioxide Level


  


  18.8 MEQ/L


(21.0-32.0)


 


Estimat Glomerular Filtration


Rate 


  72 ML/MIN


(>89)


 


Troponin I


  


  LESS THAN 0.02


NG/ML








Last 24 hours Impressions








Head CT 1/12/18 1627 Signed





Impressions: 





 Service Date/Time:  Friday, January 12, 2018 17:54 - CONCLUSION:  No acute 





 intracranial findings.     Isac Rey MD 











Considering history and recent anemia, I am waiting to confirm blood work and 

planning to transfuse the patient once confirmed and it made patient for 

further evaluation by GI.





Data


Data


Last Documented VS





Vital Signs








  Date Time  Temp Pulse Resp B/P (MAP) Pulse Ox O2 Delivery O2 Flow Rate FiO2


 


1/12/18 19:33  70 19  98 Room Air  


 


1/12/18 18:13     (108)    


 


1/12/18 14:51 98.6       








Orders





 Orders


Electrocardiogram (1/12/18 15:21)


Complete Blood Count With Diff (1/12/18 15:21)


Comprehensive Metabolic Panel (1/12/18 15:21)


Magnesium (Mg) (1/12/18 15:21)


Ckmb (Isoenzyme) Profile (1/12/18 15:21)


Troponin I (1/12/18 15:21)


Act Partial Throm Time (Ptt) (1/12/18 15:21)


Prothrombin Time / Inr (Pt) (1/12/18 15:21)


Urinalysis - C+S If Indicated (1/12/18 15:21)


Type And Screen (1/12/18 15:21)


Thyroid Stimulating Hormone (1/12/18 15:21)


Iv Access Insert/Monitor (1/12/18 16:27)


Ecg Monitoring (1/12/18 16:27)


Oximetry (1/12/18 16:27)


Pantoprazole Inj (Protonix Inj) (1/12/18 16:30)


Sodium Chlor 0.9% 1000 Ml Inj (Ns 1000 M (1/12/18 16:27)


Sodium Chloride 0.9% Flush (Ns Flush) (1/12/18 16:30)


Al-Mag Hy-Si 40-40-4 Mg/Ml Liq (Mag-Al P (1/12/18 16:30)


Lidocaine 2% Viscous (Xylocaine 2% Visco (1/12/18 16:30)


Prochlorperazine Inj (Compazine Inj) (1/12/18 16:30)


Morphine Inj (Morphine Inj) (1/12/18 16:30)


Ct Brain W/O Iv Contrast(Rout) (1/12/18 16:27)


Influenzae A/B Antigen (1/12/18 16:30)


Vascular Access Team Consult/P PRN (1/12/18 16:53)


Vascular Poc Ultrasound (1/12/18 )


Morphine Inj (Morphine Inj) (1/12/18 18:30)


Prochlorperazine Inj (Compazine Inj) (1/12/18 18:30)


Morphine Inj (Morphine Inj) (1/12/18 19:30)


CKMB (1/12/18 19:06)


CKMB% (1/12/18 19:06)





Labs





Laboratory Tests








Test


  1/12/18


17:00 1/12/18


19:06


 


Urine Color LIGHT-YELLOW  


 


Urine Turbidity CLEAR  


 


Urine pH 5.5  


 


Urine Specific Gravity 1.005  


 


Urine Protein NEG mg/dL  


 


Urine Glucose (UA) NEG mg/dL  


 


Urine Ketones NEG mg/dL  


 


Urine Occult Blood NEG  


 


Urine Nitrite NEG  


 


Urine Bilirubin NEG  


 


Urine Urobilinogen


  LESS THAN 2.0


MG/DL 


 


 


Urine Leukocyte Esterase LARGE  


 


Urine RBC 4 /hpf  


 


Urine WBC 3 /hpf  


 


Urine Squamous Epithelial


Cells 5 /hpf 


  


 


 


Urine Amorphous Sediment RARE  


 


Urine Bacteria FEW /hpf  


 


Microscopic Urinalysis Comment


  CULT NOT


INDICATED 


 


 


Blood Urea Nitrogen  10 MG/DL 


 


Creatinine  0.86 MG/DL 


 


Random Glucose  81 MG/DL 


 


Total Protein  7.5 GM/DL 


 


Albumin  3.8 GM/DL 


 


Calcium Level  8.9 MG/DL 


 


Magnesium Level  2.0 MG/DL 


 


Alkaline Phosphatase  100 U/L 


 


Aspartate Amino Transf


(AST/SGOT) 


  16 U/L 


 


 


Alanine Aminotransferase


(ALT/SGPT) 


  15 U/L 


 


 


Total Bilirubin  0.2 MG/DL 


 


Sodium Level  138 MEQ/L 


 


Potassium Level  4.0 MEQ/L 


 


Chloride Level  109 MEQ/L 


 


Carbon Dioxide Level  18.8 MEQ/L 


 


Anion Gap  10 MEQ/L 


 


Estimat Glomerular Filtration


Rate 


  72 ML/MIN 


 


 


Total Creatine Kinase  128 U/L 


 


Troponin I


  


  LESS THAN 0.02


NG/ML


 


Thyroid Stimulating Hormone


3rd Gen 


  1.980 uIU/ML 


 











MDM


Medical Record Reviewed:  Yes


Supervised Visit with EDEL:  Yes


Procedures


**Procedure Narrative**


CENTRAL VENOUS LINE: The site was prepped with chlorohexidine and sterilely 

draped. It was infiltrated with 1% lidocaine plain.  Ultrasound was used to 

identify the right femoral vein, The deep vein was cannulated using normal 

Seldinger technique.  A central line was placed in the right femoral site and 

secured with simple interrupted suture. The site was sterilely dressed.  The 

patient tolerated the procedure well.





Diagnosis





 Primary Impression:  


 Symptomatic anemia


Condition:  Stable











Laurie Vinson MD Jan 12, 2018 20:05

## 2018-01-12 NOTE — PD
HPI


Chief Complaint:  Dizziness


Time Seen by Provider:  16:19


Travel History


International Travel<30 days:  No


Contact w/Intl Traveler<30days:  No


Traveled to known affect area:  No





History of Present Illness


HPI


42-year-old  female presents the emergency department with history of 

syncope last evening, and complaints of increased dyspnea with exertion over 

the past 2 weeks.  Patient states chronic heartburn and epigastric discomfort 

for which she takes omeprazole 40 mg daily.  Patient states recent fever 102.5 

yesterday.  Patient reports nausea vomiting the past 24 hours.  Patient states 

dark stool over the past several weeks.  Patient states she saw her primary 

care last week and felt she should be seen here at that time, and then one 

again today, and he sent her here.  Patient reports her hemoglobin is 6.1 at 

her doctor's office.  Patient has had a total hysterectomy.  She denies blood 

in her stool but has had dark stools as stated previously.  Patient states she 

has had a headache of approximately 8 out of 10 since yesterday.  Patient does 

have history of seizure disorder treated with phenobarbital.  She feels her 

syncopal episode last evening was different than her typical seizures.  

Progressively worsening dyspnea with exertion over the past several weeks.  She 

is allergic to sulfa, azithromycin, cyclobenzaprine, Zofran, and propoxyphene.





PFSH


Past Medical History


Arthritis:  Yes (knees and neck)


Autoimmune Disease:  No


Anxiety:  Yes


Depression:  Yes


Cancer:  No


Cardiovascular Problems:  No


COPD:  Yes (beginning stages)


Diabetes:  No


Endocrine:  No


Gastrointestinal Disorders:  Yes


Genitourinary:  No


Hypertension:  Yes


Immune Disorder:  No


Musculoskeletal:  Yes


Neurologic:  Yes


Psychiatric:  Yes


Reproductive:  No


Respiratory:  Yes


Migraines:  Yes


Seizures:  Yes


Thyroid Disease:  No


Ulcer:  Yes


Pregnant?:  Not Pregnant





Past Surgical History


Abdominal Surgery:  No


Cardiac Surgery:  No


Ear Surgery:  No


Endocrine Surgery:  No


Eye Surgery:  No


Genitourinary Surgery:  No


Gynecologic Surgery:  Yes ( x2)


Hysterectomy:  Yes


Neurologic Surgery:  Yes (Cervical Fusion)


Oral Surgery:  No


Pacemaker:  No


Thoracic Surgery:  No


Other Surgery:  Yes





Social History


Alcohol Use:  No


Tobacco Use:  Yes


Substance Use:  No





Allergies-Medications


(Allergen,Severity, Reaction):  


Coded Allergies:  


     Sulfa (Sulfonamide Antibiotics) (Verified  Allergy, Severe, 18)


     azithromycin (Verified  Allergy, Severe, 18)


     ondansetron (Verified  Allergy, Severe, Swelling, 18)


     cyclobenzaprine (Verified  Allergy, Intermediate, 18)


     propoxyphene (Verified  Allergy, Intermediate, 18)


Reported Meds & Prescriptions





Reported Meds & Active Scripts


Active


Atenolol 50 Mg Tab 50 Mg PO BID 30 Days


Catapres (Clonidine) 0.2 Mg Tab 0.2 Mg PO TID 30 Days


Reported


Ambien (Zolpidem Tartrate) 10 Mg Tab 10 Mg PO HS PRN


Klonopin (Clonazepam) 1 Mg Tab 1 Mg PO TID


Fioricet (Butalbital-Acetaminophen-Caffeine) -40 Mg Cap 2 Cap PO Q4H PRN


Phenobarbital 64.8 Mg Tab 64.8 Mg PO TID


Gabapentin 800 Mg Tab 800 Mg PO TID


Fluoxetine (Fluoxetine HCl) 40 Mg Cap 40 Cap PO DAILY








Review of Systems


Except as stated in HPI:  all other systems reviewed are Neg


General / Constitutional:  Positive: Fever, Chills


Eyes:  No: Visual changes


HENT:  Positive: Headaches (see history of present illness), Lightheadedness, No

: Vertigo, Sore Throat, Rhinitis, Rhinorrhea, Congestion, Nosebleed, Neck 

Stiffness, Neck Pain, Dental Difficulties, Earache


Cardiovascular:  Positive: Dyspnea on exertion, No: Chest Pain or Discomfort, 

Palpitations, Irregular Rhythm, Tachycardia


Respiratory:  No: Cough, Shortness of Breath, Wheezing


Gastrointestinal:  Positive: Nausea, Vomiting, Abdominal Pain, Dysphagia (see 

history of present illness), No: Diarrhea, Hematemesis, Hematochezia, 

Constipation


Genitourinary:  No: Urgency, Frequency, Dysuria


Musculoskeletal:  No: Pain


Skin:  No Rash


Neurologic:  Positive: Syncope (see history of present illness), No: Weakness


Psychiatric:  No: Depression


Endocrine:  No: Polydipsia


Hematologic/Lymphatic:  No: Easy Bruising





Physical Exam


Narrative


GENERAL: Patient appears in no obvious distress.


SKIN: Warm and dry.  Poor pallor.  Normal turgor.


HEAD: Atraumatic. Normocephalic. 


EYES: Pupils equal and round. No scleral icterus. No injection or drainage. 


ENT: No nasal bleeding or discharge.  Mucous membranes pink and moist.  TMs are 

clear bilaterally.  Pharynx is clear.  Airway is patent.


NECK: Trachea midline.  Supple and nontender.


CARDIOVASCULAR: Regular rate and rhythm.  No murmurs gallops or rubs.


RESPIRATORY: No accessory muscle use. Clear to auscultation. Breath sounds 

equal bilaterally. 


GASTROINTESTINAL: Abdomen soft, mild to moderate epigastric tenderness.  No 

point tenderness or rebound, nondistended. Hepatic and splenic margins not 

palpable. 


MUSCULOSKELETAL: Extremities without clubbing, cyanosis, or edema. No obvious 

deformities. 


NEUROLOGICAL: Awake and alert. No obvious cranial nerve deficits.  Motor 

grossly within normal limits. Five out of 5 muscle strength in the arms and 

legs.  Normal speech.


PSYCHIATRIC: Appropriate mood and affect; insight and judgment normal.





Data


Data


Last Documented VS





Vital Signs








  Date Time  Temp Pulse Resp B/P (MAP) Pulse Ox O2 Delivery O2 Flow Rate FiO2


 


18 19:33  70 19  98 Room Air  


 


18 18:13     (108)    


 


18 14:51 98.6       








Orders





 Orders


Electrocardiogram (18 15:21)


Complete Blood Count With Diff (18 15:21)


Comprehensive Metabolic Panel (18 15:21)


Magnesium (Mg) (18 15:21)


Ckmb (Isoenzyme) Profile (18 15:21)


Troponin I (18 15:21)


Act Partial Throm Time (Ptt) (18 15:21)


Prothrombin Time / Inr (Pt) (18 15:21)


Urinalysis - C+S If Indicated (18 15:21)


Type And Screen (18 15:21)


Thyroid Stimulating Hormone (18 15:21)


Iv Access Insert/Monitor (18 16:27)


Ecg Monitoring (18 16:27)


Oximetry (18 16:27)


Pantoprazole Inj (Protonix Inj) (18 16:30)


Sodium Chlor 0.9% 1000 Ml Inj (Ns 1000 M (18 16:27)


Sodium Chloride 0.9% Flush (Ns Flush) (18 16:30)


Al-Mag Hy-Si 40-40-4 Mg/Ml Liq (Mag-Al P (18 16:30)


Lidocaine 2% Viscous (Xylocaine 2% Visco (18 16:30)


Prochlorperazine Inj (Compazine Inj) (18 16:30)


Morphine Inj (Morphine Inj) (18 16:30)


Ct Brain W/O Iv Contrast(Rout) (18 16:27)


Influenzae A/B Antigen (18 16:30)


Morphine Inj (Morphine Inj) (18 18:30)


Prochlorperazine Inj (Compazine Inj) (18 18:30)


Morphine Inj (Morphine Inj) (18 19:30)


CKMB (18 19:06)


CKMB% (18 19:06)


Admit Order (Ed Use Only) (18 21:38)





Labs





Laboratory Tests








Test


  18


17:00 18


19:06 18


20:47


 


Urine Color LIGHT-YELLOW   


 


Urine Turbidity CLEAR   


 


Urine pH 5.5   


 


Urine Specific Gravity 1.005   


 


Urine Protein NEG mg/dL   


 


Urine Glucose (UA) NEG mg/dL   


 


Urine Ketones NEG mg/dL   


 


Urine Occult Blood NEG   


 


Urine Nitrite NEG   


 


Urine Bilirubin NEG   


 


Urine Urobilinogen


  LESS THAN 2.0


MG/DL 


  


 


 


Urine Leukocyte Esterase LARGE   


 


Urine RBC 4 /hpf   


 


Urine WBC 3 /hpf   


 


Urine Squamous Epithelial


Cells 5 /hpf 


  


  


 


 


Urine Amorphous Sediment RARE   


 


Urine Bacteria FEW /hpf   


 


Microscopic Urinalysis Comment


  CULT NOT


INDICATED 


  


 


 


Blood Urea Nitrogen  10 MG/DL  


 


Creatinine  0.86 MG/DL  


 


Random Glucose  81 MG/DL  


 


Total Protein  7.5 GM/DL  


 


Albumin  3.8 GM/DL  


 


Calcium Level  8.9 MG/DL  


 


Magnesium Level  2.0 MG/DL  


 


Alkaline Phosphatase  100 U/L  


 


Aspartate Amino Transf


(AST/SGOT) 


  16 U/L 


  


 


 


Alanine Aminotransferase


(ALT/SGPT) 


  15 U/L 


  


 


 


Total Bilirubin  0.2 MG/DL  


 


Sodium Level  138 MEQ/L  


 


Potassium Level  4.0 MEQ/L  


 


Chloride Level  109 MEQ/L  


 


Carbon Dioxide Level  18.8 MEQ/L  


 


Anion Gap  10 MEQ/L  


 


Estimat Glomerular Filtration


Rate 


  72 ML/MIN 


  


 


 


Total Creatine Kinase  128 U/L  


 


Creatine Kinase MB  1.9 NG/ML  


 


Troponin I


  


  LESS THAN 0.02


NG/ML 


 


 


Thyroid Stimulating Hormone


3rd Gen 


  1.980 uIU/ML 


  


 


 


White Blood Count   4.6 TH/MM3 


 


Red Blood Count   3.19 MIL/MM3 


 


Hemoglobin   8.0 GM/DL 


 


Hematocrit   25.8 % 


 


Mean Corpuscular Volume   81.1 FL 


 


Mean Corpuscular Hemoglobin   25.1 PG 


 


Mean Corpuscular Hemoglobin


Concent 


  


  31.0 % 


 


 


Red Cell Distribution Width   20.0 % 


 


Platelet Count   307 TH/MM3 


 


Mean Platelet Volume   6.8 FL 


 


Neutrophils (%) (Auto)   54.3 % 


 


Lymphocytes (%) (Auto)   34.4 % 


 


Monocytes (%) (Auto)   5.8 % 


 


Eosinophils (%) (Auto)   4.4 % 


 


Basophils (%) (Auto)   1.1 % 


 


Neutrophils # (Auto)   2.5 TH/MM3 


 


Lymphocytes # (Auto)   1.6 TH/MM3 


 


Monocytes # (Auto)   0.3 TH/MM3 


 


Eosinophils # (Auto)   0.2 TH/MM3 


 


Basophils # (Auto)   0.1 TH/MM3 


 


CBC Comment   DIFF FINAL 


 


Differential Comment    


 


Prothrombin Time   10.7 SEC 


 


Prothromb Time International


Ratio 


  


  1.1 RATIO 


 


 


Activated Partial


Thromboplast Time 


  


  22.9 SEC 


 











MDM


Medical Decision Making


Medical Screen Exam Complete:  Yes


Emergency Medical Condition:  Yes


Medical Record Reviewed:  Yes


Differential Diagnosis


Influenza.  Nausea vomiting.  Upper GI bleed.  Anemia.  Dyspnea with exertion.  

History of syncope.


Narrative Course


Labs ordered including CBC, CMP, cardiac panel, coagulation studies, and 

urinalysis.


Type and screen is ordered.


Rapid influenza is ordered.


CT of the head is ordered.


EKG and chest x-ray is ordered.


IV access is obtained patient is given 1000 mL normal saline bolus.


Patient is given 2 mg morphine IV as well as 5 mg Compazine IV.


EKG shows normal sinus rhythm without ST.  Acute changes.


Rapid influenza is negative.


Rectal exam shows nonspecific guaiac test due to lack of stool.


Due to the patient's lack of venous access after multiple attempts, Dr. WHITFIELD 

placed a central line.


Urinalysis is unremarkable.


CBC shows white blood cell count of 4.6, hemoglobin is 8.0 which is typical for 

the patient looking back on her history.


Coagulation studies are unremarkable.


Chemistries are unremarkable.  Troponin is less than 0.02.


Patient is discussed with Dr. Knox, and she is felt to warrant observation 

considering her report of syncopal episode, and worsening exertional dyspnea in 

the presence of anemia.


Call was placed to the hospitalist to discuss admission.


Patient was discussed with Dr. Delgado who accepted the patient for inpatient 

admission.





Diagnosis





 Primary Impression:  


 Symptomatic anemia


 Additional Impressions:  


 Near syncope


 Headache


 Qualified Codes:  R51 - Headache


 Nausea





Admitting Information


Admitting Physician Requests:  Admit


Condition:  Stable











Scott Phillips 2018 16:27

## 2018-01-13 VITALS
OXYGEN SATURATION: 99 % | HEART RATE: 88 BPM | RESPIRATION RATE: 20 BRPM | SYSTOLIC BLOOD PRESSURE: 145 MMHG | DIASTOLIC BLOOD PRESSURE: 61 MMHG | TEMPERATURE: 98.4 F

## 2018-01-13 VITALS
RESPIRATION RATE: 18 BRPM | OXYGEN SATURATION: 98 % | SYSTOLIC BLOOD PRESSURE: 125 MMHG | HEART RATE: 75 BPM | TEMPERATURE: 98 F | DIASTOLIC BLOOD PRESSURE: 73 MMHG

## 2018-01-13 VITALS
TEMPERATURE: 96.1 F | HEART RATE: 81 BPM | RESPIRATION RATE: 15 BRPM | SYSTOLIC BLOOD PRESSURE: 133 MMHG | DIASTOLIC BLOOD PRESSURE: 96 MMHG | OXYGEN SATURATION: 97 %

## 2018-01-13 VITALS
SYSTOLIC BLOOD PRESSURE: 119 MMHG | HEART RATE: 80 BPM | DIASTOLIC BLOOD PRESSURE: 65 MMHG | OXYGEN SATURATION: 97 % | RESPIRATION RATE: 17 BRPM | TEMPERATURE: 97.5 F

## 2018-01-13 VITALS
DIASTOLIC BLOOD PRESSURE: 87 MMHG | OXYGEN SATURATION: 98 % | RESPIRATION RATE: 18 BRPM | HEART RATE: 86 BPM | TEMPERATURE: 96.8 F | SYSTOLIC BLOOD PRESSURE: 146 MMHG

## 2018-01-13 VITALS
DIASTOLIC BLOOD PRESSURE: 89 MMHG | OXYGEN SATURATION: 99 % | HEART RATE: 95 BPM | RESPIRATION RATE: 15 BRPM | TEMPERATURE: 96.5 F | SYSTOLIC BLOOD PRESSURE: 154 MMHG

## 2018-01-13 VITALS
OXYGEN SATURATION: 96 % | SYSTOLIC BLOOD PRESSURE: 112 MMHG | TEMPERATURE: 97.7 F | DIASTOLIC BLOOD PRESSURE: 58 MMHG | RESPIRATION RATE: 16 BRPM | HEART RATE: 80 BPM

## 2018-01-13 VITALS
SYSTOLIC BLOOD PRESSURE: 146 MMHG | RESPIRATION RATE: 18 BRPM | HEART RATE: 86 BPM | DIASTOLIC BLOOD PRESSURE: 87 MMHG | OXYGEN SATURATION: 98 % | TEMPERATURE: 96.8 F

## 2018-01-13 VITALS
TEMPERATURE: 98.5 F | SYSTOLIC BLOOD PRESSURE: 113 MMHG | DIASTOLIC BLOOD PRESSURE: 71 MMHG | OXYGEN SATURATION: 96 % | HEART RATE: 68 BPM | RESPIRATION RATE: 18 BRPM

## 2018-01-13 VITALS
TEMPERATURE: 96.1 F | HEART RATE: 81 BPM | DIASTOLIC BLOOD PRESSURE: 96 MMHG | SYSTOLIC BLOOD PRESSURE: 133 MMHG | RESPIRATION RATE: 15 BRPM | OXYGEN SATURATION: 97 %

## 2018-01-13 LAB
ALBUMIN SERPL-MCNC: 3.7 GM/DL (ref 3.4–5)
ALP SERPL-CCNC: 101 U/L (ref 45–117)
ALT SERPL-CCNC: 14 U/L (ref 10–53)
AST SERPL-CCNC: 16 U/L (ref 15–37)
BASOPHILS # BLD AUTO: 0.1 TH/MM3 (ref 0–0.2)
BASOPHILS NFR BLD: 0.9 % (ref 0–2)
BILIRUB SERPL-MCNC: 0.2 MG/DL (ref 0.2–1)
BUN SERPL-MCNC: 10 MG/DL (ref 7–18)
CALCIUM SERPL-MCNC: 8.5 MG/DL (ref 8.5–10.1)
CHLORIDE SERPL-SCNC: 114 MEQ/L (ref 98–107)
CREAT SERPL-MCNC: 0.84 MG/DL (ref 0.5–1)
EOSINOPHIL # BLD: 0.4 TH/MM3 (ref 0–0.4)
EOSINOPHIL NFR BLD: 5.3 % (ref 0–4)
ERYTHROCYTE [DISTWIDTH] IN BLOOD BY AUTOMATED COUNT: 18.7 % (ref 11.6–17.2)
GFR SERPLBLD BASED ON 1.73 SQ M-ARVRAT: 74 ML/MIN (ref 89–?)
GLUCOSE SERPL-MCNC: 67 MG/DL (ref 74–106)
HCO3 BLD-SCNC: 20.5 MEQ/L (ref 21–32)
HCT VFR BLD CALC: 23 % (ref 35–46)
HCT VFR BLD CALC: 32.9 % (ref 35–46)
HCT VFR BLD CALC: 33.1 % (ref 35–46)
HGB BLD-MCNC: 10.4 GM/DL (ref 11.6–15.3)
HGB BLD-MCNC: 10.8 GM/DL (ref 11.6–15.3)
HGB BLD-MCNC: 7.1 GM/DL (ref 11.6–15.3)
LYMPHOCYTES # BLD AUTO: 2.2 TH/MM3 (ref 1–4.8)
LYMPHOCYTES NFR BLD AUTO: 29.4 % (ref 9–44)
MCH RBC QN AUTO: 26.9 PG (ref 27–34)
MCHC RBC AUTO-ENTMCNC: 32.8 % (ref 32–36)
MCV RBC AUTO: 82 FL (ref 80–100)
MONOCYTE #: 0.5 TH/MM3 (ref 0–0.9)
MONOCYTES NFR BLD: 7.4 % (ref 0–8)
NEUTROPHILS # BLD AUTO: 4.2 TH/MM3 (ref 1.8–7.7)
NEUTROPHILS NFR BLD AUTO: 57 % (ref 16–70)
PLATELET # BLD: 200 TH/MM3 (ref 150–450)
PMV BLD AUTO: 7.7 FL (ref 7–11)
PROT SERPL-MCNC: 7.2 GM/DL (ref 6.4–8.2)
RBC # BLD AUTO: 4.01 MIL/MM3 (ref 4–5.3)
SODIUM SERPL-SCNC: 142 MEQ/L (ref 136–145)
WBC # BLD AUTO: 7.4 TH/MM3 (ref 4–11)

## 2018-01-13 PROCEDURE — 30233N1 TRANSFUSION OF NONAUTOLOGOUS RED BLOOD CELLS INTO PERIPHERAL VEIN, PERCUTANEOUS APPROACH: ICD-10-PCS | Performed by: EMERGENCY MEDICINE

## 2018-01-13 RX ADMIN — PHENYTOIN SODIUM SCH MLS/HR: 50 INJECTION INTRAMUSCULAR; INTRAVENOUS at 15:27

## 2018-01-13 RX ADMIN — STANDARDIZED SENNA CONCENTRATE AND DOCUSATE SODIUM SCH TAB: 8.6; 5 TABLET, FILM COATED ORAL at 09:23

## 2018-01-13 RX ADMIN — Medication SCH ML: at 09:24

## 2018-01-13 RX ADMIN — ATENOLOL SCH MG: 50 TABLET ORAL at 21:18

## 2018-01-13 RX ADMIN — MORPHINE SULFATE PRN MG: 2 INJECTION, SOLUTION INTRAMUSCULAR; INTRAVENOUS at 12:19

## 2018-01-13 RX ADMIN — MORPHINE SULFATE PRN MG: 2 INJECTION, SOLUTION INTRAMUSCULAR; INTRAVENOUS at 21:52

## 2018-01-13 RX ADMIN — MORPHINE SULFATE PRN MG: 2 INJECTION, SOLUTION INTRAMUSCULAR; INTRAVENOUS at 04:59

## 2018-01-13 RX ADMIN — GABAPENTIN SCH MG: 400 CAPSULE ORAL at 12:18

## 2018-01-13 RX ADMIN — GABAPENTIN SCH MG: 400 CAPSULE ORAL at 17:20

## 2018-01-13 RX ADMIN — PANTOPRAZOLE SODIUM SCH MG: 40 INJECTION, POWDER, FOR SOLUTION INTRAVENOUS at 06:00

## 2018-01-13 RX ADMIN — PHENYTOIN SODIUM SCH MLS/HR: 50 INJECTION INTRAMUSCULAR; INTRAVENOUS at 09:24

## 2018-01-13 RX ADMIN — ATENOLOL SCH MG: 50 TABLET ORAL at 09:23

## 2018-01-13 RX ADMIN — GABAPENTIN SCH MG: 400 CAPSULE ORAL at 09:23

## 2018-01-13 RX ADMIN — MORPHINE SULFATE PRN MG: 2 INJECTION, SOLUTION INTRAMUSCULAR; INTRAVENOUS at 09:25

## 2018-01-13 RX ADMIN — PANTOPRAZOLE SODIUM SCH MG: 40 INJECTION, POWDER, FOR SOLUTION INTRAVENOUS at 17:19

## 2018-01-13 RX ADMIN — HYDROCODONE BITARTRATE AND ACETAMINOPHEN PRN TAB: 5; 325 TABLET ORAL at 15:22

## 2018-01-13 RX ADMIN — Medication SCH ML: at 21:20

## 2018-01-13 RX ADMIN — HYDROCODONE BITARTRATE AND ACETAMINOPHEN PRN TAB: 5; 325 TABLET ORAL at 03:30

## 2018-01-13 RX ADMIN — PROCHLORPERAZINE EDISYLATE PRN MG: 5 INJECTION INTRAMUSCULAR; INTRAVENOUS at 05:00

## 2018-01-13 RX ADMIN — PROCHLORPERAZINE EDISYLATE PRN MG: 5 INJECTION INTRAMUSCULAR; INTRAVENOUS at 09:25

## 2018-01-13 RX ADMIN — BUTALBITAL, ACETAMINOPHEN, AND CAFFEINE PRN TAB: 50; 325; 40 TABLET ORAL at 07:36

## 2018-01-13 RX ADMIN — PROCHLORPERAZINE EDISYLATE PRN MG: 5 INJECTION INTRAMUSCULAR; INTRAVENOUS at 21:52

## 2018-01-13 RX ADMIN — STANDARDIZED SENNA CONCENTRATE AND DOCUSATE SODIUM SCH TAB: 8.6; 5 TABLET, FILM COATED ORAL at 21:18

## 2018-01-13 RX ADMIN — PROCHLORPERAZINE EDISYLATE PRN MG: 5 INJECTION INTRAMUSCULAR; INTRAVENOUS at 00:00

## 2018-01-13 RX ADMIN — ZOLPIDEM TARTRATE PRN MG: 10 TABLET, FILM COATED ORAL at 21:52

## 2018-01-13 RX ADMIN — MORPHINE SULFATE PRN MG: 2 INJECTION, SOLUTION INTRAMUSCULAR; INTRAVENOUS at 00:00

## 2018-01-13 NOTE — HHI.PR
Subjective


Remarks


patient c/o neck pain after she passed out.


Denies cp/sob.





Objective


Vitals





Vital Signs








  Date Time  Temp Pulse Resp B/P (MAP) Pulse Ox O2 Delivery O2 Flow Rate FiO2


 


1/13/18 15:39 96.5 95 15 154/89 (110) 99   


 


1/13/18 11:15 96.8 86 18 146/87 98   


 


1/13/18 11:04 96.8 86 18 146/87 (106) 98   


 


1/13/18 10:07 96.1 81 15 133/96 97   


 


1/13/18 08:45 96.1 81 15 133/96 (108) 97   


 


1/13/18 06:36 97.5 80 17 119/65 97   


 


1/13/18 06:08 97.7 80 16 112/58 96   


 


1/13/18 05:04   15     


 


1/13/18 04:30   15     


 


1/13/18 03:47 98.0 75 18 125/73 (90) 98   


 


1/13/18 01:24 98.5 68 18 113/71 (85) 96   


 


1/12/18 22:33        














I/O      


 


 1/12/18 1/12/18 1/12/18 1/13/18 1/13/18 1/13/18





 07:00 15:00 23:00 07:00 15:00 23:00


 


Intake Total   1000 ml 602 ml 900 ml 


 


Balance   1000 ml 602 ml 900 ml 


 


      


 


Intake IV Total   1000 ml 600 ml 250 ml 


 


Packed Cells     400 ml 


 


Blood Product IV Normal Saline Flush    2 ml 250 ml 








Result Diagram:  


1/13/18 2041                                                                   

             1/12/18 1906





Imaging





Last Impressions








Cervical Spine CT 1/14/18 0000 Signed





Impressions: 





 Service Date/Time:  Sunday, January 14, 2018 05:58 - CONCLUSION:  Stable CT 

scan 





 of the cervical spine compared to 11/4/2017.     Manish Watson MD 


 


Head CT 1/12/18 1627 Signed





Impressions: 





 Service Date/Time:  Friday, January 12, 2018 17:54 - CONCLUSION:  No acute 





 intracranial findings.     Isac Rey MD 








Objective Remarks


GENERAL: Middle-aged female in no acute distress.


HEENT: PERRLA, EOMI. No scleral icterus or conjunctival pallor. No lid lag or 

facial droop.  There is pain on palpation of posterior neck.


CARDIOVASCULAR: Regular rate and rhythm.  No obvious murmurs to auscultation. 

No chest tenderness to palpation. 


RESPIRATORY: No obvious rhonchi or wheezing. Clear to auscultation. Breath 

sounds equal bilaterally. 


GASTROINTESTINAL: Abdomen soft, mild epigastric tenderness palpation, 

nondistended. BS normal. 


MUSCULOSKELETAL: Extremities without clubbing, cyanosis, or edema. No obvious 

deformities. 


NEUROLOGICAL: Awake, alert and oriented x4. No focal neurologic deficits. 

Moving both upper and lower extremities spontaneously.


Medications and IVs





Current Medications








 Medications


  (Trade)  Dose


 Ordered  Sig/Joyce


 Route  Start Time


 Stop Time Status Last Admin


 


  (NS Flush)  2 ml  UNSCH  PRN


 IV FLUSH  1/12/18 21:45


     


 


 


  (NS Flush)  2 ml  BID


 IV FLUSH  1/13/18 09:00


    1/14/18 08:38


 


 


  (Tylenol)  650 mg  Q6H  PRN


 PO  1/12/18 21:45


     


 


 


  (Norco  5-325 Mg)  1 tab  Q4H  PRN


 PO  1/12/18 21:45


    1/14/18 00:09


 


 


  (Gloria-Colace)  1 tab  BID


 PO  1/13/18 09:00


    1/14/18 08:38


 


 


  (Milk Of


 Magnesia Liq)  30 ml  Q12H  PRN


 PO  1/12/18 21:45


     


 


 


  (Senokot)  17.2 mg  Q12H  PRN


 PO  1/12/18 21:45


     


 


 


  (Dulcolax Supp)  10 mg  DAILY  PRN


 RECTAL  1/12/18 21:45


     


 


 


  (Lactulose Liq)  30 ml  DAILY  PRN


 PO  1/12/18 21:45


     


 


 


  (Protonix Inj)  40 mg  Q12H


 IV PUSH  1/13/18 06:00


    1/14/18 05:42


 


 


  (Tenormin)  50 mg  BID


 PO  1/13/18 09:00


    1/13/18 21:18


 


 


  (KlonoPIN)  1 mg  TID


 PO  1/13/18 09:00


    1/14/18 08:37


 


 


  (PROzac)  40 mg  DAILY


 PO  1/13/18 09:00


    1/14/18 08:38


 


 


  (Neurontin)  800 mg  TID


 PO  1/13/18 09:00


    1/14/18 08:38


 


 


  (PHENobarbital)  64.8 mg  TID


 PO  1/13/18 09:00


    1/14/18 08:39


 


 


  (Ambien)  10 mg  HS  PRN


 PO  1/12/18 21:45


    1/13/18 21:52


 


 


  (Fioricet


 325-50-40)  2 tab  Q4H  PRN


 PO  1/12/18 22:30


    1/14/18 08:40


 


 


  (Compazine Inj)  5 mg  Q4H  PRN


 IV PUSH  1/12/18 23:45


    1/14/18 08:38


 


 


 Lactated Ringer's  1,000 ml @ 


 30 mls/hr  Q24H PRN


 IV  1/13/18 16:00


 1/16/18 15:59   


 


 


 Sodium Chloride  500 ml @ 


 30 mls/hr  [id number] PRN


 IV  1/13/18 16:00


 1/16/18 15:59   


 


 


  (Betadine 5%


 Antisepsis Kit)  1 applic  ON CALL  PRN


 EACH NARE  1/13/18 16:00


 1/16/18 15:59   


 


 


  (Chlorhexidine


 2% Cloth)  3 pack  ON CALL  PRN


 TOPICAL  1/13/18 16:00


 1/16/18 15:59   


 


 


  (Morphine Inj)  4 mg  Q3H  PRN


 IV PUSH  1/13/18 21:45


    1/14/18 05:43


 











A/P


Problem List:  


(1) GI bleed


ICD Code:  K92.2 - Gastrointestinal hemorrhage, unspecified


(2) Symptomatic anemia


ICD Code:  D64.9 - Anemia, unspecified


Status:  Acute


(3) Dehydration


ICD Code:  E86.0 - Dehydration


(4) Dizziness


ICD Code:  R42 - Dizziness and giddiness


(5) UTI (urinary tract infection)


ICD Code:  N39.0 - Urinary tract infection, site not specified


(6) Seizure disorder


ICD Code:  G40.909 - Epilepsy, unspecified, not intractable, without status 

epilepticus


Assessment and Plan


1.  GI Bleed:  +melena, Hemoccult attempted however not enough specimen for 

testing, in light of symptomatic anemia and melena will proceed w/ treatment 

for presumed GI Bleed.  Protonix IV.  Consult GI for further evaluation, likely 

EGD/Colonoscopy.  Monitor Hgb/Hct. 


1/13 Appreciate GI consultation. for EGD in am. Patient is sp transfusion 1 

unit of PRBC;s qith appropriate hemoglobin response.


2.  Anemia:  Symptomatic.  Hgb 8.0, previously 7.9 on 11/5/17.  Type & Screen.  

Check repeat Hgb at midnight, transfuse if <8.  


1/13 sp transfusion of 1 unit of PRBC.


3.  Dizziness:  Likely secondary to symptomatic anemia/GI Bleed.  CT Head w/ no 

acute findings, images reviewed by me.  Place on telemetry, monitor vitals.  

Repeat labs in am, replace electrolytes as needed. 


1/13 Dizziness resolved.


4.  Dehydration:  GFR 72.  IVF for hydration, repeat labs in am.


5.  UTI:  U/a w/ LE, mild bacteriuria, in light of fever and comorbidities will 

treat for uncomplicated UTI w/ IV Rocephin.  IVF for hydration. 


6.  Seizure Disorder:  Chronic.  Resume home medications.  Ativan prn if 

needed. 


7.  DVT Prophylaxis: SCD/Teds.  Pharmacologic contraindication in light of GI 

Bleed.


8. Neck pain: Will obtain a CT of the cervical spine.


Discharge Planning


for EGD in am. Pending CT cervical spine.











Anmol Bowman MD Jan 13, 2018 21:02

## 2018-01-13 NOTE — PD.CONS
HPI


History of Present Illness


This is a 42 year old F who presented to the emergency department yesterday 

with complaints of SOB and dizziness increasing over the past two weeks.  

Reportedly had blood work done a week ago and received the results yesterday 

from her PCP, was told to come to the ER because her hemoglobin was 6.1.  Pt 

does report severe epigastric pain increasing in frequency over the past two 

weeks, states sometimes it has even woken her up in the middle of the night.  

Takes Prilosec but it has not been providing relief in epigastric pain or acid 

reflux recently so she has also been taking Zantac over the counter with 

minimal relief.  Reports black, tarry stools for the past two weeks.  Also 

having nausea and vomiting, states the vomit is dark.  Also complaining of 

dysphagia with solids, states feels like her food is getting stuck.  Denies 

BRBPR or BRB in emesis, unintentional weight loss.  Denies history of GIB.  Has 

never had an EGD or colonoscopy.  Denies ETOH.  Current smoker, five cigarettes 

a day for the past two years. Denies illicit drug use. Denies NSAID use.  

Abdominal surgeries include appendectomy and 2  sections.  H/H 7., 

pt has 2 U PRBCs ordered, one currently transfusing.  


 (Mercedez Loaiza)





PFSH


Past Medical History


Anxiety


Depression


COPD 


Seizure disorder


SVT


Past Surgical History


 x 2 


Hysterectomy


Cervical Fusion


Appendectomy


 (Mercedez Loaiza)


Coded Allergies:  


     Sulfa (Sulfonamide Antibiotics) (Verified  Allergy, Severe, 18)


     azithromycin (Verified  Allergy, Severe, 18)


     ondansetron (Verified  Allergy, Severe, Swelling, 18)


     cyclobenzaprine (Verified  Allergy, Intermediate, 18)


     propoxyphene (Verified  Allergy, Intermediate, 18)


Family History


PAST FAMILY HISTORY:  Reviewed.  No h/o DM or CAD


Social History


Smoker- five cigarettes a day


Denies ETOH


Denies illicit drug use 


 (Mercedez Loaiza)





Review of Systems


Gastrointestinal:  COMPLAINS OF: Abdominal pain (epigastric pain), Black stools

, Diarrhea, Nausea, Vomiting, Difficulty Swallowing, Swelling of Abdomen, 

Heartburn, DENIES: Bloody stools, Constipation, Hematemesis (Mercedez Loaiza)





GI Exam


Vitals I&O





Vital Signs








  Date Time  Temp Pulse Resp B/P (MAP) Pulse Ox O2 Delivery O2 Flow Rate FiO2


 


18 06:36 97.5 80 17 119/65 97   


 


18 06:08 97.7 80 16 112/58 96   


 


18 05:04   15     


 


18 04:30   15     


 


18 03:47 98.0 75 18 125/73 (90) 98   


 


18 01:24 98.5 68 18 113/71 (85) 96   


 


18 22:33        


 


18 19:33  70 19  98 Room Air  


 


18 18:13     (108)    


 


18 14:51 98.6 69 14 146/90 (108) 96   














I/O      


 


 18





 06:59 14:59 22:59 06:59 14:59 22:59


 


Intake Total   1000 ml 602 ml  


 


Balance   1000 ml 602 ml  


 


      


 


Intake IV Total   1000 ml 600 ml  


 


Blood Product IV Normal Saline Flush    2 ml  








Laboratory











Test


  18


17:00 18


19:06 18


20:47 18


03:20


 


Urine Color LIGHT-YELLOW    


 


Urine Turbidity CLEAR    


 


Urine pH 5.5    


 


Urine Specific Gravity 1.005    


 


Urine Protein NEG mg/dL    


 


Urine Glucose (UA) NEG mg/dL    


 


Urine Ketones NEG mg/dL    


 


Urine Occult Blood NEG    


 


Urine Nitrite NEG    


 


Urine Bilirubin NEG    


 


Urine Urobilinogen


  LESS THAN 2.0


MG/DL 


  


  


 


 


Urine Leukocyte Esterase LARGE    


 


Urine RBC 4 /hpf    


 


Urine WBC 3 /hpf    


 


Urine Squamous Epithelial


Cells 5 /hpf 


  


  


  


 


 


Urine Amorphous Sediment RARE    


 


Urine Bacteria FEW /hpf    


 


Microscopic Urinalysis Comment


  CULT NOT


INDICATED 


  


  


 


 


Blood Urea Nitrogen  10 MG/DL   


 


Creatinine  0.86 MG/DL   


 


Random Glucose  81 MG/DL   


 


Total Protein  7.5 GM/DL   


 


Albumin  3.8 GM/DL   


 


Calcium Level  8.9 MG/DL   


 


Magnesium Level  2.0 MG/DL   


 


Alkaline Phosphatase  100 U/L   


 


Aspartate Amino Transf


(AST/SGOT) 


  16 U/L 


  


  


 


 


Alanine Aminotransferase


(ALT/SGPT) 


  15 U/L 


  


  


 


 


Total Bilirubin  0.2 MG/DL   


 


Sodium Level  138 MEQ/L   


 


Potassium Level  4.0 MEQ/L   


 


Chloride Level  109 MEQ/L   


 


Carbon Dioxide Level  18.8 MEQ/L   


 


Anion Gap  10 MEQ/L   


 


Estimat Glomerular Filtration


Rate 


  72 ML/MIN 


  


  


 


 


Total Creatine Kinase  128 U/L   


 


Creatine Kinase MB  1.9 NG/ML   


 


Troponin I


  


  LESS THAN 0.02


NG/ML 


  


 


 


Thyroid Stimulating Hormone


3rd Gen 


  1.980 uIU/ML 


  


  


 


 


White Blood Count   4.6 TH/MM3  


 


Red Blood Count   3.19 MIL/MM3  


 


Hemoglobin   8.0 GM/DL  7.1 GM/DL 


 


Hematocrit   25.8 %  23.0 % 


 


Mean Corpuscular Volume   81.1 FL  


 


Mean Corpuscular Hemoglobin   25.1 PG  


 


Mean Corpuscular Hemoglobin


Concent 


  


  31.0 % 


  


 


 


Red Cell Distribution Width   20.0 %  


 


Platelet Count   307 TH/MM3  


 


Mean Platelet Volume   6.8 FL  


 


Neutrophils (%) (Auto)   54.3 %  


 


Lymphocytes (%) (Auto)   34.4 %  


 


Monocytes (%) (Auto)   5.8 %  


 


Eosinophils (%) (Auto)   4.4 %  


 


Basophils (%) (Auto)   1.1 %  


 


Neutrophils # (Auto)   2.5 TH/MM3  


 


Lymphocytes # (Auto)   1.6 TH/MM3  


 


Monocytes # (Auto)   0.3 TH/MM3  


 


Eosinophils # (Auto)   0.2 TH/MM3  


 


Basophils # (Auto)   0.1 TH/MM3  


 


CBC Comment   DIFF FINAL  


 


Differential Comment     


 


Prothrombin Time   10.7 SEC  


 


Prothromb Time International


Ratio 


  


  1.1 RATIO 


  


 


 


Activated Partial


Thromboplast Time 


  


  22.9 SEC 


  


 














 Date/Time


Source Procedure


Growth Status


 


 


 18 17:00


Nasal Washing Influenza Types A,B Antigen (ANA) - Final


NEGATIVE FOR FLU A AND B ANTIGEN.... Complete








Physical Examination


HEENT: Pupils round and reactive to light; normocephalic; atraumatic; no 

jaundice.  Throat is clear.


NECK: Neck is supple, no JVD, no lymphadenopathy.


CHEST:  Chest is clear to auscultation and percussion.


CARDIAC:  Regular rate and rhythm with no murmur gallop or rubs.


ABDOMEN:  Soft, nondistended, nontender; no hepatosplenomegaly; bowel sounds 

are present in all four quadrants.


EXTREMITIES: No clubbing, cyanosis, or edema.


SKIN:  Normal; no rash; no jaundice.


CNS:  No focal deficits; alert and oriented times three.


 (Mercedez Loaiza)





Assessment and Plan


Plan


Assessment


- Melena- Pt reports black stools for the past two weeks.  Has also been 

vomiting, states the vomit has been dark.


  Denies history of GIB, NSAID use, ETOH.  Does smoke 5 cigarettes a day.  Has 

never had EGD or colonoscopy.


  H/H 7.- 2 U PRBCs have been ordered- one is currently transfusing. Plan 

for EGD tomorrow. 


- Dysphagia with solids, feels like food gets stuck


- Dizziness over the past two weeks- most likely secondary to severe anemia- CT 

head in ER negative.  Pt does


  have history of seizure disorder- takes Phenobarbital 





Plan:


- EGD tomorrow


- Obtain consents


- NPO after MN


- Serial H/H


- Transfuse as needed 


- Protonix


- Supportive care





Pt has been seen and examined by myself and Dr. Rodriguez and this note is written 

on her behalf 








 (Mercedez Loaiza)


Physician Comments


agree with above 


 (Manisha Rodriguez MD)











Mercedez Loaiza 2018 08:43


Manisha Rodriguez MD 2018 19:44

## 2018-01-14 VITALS
SYSTOLIC BLOOD PRESSURE: 180 MMHG | RESPIRATION RATE: 20 BRPM | DIASTOLIC BLOOD PRESSURE: 90 MMHG | HEART RATE: 100 BPM | OXYGEN SATURATION: 98 % | TEMPERATURE: 98.2 F

## 2018-01-14 VITALS
OXYGEN SATURATION: 98 % | SYSTOLIC BLOOD PRESSURE: 180 MMHG | RESPIRATION RATE: 20 BRPM | HEART RATE: 78 BPM | TEMPERATURE: 98.9 F | DIASTOLIC BLOOD PRESSURE: 100 MMHG

## 2018-01-14 VITALS
OXYGEN SATURATION: 95 % | HEART RATE: 78 BPM | RESPIRATION RATE: 18 BRPM | TEMPERATURE: 98.4 F | SYSTOLIC BLOOD PRESSURE: 148 MMHG | DIASTOLIC BLOOD PRESSURE: 82 MMHG

## 2018-01-14 VITALS
DIASTOLIC BLOOD PRESSURE: 62 MMHG | TEMPERATURE: 97 F | RESPIRATION RATE: 20 BRPM | OXYGEN SATURATION: 96 % | SYSTOLIC BLOOD PRESSURE: 104 MMHG | HEART RATE: 95 BPM

## 2018-01-14 VITALS
OXYGEN SATURATION: 96 % | RESPIRATION RATE: 17 BRPM | HEART RATE: 84 BPM | DIASTOLIC BLOOD PRESSURE: 89 MMHG | TEMPERATURE: 98.8 F | SYSTOLIC BLOOD PRESSURE: 147 MMHG

## 2018-01-14 VITALS
OXYGEN SATURATION: 98 % | HEART RATE: 89 BPM | SYSTOLIC BLOOD PRESSURE: 165 MMHG | TEMPERATURE: 96.6 F | RESPIRATION RATE: 20 BRPM | DIASTOLIC BLOOD PRESSURE: 106 MMHG

## 2018-01-14 VITALS — SYSTOLIC BLOOD PRESSURE: 168 MMHG | DIASTOLIC BLOOD PRESSURE: 90 MMHG

## 2018-01-14 VITALS
RESPIRATION RATE: 20 BRPM | OXYGEN SATURATION: 96 % | DIASTOLIC BLOOD PRESSURE: 98 MMHG | HEART RATE: 90 BPM | SYSTOLIC BLOOD PRESSURE: 177 MMHG | TEMPERATURE: 96.8 F

## 2018-01-14 VITALS — DIASTOLIC BLOOD PRESSURE: 90 MMHG | SYSTOLIC BLOOD PRESSURE: 168 MMHG

## 2018-01-14 VITALS — SYSTOLIC BLOOD PRESSURE: 172 MMHG | DIASTOLIC BLOOD PRESSURE: 85 MMHG

## 2018-01-14 LAB
ALBUMIN SERPL-MCNC: 3.7 GM/DL (ref 3.4–5)
ALP SERPL-CCNC: 100 U/L (ref 45–117)
ALT SERPL-CCNC: 14 U/L (ref 10–53)
AST SERPL-CCNC: 17 U/L (ref 15–37)
BASOPHILS # BLD AUTO: 0.1 TH/MM3 (ref 0–0.2)
BASOPHILS NFR BLD: 1 % (ref 0–2)
BILIRUB SERPL-MCNC: 0.2 MG/DL (ref 0.2–1)
BUN SERPL-MCNC: 10 MG/DL (ref 7–18)
CALCIUM SERPL-MCNC: 8.7 MG/DL (ref 8.5–10.1)
CHLORIDE SERPL-SCNC: 110 MEQ/L (ref 98–107)
CREAT SERPL-MCNC: 0.82 MG/DL (ref 0.5–1)
EOSINOPHIL # BLD: 0.3 TH/MM3 (ref 0–0.4)
EOSINOPHIL NFR BLD: 4.4 % (ref 0–4)
ERYTHROCYTE [DISTWIDTH] IN BLOOD BY AUTOMATED COUNT: 18.9 % (ref 11.6–17.2)
GFR SERPLBLD BASED ON 1.73 SQ M-ARVRAT: 76 ML/MIN (ref 89–?)
GLUCOSE SERPL-MCNC: 83 MG/DL (ref 74–106)
HCO3 BLD-SCNC: 24.2 MEQ/L (ref 21–32)
HCT VFR BLD CALC: 32.6 % (ref 35–46)
HGB BLD-MCNC: 10.3 GM/DL (ref 11.6–15.3)
LYMPHOCYTES # BLD AUTO: 1.5 TH/MM3 (ref 1–4.8)
LYMPHOCYTES NFR BLD AUTO: 23.9 % (ref 9–44)
MAGNESIUM SERPL-MCNC: 1.8 MG/DL (ref 1.5–2.5)
MCH RBC QN AUTO: 26 PG (ref 27–34)
MCHC RBC AUTO-ENTMCNC: 31.6 % (ref 32–36)
MCV RBC AUTO: 82.2 FL (ref 80–100)
MONOCYTE #: 0.6 TH/MM3 (ref 0–0.9)
MONOCYTES NFR BLD: 9 % (ref 0–8)
NEUTROPHILS # BLD AUTO: 3.9 TH/MM3 (ref 1.8–7.7)
NEUTROPHILS NFR BLD AUTO: 61.7 % (ref 16–70)
PHOSPHATE SERPL-MCNC: 3.5 MG/DL (ref 2.5–4.9)
PLATELET # BLD: 318 TH/MM3 (ref 150–450)
PMV BLD AUTO: 6.9 FL (ref 7–11)
PROT SERPL-MCNC: 7.3 GM/DL (ref 6.4–8.2)
RBC # BLD AUTO: 3.97 MIL/MM3 (ref 4–5.3)
SODIUM SERPL-SCNC: 141 MEQ/L (ref 136–145)
WBC # BLD AUTO: 6.3 TH/MM3 (ref 4–11)

## 2018-01-14 RX ADMIN — PROCHLORPERAZINE EDISYLATE PRN MG: 5 INJECTION INTRAMUSCULAR; INTRAVENOUS at 17:33

## 2018-01-14 RX ADMIN — MORPHINE SULFATE PRN MG: 2 INJECTION, SOLUTION INTRAMUSCULAR; INTRAVENOUS at 09:57

## 2018-01-14 RX ADMIN — PHENYTOIN SODIUM SCH MLS/HR: 50 INJECTION INTRAMUSCULAR; INTRAVENOUS at 01:32

## 2018-01-14 RX ADMIN — PROCHLORPERAZINE EDISYLATE PRN MG: 5 INJECTION INTRAMUSCULAR; INTRAVENOUS at 22:48

## 2018-01-14 RX ADMIN — PANTOPRAZOLE SODIUM SCH MG: 40 INJECTION, POWDER, FOR SOLUTION INTRAVENOUS at 17:34

## 2018-01-14 RX ADMIN — MECLIZINE HYDROCHLORIDE SCH MG: 25 TABLET ORAL at 21:08

## 2018-01-14 RX ADMIN — STANDARDIZED SENNA CONCENTRATE AND DOCUSATE SODIUM SCH TAB: 8.6; 5 TABLET, FILM COATED ORAL at 08:38

## 2018-01-14 RX ADMIN — PROCHLORPERAZINE EDISYLATE PRN MG: 5 INJECTION INTRAMUSCULAR; INTRAVENOUS at 05:41

## 2018-01-14 RX ADMIN — HYDROCODONE BITARTRATE AND ACETAMINOPHEN PRN TAB: 5; 325 TABLET ORAL at 12:47

## 2018-01-14 RX ADMIN — GABAPENTIN SCH MG: 400 CAPSULE ORAL at 17:34

## 2018-01-14 RX ADMIN — GABAPENTIN SCH MG: 400 CAPSULE ORAL at 12:47

## 2018-01-14 RX ADMIN — MORPHINE SULFATE PRN MG: 2 INJECTION, SOLUTION INTRAMUSCULAR; INTRAVENOUS at 01:23

## 2018-01-14 RX ADMIN — BUTALBITAL, ACETAMINOPHEN, AND CAFFEINE PRN TAB: 50; 325; 40 TABLET ORAL at 14:32

## 2018-01-14 RX ADMIN — HYDROCODONE BITARTRATE AND ACETAMINOPHEN PRN TAB: 5; 325 TABLET ORAL at 00:09

## 2018-01-14 RX ADMIN — GABAPENTIN SCH MG: 400 CAPSULE ORAL at 08:38

## 2018-01-14 RX ADMIN — STANDARDIZED SENNA CONCENTRATE AND DOCUSATE SODIUM SCH TAB: 8.6; 5 TABLET, FILM COATED ORAL at 21:08

## 2018-01-14 RX ADMIN — ATENOLOL SCH MG: 50 TABLET ORAL at 08:40

## 2018-01-14 RX ADMIN — Medication SCH ML: at 21:00

## 2018-01-14 RX ADMIN — PANTOPRAZOLE SODIUM SCH MG: 40 INJECTION, POWDER, FOR SOLUTION INTRAVENOUS at 05:42

## 2018-01-14 RX ADMIN — HYDROCODONE BITARTRATE AND ACETAMINOPHEN PRN TAB: 5; 325 TABLET ORAL at 21:09

## 2018-01-14 RX ADMIN — BUTALBITAL, ACETAMINOPHEN, AND CAFFEINE PRN TAB: 50; 325; 40 TABLET ORAL at 00:37

## 2018-01-14 RX ADMIN — ATENOLOL SCH MG: 50 TABLET ORAL at 21:08

## 2018-01-14 RX ADMIN — MORPHINE SULFATE PRN MG: 2 INJECTION, SOLUTION INTRAMUSCULAR; INTRAVENOUS at 05:43

## 2018-01-14 RX ADMIN — MORPHINE SULFATE PRN MG: 2 INJECTION, SOLUTION INTRAMUSCULAR; INTRAVENOUS at 22:49

## 2018-01-14 RX ADMIN — MORPHINE SULFATE PRN MG: 2 INJECTION, SOLUTION INTRAMUSCULAR; INTRAVENOUS at 14:19

## 2018-01-14 RX ADMIN — PROCHLORPERAZINE EDISYLATE PRN MG: 5 INJECTION INTRAMUSCULAR; INTRAVENOUS at 08:38

## 2018-01-14 RX ADMIN — Medication SCH ML: at 08:38

## 2018-01-14 RX ADMIN — BUTALBITAL, ACETAMINOPHEN, AND CAFFEINE PRN TAB: 50; 325; 40 TABLET ORAL at 08:40

## 2018-01-14 NOTE — HHI.PR
Subjective


Remarks


Patient states feels nauseous.


Also c/o dizziness - describes it as the room spinning around her and is states 

she used to be on meclizine for vertigo.


Denies fevers or chills, no vomiting.


States that neck pain is controlled with current medications.





Objective


Vitals





Vital Signs








  Date Time  Temp Pulse Resp B/P (MAP) Pulse Ox O2 Delivery O2 Flow Rate FiO2


 


1/14/18 12:40 98.2 100 20 180/90 (120) 98   


 


1/14/18 09:06 96.8 90 20 177/98 (124) 96   


 


1/14/18 04:40 97.0 95 20 104/62 (76) 96   


 


1/14/18 00:00 96.6 89 20 165/106 (125) 98   


 


1/13/18 20:00 98.4 88 20 145/61 (89) 99   


 


1/13/18 15:39 96.5 95 15 154/89 (110) 99   














I/O      


 


 1/13/18 1/13/18 1/13/18 1/14/18 1/14/18 1/14/18





 07:00 15:00 23:00 07:00 15:00 23:00


 


Intake Total 602 ml 900 ml  360 ml  


 


Output Total    1275 ml  


 


Balance 602 ml 900 ml  -915 ml  


 


      


 


Intake Oral    360 ml  


 


IV Total 600 ml 250 ml    


 


Packed Cells  400 ml    


 


Blood Product IV Normal Saline Flush 2 ml 250 ml    


 


Output Urine Total    1275 ml  








Result Diagram:  


1/14/18 1000                                                                   

             1/14/18 1000





Imaging





Last Impressions








Cervical Spine CT 1/14/18 0000 Signed





Impressions: 





 Service Date/Time:  Sunday, January 14, 2018 05:58 - CONCLUSION:  Stable CT 

scan 





 of the cervical spine compared to 11/4/2017.     Manish Watson MD 


 


Head CT 1/12/18 1627 Signed





Impressions: 





 Service Date/Time:  Friday, January 12, 2018 17:54 - CONCLUSION:  No acute 





 intracranial findings.     Isac Rey MD 








Objective Remarks


GENERAL: Middle-aged female in no acute distress.


HEENT: PERRLA, EOMI. No scleral icterus or conjunctival pallor. No lid lag or 

facial droop.  There is pain on palpation of posterior neck.


CARDIOVASCULAR: Regular rate and rhythm.  No obvious murmurs to auscultation. 

No chest tenderness to palpation. 


RESPIRATORY: No obvious rhonchi or wheezing. Clear to auscultation. Breath 

sounds equal bilaterally. 


GASTROINTESTINAL: Abdomen soft, mild epigastric tenderness palpation, 

nondistended. BS normal. 


MUSCULOSKELETAL: Extremities without clubbing, cyanosis, or edema. No obvious 

deformities. 


NEUROLOGICAL: Awake, alert and oriented x4. No focal neurologic deficits. 

Moving both upper and lower extremities spontaneously.


Procedures


none


Medications and IVs





Current Medications








 Medications


  (Trade)  Dose


 Ordered  Sig/Joyce


 Route  Start Time


 Stop Time Status Last Admin


 


  (NS Flush)  2 ml  UNSCH  PRN


 IV FLUSH  1/12/18 21:45


     


 


 


  (NS Flush)  2 ml  BID


 IV FLUSH  1/13/18 09:00


    1/14/18 08:38


 


 


  (Tylenol)  650 mg  Q6H  PRN


 PO  1/12/18 21:45


     


 


 


  (Norco  5-325 Mg)  1 tab  Q4H  PRN


 PO  1/12/18 21:45


    1/14/18 12:47


 


 


  (Gloria-Colace)  1 tab  BID


 PO  1/13/18 09:00


    1/14/18 08:38


 


 


  (Milk Of


 Magnesia Liq)  30 ml  Q12H  PRN


 PO  1/12/18 21:45


     


 


 


  (Senokot)  17.2 mg  Q12H  PRN


 PO  1/12/18 21:45


     


 


 


  (Dulcolax Supp)  10 mg  DAILY  PRN


 RECTAL  1/12/18 21:45


     


 


 


  (Lactulose Liq)  30 ml  DAILY  PRN


 PO  1/12/18 21:45


     


 


 


  (Protonix Inj)  40 mg  Q12H


 IV PUSH  1/13/18 06:00


    1/14/18 05:42


 


 


  (Tenormin)  50 mg  BID


 PO  1/13/18 09:00


    1/13/18 21:18


 


 


  (KlonoPIN)  1 mg  TID


 PO  1/13/18 09:00


    1/14/18 12:46


 


 


  (PROzac)  40 mg  DAILY


 PO  1/13/18 09:00


    1/14/18 08:38


 


 


  (Neurontin)  800 mg  TID


 PO  1/13/18 09:00


    1/14/18 12:47


 


 


  (PHENobarbital)  64.8 mg  TID


 PO  1/13/18 09:00


    1/14/18 12:48


 


 


  (Ambien)  10 mg  HS  PRN


 PO  1/12/18 21:45


    1/13/18 21:52


 


 


  (Fioricet


 325-50-40)  2 tab  Q4H  PRN


 PO  1/12/18 22:30


    1/14/18 08:40


 


 


  (Compazine Inj)  5 mg  Q4H  PRN


 IV PUSH  1/12/18 23:45


    1/14/18 08:38


 


 


 Lactated Ringer's  1,000 ml @ 


 30 mls/hr  Q24H PRN


 IV  1/13/18 16:00


 1/16/18 15:59   


 


 


 Sodium Chloride  500 ml @ 


 30 mls/hr  [id number] PRN


 IV  1/13/18 16:00


 1/16/18 15:59   


 


 


  (Betadine 5%


 Antisepsis Kit)  1 applic  ON CALL  PRN


 EACH NARE  1/13/18 16:00


 1/16/18 15:59   


 


 


  (Chlorhexidine


 2% Cloth)  3 pack  ON CALL  PRN


 TOPICAL  1/13/18 16:00


 1/16/18 15:59   


 


 


  (Morphine Inj)  4 mg  Q3H  PRN


 IV PUSH  1/13/18 21:45


    1/14/18 09:57


 


 


  (Catapres)  0.2 mg  TID


 PO  1/14/18 18:00


     


 








Urinary Catheter:  No


Vascular Central Line Catheter:  No





A/P


Problem List:  


(1) GI bleed


ICD Code:  K92.2 - Gastrointestinal hemorrhage, unspecified


(2) Symptomatic anemia


ICD Code:  D64.9 - Anemia, unspecified


Status:  Acute


(3) Dehydration


ICD Code:  E86.0 - Dehydration


(4) Dizziness


ICD Code:  R42 - Dizziness and giddiness


(5) UTI (urinary tract infection)


ICD Code:  N39.0 - Urinary tract infection, site not specified


(6) Seizure disorder


ICD Code:  G40.909 - Epilepsy, unspecified, not intractable, without status 

epilepticus


Assessment and Plan


1.  GI Bleed:  +melena, Hemoccult attempted however not enough specimen for 

testing, in light of symptomatic anemia and melena will proceed w/ treatment 

for presumed GI Bleed.  Protonix IV.  Consult GI for further evaluation, likely 

EGD/Colonoscopy.  Monitor Hgb/Hct. 


1/13 Appreciate GI consultation. for EGD in am. Patient is sp transfusion 1 

unit of PRBC;s qith appropriate hemoglobin response.


1/14 EGD pending.


2.  Anemia:  Symptomatic.  Hgb 8.0, previously 7.9 on 11/5/17.  Type & Screen.  

Check repeat Hgb at midnight, transfuse if <8.  


1/13 sp transfusion of 1 unit of PRBC.


1/14 Hb stable.


3.  Dizziness:  Likely secondary to symptomatic anemia/GI Bleed.  CT Head w/ no 

acute findings, images reviewed by me.  Place on telemetry, monitor vitals.  

Repeat labs in am, replace electrolytes as needed. 


1/13 Dizziness resolved.


1/14 patient still complains of dizziness and describes it as if the room is 

spinning around.  Patient has a history of vertigo.  We'll start the patient on 

oral meclizine.


4.  Dehydration:  Resolved after IVF.


5.  UTI:  U/a w/ LE, mild bacteriuria, in light of fever and comorbidities will 

treat for uncomplicated UTI w/ IV Rocephin.  IVF for hydration. 


1/14 UA negative. DC antibiotics


6.  Seizure Disorder:  Chronic.  Resume home medications.  Ativan prn if 

needed. 


7.  DVT Prophylaxis: SCD/Teds.  Pharmacologic contraindication in light of GI 

Bleed.


8. Neck pain: 


1/14 CT of the cervical spine shows a stable CT scan of the cervical spine 

compared to a previous CT scan on 11/4/17.


Discharge Planning


EGD pending. Still c/o dizziness.











Anmol Bowman MD Jan 14, 2018 13:44

## 2018-01-14 NOTE — RADRPT
EXAM DATE/TIME:  01/14/2018 05:58 

 

[facility] COMPARISON:     

CT CERVICAL SPINE W/O CONTRAST, November 04, 2017, 14:54.

 

 

INDICATIONS :     

Chronic pain.

                      

 

RADIATION DOSE:     

26.31 CTDIvol (mGy) 

 

 

 

MEDICAL HISTORY :     

Seizures. Hypertension. 

 

SURGICAL HISTORY :      

Hysterectomy. Cervical fusion.

 

ENCOUNTER:      

Initial

 

ACUITY:      

1 day

 

PAIN SCALE:      

7/10

 

LOCATION:       

Bilateral neck 

 

TECHNIQUE:     

Volumetric scanning of the cervical spine was performed. Multiplanar reconstructions in the sagittal,
 coronal and oblique axial planes were performed.   Using automated exposure control and adjustment o
f the mA and/or kV according to patient size, radiation dose was kept as low as reasonably achievable
 to obtain optimal diagnostic quality images.   DICOM format image data is available electronically f
or review and comparison.  

 

FINDINGS:     

Today's examination compared to the prior study of 11/4/2017. There has been no significant changes w
ith the appearance of the cervical spine. There continues to be evidence of previous posterior cervic
al-occipital and cervical thoracic fusion down to the level of T2. There continues to be no change in
 the alignment or position of the cervical spine. The hardware is grossly intact. There is stable chr
onic wedge compression of C5. There continues to be kyphosis of the cervical spine without significan
t change. No definite solid bridging is seen at C2-C3. There continues to be bony fusion from C3-C6. 
No significant extra dural defects are seen on the canal. The neural foramina appear to be patent mandy
aterally at the levels imaged.

 

CONCLUSION:     

Stable CT scan of the cervical spine compared to 11/4/2017.

 

 

 

 Manish Watson MD on January 14, 2018 at 6:38           

Board Certified Radiologist.

 This report was verified electronically.

## 2018-01-14 NOTE — HHI.GIFU
GI Follow-up Note


Consult Follow-up





Subjective:  Patient laying in bed comfortably, feeling better .Due to OR 

schedule , egd cannot be done today .No bleeding, hb stable 





Objective:


PHYSICAL EXAMINATION:


Vitals signs stable


No fever





Vital Signs








  Date Time  Temp Pulse Resp B/P (MAP) Pulse Ox O2 Delivery O2 Flow Rate FiO2


 


1/14/18 12:40 98.2 100 20 180/90 (120) 98   


 


1/14/18 09:06 96.8 90 20 177/98 (124) 96   








HEENT: Pupils round and reactive to light; normocephalic; atraumatic; no 

jaundice.  Throat is clear.


NECK: Neck is supple, no JVD, no lymphadenopathy.


CHEST:  Chest is clear to auscultation and percussion.


CARDIAC:  Regular rate and rhythm with no murmur gallop or rubs.


ABDOMEN:  Soft, nondistended, nontender; no hepatosplenomegaly; bowel sounds 

are present in all four quadrants.


EXTREMITIES: No clubbing, cyanosis, or edema.


SKIN:  Normal; no rash; no jaundice.


CNS:  No focal deficits; alert and oriented times three.


Available Data (labs, X- Rays, Procedues) : 








 Laboratory Tests








Test


  1/12/18


17:00 1/12/18


19:06 1/12/18


20:47 1/13/18


03:20


 


Urine Color LIGHT-YELLOW    


 


Urine Turbidity CLEAR    


 


Urine pH 5.5    


 


Urine Specific Gravity 1.005    


 


Urine Protein NEG mg/dL    


 


Urine Glucose (UA) NEG mg/dL    


 


Urine Ketones NEG mg/dL    


 


Urine Occult Blood NEG    


 


Urine Nitrite NEG    


 


Urine Bilirubin NEG    


 


Urine Urobilinogen


  LESS THAN 2.0


MG/DL 


  


  


 


 


Urine Leukocyte Esterase LARGE    


 


Urine RBC 4 /hpf    


 


Urine WBC 3 /hpf    


 


Urine Squamous Epithelial


Cells 5 /hpf 


  


  


  


 


 


Urine Amorphous Sediment RARE    


 


Urine Bacteria FEW /hpf    


 


Microscopic Urinalysis Comment


  CULT NOT


INDICATED 


  


  


 


 


Blood Urea Nitrogen  10 MG/DL   


 


Creatinine  0.86 MG/DL   


 


Random Glucose  81 MG/DL   


 


Total Protein  7.5 GM/DL   


 


Albumin  3.8 GM/DL   


 


Calcium Level  8.9 MG/DL   


 


Magnesium Level  2.0 MG/DL   


 


Alkaline Phosphatase  100 U/L   


 


Aspartate Amino Transf


(AST/SGOT) 


  16 U/L 


  


  


 


 


Alanine Aminotransferase


(ALT/SGPT) 


  15 U/L 


  


  


 


 


Total Bilirubin  0.2 MG/DL   


 


Sodium Level  138 MEQ/L   


 


Potassium Level  4.0 MEQ/L   


 


Chloride Level  109 MEQ/L   


 


Carbon Dioxide Level  18.8 MEQ/L   


 


Anion Gap  10 MEQ/L   


 


Estimat Glomerular Filtration


Rate 


  72 ML/MIN 


  


  


 


 


Total Creatine Kinase  128 U/L   


 


Creatine Kinase MB  1.9 NG/ML   


 


Troponin I


  


  LESS THAN 0.02


NG/ML 


  


 


 


Thyroid Stimulating Hormone


3rd Gen 


  1.980 uIU/ML 


  


  


 


 


White Blood Count   4.6 TH/MM3  


 


Red Blood Count   3.19 MIL/MM3  


 


Hemoglobin   8.0 GM/DL  7.1 GM/DL 


 


Hematocrit   25.8 %  23.0 % 


 


Mean Corpuscular Volume   81.1 FL  


 


Mean Corpuscular Hemoglobin   25.1 PG  


 


Mean Corpuscular Hemoglobin


Concent 


  


  31.0 % 


  


 


 


Red Cell Distribution Width   20.0 %  


 


Platelet Count   307 TH/MM3  


 


Mean Platelet Volume   6.8 FL  


 


Neutrophils (%) (Auto)   54.3 %  


 


Lymphocytes (%) (Auto)   34.4 %  


 


Monocytes (%) (Auto)   5.8 %  


 


Eosinophils (%) (Auto)   4.4 %  


 


Basophils (%) (Auto)   1.1 %  


 


Neutrophils # (Auto)   2.5 TH/MM3  


 


Lymphocytes # (Auto)   1.6 TH/MM3  


 


Monocytes # (Auto)   0.3 TH/MM3  


 


Eosinophils # (Auto)   0.2 TH/MM3  


 


Basophils # (Auto)   0.1 TH/MM3  


 


CBC Comment   DIFF FINAL  


 


Differential Comment     


 


Prothrombin Time   10.7 SEC  


 


Prothromb Time International


Ratio 


  


  1.1 RATIO 


  


 


 


Activated Partial


Thromboplast Time 


  


  22.9 SEC 


  


 


 


Test


  1/13/18


18:32 1/13/18


20:41 1/14/18


10:00 


 


 


White Blood Count 7.4 TH/MM3   6.3 TH/MM3  


 


Red Blood Count 4.01 MIL/MM3   3.97 MIL/MM3  


 


Hemoglobin 10.8 GM/DL  10.4 GM/DL  10.3 GM/DL  


 


Hematocrit 32.9 %  33.1 %  32.6 %  


 


Mean Corpuscular Volume 82.0 FL   82.2 FL  


 


Mean Corpuscular Hemoglobin 26.9 PG   26.0 PG  


 


Mean Corpuscular Hemoglobin


Concent 32.8 % 


  


  31.6 % 


  


 


 


Red Cell Distribution Width 18.7 %   18.9 %  


 


Platelet Count 200 TH/MM3   318 TH/MM3  


 


Mean Platelet Volume 7.7 FL   6.9 FL  


 


Neutrophils (%) (Auto) 57.0 %   61.7 %  


 


Lymphocytes (%) (Auto) 29.4 %   23.9 %  


 


Monocytes (%) (Auto) 7.4 %   9.0 %  


 


Eosinophils (%) (Auto) 5.3 %   4.4 %  


 


Basophils (%) (Auto) 0.9 %   1.0 %  


 


Neutrophils # (Auto) 4.2 TH/MM3   3.9 TH/MM3  


 


Lymphocytes # (Auto) 2.2 TH/MM3   1.5 TH/MM3  


 


Monocytes # (Auto) 0.5 TH/MM3   0.6 TH/MM3  


 


Eosinophils # (Auto) 0.4 TH/MM3   0.3 TH/MM3  


 


Basophils # (Auto) 0.1 TH/MM3   0.1 TH/MM3  


 


CBC Comment AUTO DIFF   DIFF FINAL  


 


Differential Comment


  AUTO DIFF


CONFIRMED 


   


  


 


 


Blood Urea Nitrogen  10 MG/DL  10 MG/DL  


 


Creatinine  0.84 MG/DL  0.82 MG/DL  


 


Random Glucose  67 MG/DL  83 MG/DL  


 


Total Protein  7.2 GM/DL  7.3 GM/DL  


 


Albumin  3.7 GM/DL  3.7 GM/DL  


 


Calcium Level  8.5 MG/DL  8.7 MG/DL  


 


Alkaline Phosphatase  101 U/L  100 U/L  


 


Aspartate Amino Transf


(AST/SGOT) 


  16 U/L 


  17 U/L 


  


 


 


Alanine Aminotransferase


(ALT/SGPT) 


  14 U/L 


  14 U/L 


  


 


 


Total Bilirubin  0.2 MG/DL  0.2 MG/DL  


 


Sodium Level  142 MEQ/L  141 MEQ/L  


 


Potassium Level  4.5 MEQ/L  4.3 MEQ/L  


 


Chloride Level  114 MEQ/L  110 MEQ/L  


 


Carbon Dioxide Level  20.5 MEQ/L  24.2 MEQ/L  


 


Anion Gap  8 MEQ/L  7 MEQ/L  


 


Estimat Glomerular Filtration


Rate 


  74 ML/MIN 


  76 ML/MIN 


  


 


 


Phosphorus Level   3.5 MG/DL  


 


Magnesium Level   1.8 MG/DL  














ASSESSMENT/PLAN:


gi bleeding -hemodynamically stable , no active bleeding 


anemia secondary gi bleeding -s/p prbc 





Recommendations


resume diet 


ppi


egd with dilatation in am 


due to or schedule egd had to be postpone for tomorrow 








It was a pleasure seeing Yrn Tapia. Thank you for this consult.


Entered by: Manisha Quevedo MD Jan 14, 2018 16:55

## 2018-01-15 VITALS
TEMPERATURE: 97.5 F | SYSTOLIC BLOOD PRESSURE: 119 MMHG | HEART RATE: 71 BPM | RESPIRATION RATE: 18 BRPM | DIASTOLIC BLOOD PRESSURE: 66 MMHG | OXYGEN SATURATION: 92 %

## 2018-01-15 VITALS
HEART RATE: 88 BPM | SYSTOLIC BLOOD PRESSURE: 157 MMHG | TEMPERATURE: 98.4 F | DIASTOLIC BLOOD PRESSURE: 103 MMHG | RESPIRATION RATE: 21 BRPM | OXYGEN SATURATION: 94 %

## 2018-01-15 VITALS
RESPIRATION RATE: 18 BRPM | DIASTOLIC BLOOD PRESSURE: 92 MMHG | SYSTOLIC BLOOD PRESSURE: 162 MMHG | HEART RATE: 78 BPM | TEMPERATURE: 98.4 F | OXYGEN SATURATION: 95 %

## 2018-01-15 VITALS
DIASTOLIC BLOOD PRESSURE: 76 MMHG | RESPIRATION RATE: 16 BRPM | OXYGEN SATURATION: 92 % | HEART RATE: 75 BPM | TEMPERATURE: 95.7 F | SYSTOLIC BLOOD PRESSURE: 117 MMHG

## 2018-01-15 VITALS
OXYGEN SATURATION: 93 % | HEART RATE: 82 BPM | TEMPERATURE: 98.1 F | DIASTOLIC BLOOD PRESSURE: 82 MMHG | SYSTOLIC BLOOD PRESSURE: 139 MMHG | RESPIRATION RATE: 20 BRPM

## 2018-01-15 PROCEDURE — 0DB98ZX EXCISION OF DUODENUM, VIA NATURAL OR ARTIFICIAL OPENING ENDOSCOPIC, DIAGNOSTIC: ICD-10-PCS | Performed by: INTERNAL MEDICINE

## 2018-01-15 PROCEDURE — 0DB68ZX EXCISION OF STOMACH, VIA NATURAL OR ARTIFICIAL OPENING ENDOSCOPIC, DIAGNOSTIC: ICD-10-PCS | Performed by: INTERNAL MEDICINE

## 2018-01-15 RX ADMIN — BUTALBITAL, ACETAMINOPHEN, AND CAFFEINE PRN TAB: 50; 325; 40 TABLET ORAL at 08:01

## 2018-01-15 RX ADMIN — GABAPENTIN SCH MG: 400 CAPSULE ORAL at 12:02

## 2018-01-15 RX ADMIN — MECLIZINE HYDROCHLORIDE SCH MG: 25 TABLET ORAL at 22:14

## 2018-01-15 RX ADMIN — ZOLPIDEM TARTRATE PRN MG: 10 TABLET, FILM COATED ORAL at 00:38

## 2018-01-15 RX ADMIN — Medication SCH ML: at 08:01

## 2018-01-15 RX ADMIN — ATENOLOL SCH MG: 50 TABLET ORAL at 22:14

## 2018-01-15 RX ADMIN — MECLIZINE HYDROCHLORIDE SCH MG: 25 TABLET ORAL at 05:48

## 2018-01-15 RX ADMIN — ATENOLOL SCH MG: 50 TABLET ORAL at 08:01

## 2018-01-15 RX ADMIN — PROCHLORPERAZINE EDISYLATE PRN MG: 5 INJECTION INTRAMUSCULAR; INTRAVENOUS at 11:22

## 2018-01-15 RX ADMIN — STANDARDIZED SENNA CONCENTRATE AND DOCUSATE SODIUM SCH TAB: 8.6; 5 TABLET, FILM COATED ORAL at 08:02

## 2018-01-15 RX ADMIN — PANTOPRAZOLE SODIUM SCH MG: 40 INJECTION, POWDER, FOR SOLUTION INTRAVENOUS at 05:47

## 2018-01-15 RX ADMIN — PANTOPRAZOLE SODIUM SCH MG: 40 INJECTION, POWDER, FOR SOLUTION INTRAVENOUS at 18:31

## 2018-01-15 RX ADMIN — BUTALBITAL, ACETAMINOPHEN, AND CAFFEINE PRN TAB: 50; 325; 40 TABLET ORAL at 00:38

## 2018-01-15 RX ADMIN — HYDROCODONE BITARTRATE AND ACETAMINOPHEN PRN TAB: 5; 325 TABLET ORAL at 14:36

## 2018-01-15 RX ADMIN — MORPHINE SULFATE PRN MG: 2 INJECTION, SOLUTION INTRAMUSCULAR; INTRAVENOUS at 06:31

## 2018-01-15 RX ADMIN — Medication SCH ML: at 21:00

## 2018-01-15 RX ADMIN — PROCHLORPERAZINE EDISYLATE PRN MG: 5 INJECTION INTRAMUSCULAR; INTRAVENOUS at 06:30

## 2018-01-15 RX ADMIN — HYDROCODONE BITARTRATE AND ACETAMINOPHEN PRN TAB: 5; 325 TABLET ORAL at 19:13

## 2018-01-15 RX ADMIN — MORPHINE SULFATE PRN MG: 2 INJECTION, SOLUTION INTRAMUSCULAR; INTRAVENOUS at 11:23

## 2018-01-15 RX ADMIN — STANDARDIZED SENNA CONCENTRATE AND DOCUSATE SODIUM SCH TAB: 8.6; 5 TABLET, FILM COATED ORAL at 22:14

## 2018-01-15 RX ADMIN — GABAPENTIN SCH MG: 400 CAPSULE ORAL at 08:03

## 2018-01-15 RX ADMIN — BUTALBITAL, ACETAMINOPHEN, AND CAFFEINE PRN TAB: 50; 325; 40 TABLET ORAL at 13:37

## 2018-01-15 RX ADMIN — GABAPENTIN SCH MG: 400 CAPSULE ORAL at 18:32

## 2018-01-15 RX ADMIN — MECLIZINE HYDROCHLORIDE SCH MG: 25 TABLET ORAL at 13:33

## 2018-01-15 NOTE — PD.PROCEDR
GI Procedure








PROCEDURE PERFORMED


EGD with biopsy





INDICATION FOR PROCEDURE


Melena, anemia





PROCEDURE:


The procedure, risks and benefits were discussed with Ms. Tapia and 

informed


consent was obtained.  Anesthesia sedated her with Diprivan.  She was placed in 

the left lateral decubitus position.





EGD:


The Pentax videoscope was introduced through the oropharynx and advanced to the 

second portion of the duodenum under direct visualization. Retroflexion was 

performed in the stomach.





FINDINGS:


The esophagus this appeared to be unremarkable with normal limits the Z line 

appeared to be regular


The stomach there was a small hiatal hernia also noted still food residue in 

the stomach obscuring some portions of the stomach also noted erythema with a 

superficial ulceration around the pyloric channel with mild limitation to the 

pylorus this was biopsied


The duodenum this appeared to be unremarkable for the most part but biopsies 

were taken to rule out celiac





ESTIMATED BLOOD LOSS:


None





SPECIMENS REMOVED:


Biopsies from the stomach and the duodenum





COMPLICATIONS:


None





IMPRESSION:


Small hiatal hernia


Food residue suggesting gastroparesis versus incomplete emptying


Pyloric ulcer


Possible Duodenitis





PLAN:


Await biopsies


Avoid NSAIDs and aspirin


Continue with PPI therapy Protonix 40 mg twice a day


EGD in 2 months with possible dilation


Mechanical soft diet


Follow-up with GI post discharge











Joseph Mora MD Claudio 15, 2018 10:35

## 2018-01-15 NOTE — HHI.PR
Subjective


Remarks


Patient just came back from EGD with biopsy at noon.


Patient denies cp/sob. 


Seems to be groggy.





Objective


Vitals





Vital Signs








  Date Time  Temp Pulse Resp B/P (MAP) Pulse Ox O2 Delivery O2 Flow Rate FiO2


 


1/15/18 11:49 98.4 88 21 157/103 (121) 94   


 


1/15/18 10:40 98.1 79 16 125/74 (91) 95   





    Manual Cuff/Auscultation    


 


1/15/18 07:37 98.1 82 20 139/82 (101) 93   


 


1/15/18 03:13 98.4 78 18 162/92 (115) 95   


 


1/14/18 23:24 98.4 78 18 148/82 (104) 95   


 


1/14/18 19:46 98.8 84 17 147/89 (108) 96   


 


1/14/18 18:00    168/90 (116)    














I/O      


 


 1/14/18 1/14/18 1/14/18 1/15/18 1/15/18 1/15/18





 07:00 15:00 23:00 07:00 15:00 23:00


 


Intake Total 360 ml   750 ml 400 ml 


 


Output Total 1275 ml     


 


Balance -915 ml   750 ml 400 ml 


 


      


 


Intake Oral 360 ml   750 ml  


 


IV Total     200 ml 


 


Other     200 ml 


 


Output Urine Total 1275 ml     








Result Diagram:  


1/15/18 0825                                                                   

             1/14/18 1000





Imaging





Last Impressions








Cervical Spine CT 1/14/18 0000 Signed





Impressions: 





 Service Date/Time:  Sunday, January 14, 2018 05:58 - CONCLUSION:  Stable CT 

scan 





 of the cervical spine compared to 11/4/2017.     Manish Watson MD 


 


Head CT 1/12/18 1627 Signed





Impressions: 





 Service Date/Time:  Friday, January 12, 2018 17:54 - CONCLUSION:  No acute 





 intracranial findings.     Isac Rey MD 








Objective Remarks


GENERAL: Middle-aged female in no acute distress.


HEENT: PERRLA, EOMI. No scleral icterus or conjunctival pallor. No lid lag or 

facial droop.  There is pain on palpation of posterior neck.


CARDIOVASCULAR: Regular rate and rhythm.  No obvious murmurs to auscultation. 

No chest tenderness to palpation. 


RESPIRATORY: No obvious rhonchi or wheezing. Clear to auscultation. Breath 

sounds equal bilaterally. 


GASTROINTESTINAL: Abdomen soft, mild epigastric tenderness palpation, 

nondistended. BS normal. 


MUSCULOSKELETAL: Extremities without clubbing, cyanosis, or edema. No obvious 

deformities. 


NEUROLOGICAL: Awake, alert and oriented x4. No focal neurologic deficits. 

Moving both upper and lower extremities spontaneously.


Procedures


none


Medications and IVs





Current Medications








 Medications


  (Trade)  Dose


 Ordered  Sig/Joyce


 Route  Start Time


 Stop Time Status Last Admin


 


  (NS Flush)  2 ml  UNSCH  PRN


 IV FLUSH  1/12/18 21:45


     


 


 


  (NS Flush)  2 ml  BID


 IV FLUSH  1/13/18 09:00


    1/15/18 08:01


 


 


  (Tylenol)  650 mg  Q6H  PRN


 PO  1/12/18 21:45


     


 


 


  (Norco  5-325 Mg)  1 tab  Q4H  PRN


 PO  1/12/18 21:45


    1/15/18 14:36


 


 


  (Gloria-Colace)  1 tab  BID


 PO  1/13/18 09:00


    1/15/18 08:02


 


 


  (Milk Of


 Magnesia Liq)  30 ml  Q12H  PRN


 PO  1/12/18 21:45


     


 


 


  (Senokot)  17.2 mg  Q12H  PRN


 PO  1/12/18 21:45


     


 


 


  (Dulcolax Supp)  10 mg  DAILY  PRN


 RECTAL  1/12/18 21:45


     


 


 


  (Lactulose Liq)  30 ml  DAILY  PRN


 PO  1/12/18 21:45


     


 


 


  (Protonix Inj)  40 mg  Q12H


 IV PUSH  1/13/18 06:00


    1/15/18 05:47


 


 


  (Tenormin)  50 mg  BID


 PO  1/13/18 09:00


    1/15/18 08:01


 


 


  (KlonoPIN)  1 mg  TID


 PO  1/13/18 09:00


    1/15/18 12:02


 


 


  (PROzac)  40 mg  DAILY


 PO  1/13/18 09:00


    1/15/18 08:01


 


 


  (Neurontin)  800 mg  TID


 PO  1/13/18 09:00


    1/15/18 12:02


 


 


  (PHENobarbital)  64.8 mg  TID


 PO  1/13/18 09:00


    1/15/18 12:02


 


 


  (Ambien)  10 mg  HS  PRN


 PO  1/12/18 21:45


    1/15/18 00:38


 


 


  (Fioricet


 325-50-40)  2 tab  Q4H  PRN


 PO  1/12/18 22:30


    1/15/18 13:37


 


 


  (Compazine Inj)  5 mg  Q4H  PRN


 IV PUSH  1/12/18 23:45


    1/15/18 11:22


 


 


 Lactated Ringer's  1,000 ml @ 


 30 mls/hr  Q24H PRN


 IV  1/13/18 16:00


 1/16/18 15:59  1/15/18 08:17


 


 


 Sodium Chloride  500 ml @ 


 30 mls/hr  [id number] PRN


 IV  1/13/18 16:00


 1/16/18 15:59   


 


 


  (Betadine 5%


 Antisepsis Kit)  1 applic  ON CALL  PRN


 EACH NARE  1/13/18 16:00


 1/16/18 15:59   


 


 


  (Chlorhexidine


 2% Cloth)  3 pack  ON CALL  PRN


 TOPICAL  1/13/18 16:00


 1/16/18 15:59   


 


 


  (Catapres)  0.2 mg  TID


 PO  1/14/18 18:00


    1/15/18 12:02


 


 


  (Vasotec Inj)  1.25 mg  Q6H  PRN


 IV PUSH  1/14/18 13:45


    1/14/18 16:08


 


 


  (Antivert)  25 mg  Q8HR


 PO  1/14/18 22:00


    1/15/18 13:33


 


 


  (Duoneb Neb)  1 ampule  Q6HR NEB  PRN


 NEB  1/14/18 17:15


     


 











A/P


Problem List:  


(1) GI bleed


ICD Code:  K92.2 - Gastrointestinal hemorrhage, unspecified


(2) Symptomatic anemia


ICD Code:  D64.9 - Anemia, unspecified


Status:  Acute


(3) Dehydration


ICD Code:  E86.0 - Dehydration


(4) Dizziness


ICD Code:  R42 - Dizziness and giddiness


(5) UTI (urinary tract infection)


ICD Code:  N39.0 - Urinary tract infection, site not specified


(6) Seizure disorder


ICD Code:  G40.909 - Epilepsy, unspecified, not intractable, without status 

epilepticus


Assessment and Plan


1.  GI Bleed:  +melena, Hemoccult attempted however not enough specimen for 

testing, in light of symptomatic anemia and melena will proceed w/ treatment 

for presumed GI Bleed.  Protonix IV.  Consult GI for further evaluation, likely 

EGD/Colonoscopy.  Monitor Hgb/Hct. 


1/13 Appreciate GI consultation. for EGD in am. Patient is sp transfusion 1 

unit of PRBC;s qith appropriate hemoglobin response.


1/14 EGD pending.


1/15 status post EGD with findings of a small hiatal hernia, food residue 

suggesting gastroparesis versus incomplete emptying, pyloric ulcer and possible 

duodenitis.  Await biopsies, avoid NSAIDs and aspirin.  Continue with PPI 

therapy with Protonix 40 mg by mouth twice a day.  GI recommends EGD 2 months 

with possible dilation.  Mechanical soft diet.


2.  Anemia:  Symptomatic.  Hgb 8.0, previously 7.9 on 11/5/17.  Type & Screen.  

Check repeat Hgb at midnight, transfuse if <8.  


1/13 sp transfusion of 1 unit of PRBC.


1/15 Hb stable.


3.  Dizziness:  Likely secondary to symptomatic anemia/GI Bleed.  CT Head w/ no 

acute findings, images reviewed by me.  Place on telemetry, monitor vitals.  

Repeat labs in am, replace electrolytes as needed. 


1/13 Dizziness resolved.


1/14 patient still complains of dizziness and describes it as if the room is 

spinning around.  Patient has a history of vertigo.  We'll start the patient on 

oral meclizine.


1/15 Dizziness resolved.


4.  Dehydration:  Resolved after IVF.


5.  UTI:  U/a w/ LE, mild bacteriuria, in light of fever and comorbidities will 

treat for uncomplicated UTI w/ IV Rocephin.  IVF for hydration. 


1/14 UA negative. DC antibiotics


6.  Seizure Disorder:  Chronic.  Resume home medications.  Ativan prn if 

needed. 


7.  DVT Prophylaxis: SCD/Teds.  Pharmacologic contraindication in light of GI 

Bleed.


8. Neck pain: 


1/14 CT of the cervical spine shows a stable CT scan of the cervical spine 

compared to a previous CT scan on 11/4/17.


1/15 Will DC IV Morphine as RN states patient is looking for IV morphine and 

requesting it frequently. CT cervical spine does not show any injury that would 

need IV morphine.  I will keep oral opiate medications.


Discharge Planning


DC Pending PT evaluation.











Anmol Bowman MD Claudio 15, 2018 17:37

## 2018-01-16 VITALS — HEART RATE: 74 BPM | SYSTOLIC BLOOD PRESSURE: 111 MMHG | RESPIRATION RATE: 18 BRPM | DIASTOLIC BLOOD PRESSURE: 55 MMHG

## 2018-01-16 VITALS
SYSTOLIC BLOOD PRESSURE: 134 MMHG | DIASTOLIC BLOOD PRESSURE: 75 MMHG | OXYGEN SATURATION: 94 % | RESPIRATION RATE: 16 BRPM | HEART RATE: 76 BPM | TEMPERATURE: 98.7 F

## 2018-01-16 VITALS
HEART RATE: 70 BPM | DIASTOLIC BLOOD PRESSURE: 76 MMHG | OXYGEN SATURATION: 96 % | SYSTOLIC BLOOD PRESSURE: 135 MMHG | RESPIRATION RATE: 18 BRPM | TEMPERATURE: 98.4 F

## 2018-01-16 VITALS
TEMPERATURE: 97.6 F | DIASTOLIC BLOOD PRESSURE: 71 MMHG | RESPIRATION RATE: 18 BRPM | SYSTOLIC BLOOD PRESSURE: 115 MMHG | HEART RATE: 77 BPM | OXYGEN SATURATION: 95 %

## 2018-01-16 VITALS
TEMPERATURE: 97.9 F | DIASTOLIC BLOOD PRESSURE: 75 MMHG | OXYGEN SATURATION: 94 % | RESPIRATION RATE: 18 BRPM | SYSTOLIC BLOOD PRESSURE: 123 MMHG | HEART RATE: 74 BPM

## 2018-01-16 LAB
ERYTHROCYTE [DISTWIDTH] IN BLOOD BY AUTOMATED COUNT: 19.6 % (ref 11.6–17.2)
HCT VFR BLD CALC: 36.8 % (ref 35–46)
HGB BLD-MCNC: 11.8 GM/DL (ref 11.6–15.3)
MCH RBC QN AUTO: 26.7 PG (ref 27–34)
MCHC RBC AUTO-ENTMCNC: 31.9 % (ref 32–36)
MCV RBC AUTO: 83.7 FL (ref 80–100)
PLATELET # BLD: 295 TH/MM3 (ref 150–450)
PMV BLD AUTO: 6.9 FL (ref 7–11)
RBC # BLD AUTO: 4.4 MIL/MM3 (ref 4–5.3)
WBC # BLD AUTO: 7.2 TH/MM3 (ref 4–11)

## 2018-01-16 RX ADMIN — Medication SCH ML: at 08:22

## 2018-01-16 RX ADMIN — MECLIZINE HYDROCHLORIDE SCH MG: 25 TABLET ORAL at 14:47

## 2018-01-16 RX ADMIN — PANTOPRAZOLE SODIUM SCH MG: 40 INJECTION, POWDER, FOR SOLUTION INTRAVENOUS at 17:52

## 2018-01-16 RX ADMIN — HYDROCODONE BITARTRATE AND ACETAMINOPHEN PRN TAB: 5; 325 TABLET ORAL at 09:54

## 2018-01-16 RX ADMIN — HYDROCODONE BITARTRATE AND ACETAMINOPHEN PRN TAB: 5; 325 TABLET ORAL at 19:29

## 2018-01-16 RX ADMIN — MECLIZINE HYDROCHLORIDE SCH MG: 25 TABLET ORAL at 20:39

## 2018-01-16 RX ADMIN — ATENOLOL SCH MG: 50 TABLET ORAL at 08:23

## 2018-01-16 RX ADMIN — METOCLOPRAMIDE HYDROCHLORIDE SCH MG: 10 TABLET ORAL at 17:55

## 2018-01-16 RX ADMIN — GABAPENTIN SCH MG: 400 CAPSULE ORAL at 08:23

## 2018-01-16 RX ADMIN — BUTALBITAL, ACETAMINOPHEN, AND CAFFEINE PRN TAB: 50; 325; 40 TABLET ORAL at 17:55

## 2018-01-16 RX ADMIN — GABAPENTIN SCH MG: 400 CAPSULE ORAL at 17:52

## 2018-01-16 RX ADMIN — ATENOLOL SCH MG: 50 TABLET ORAL at 20:39

## 2018-01-16 RX ADMIN — GABAPENTIN SCH MG: 400 CAPSULE ORAL at 14:47

## 2018-01-16 RX ADMIN — BUTALBITAL, ACETAMINOPHEN, AND CAFFEINE PRN TAB: 50; 325; 40 TABLET ORAL at 08:23

## 2018-01-16 RX ADMIN — PANTOPRAZOLE SODIUM SCH MG: 40 INJECTION, POWDER, FOR SOLUTION INTRAVENOUS at 05:31

## 2018-01-16 RX ADMIN — STANDARDIZED SENNA CONCENTRATE AND DOCUSATE SODIUM SCH TAB: 8.6; 5 TABLET, FILM COATED ORAL at 08:23

## 2018-01-16 RX ADMIN — MECLIZINE HYDROCHLORIDE SCH MG: 25 TABLET ORAL at 05:30

## 2018-01-16 RX ADMIN — HYDROCODONE BITARTRATE AND ACETAMINOPHEN PRN TAB: 5; 325 TABLET ORAL at 14:48

## 2018-01-16 RX ADMIN — STANDARDIZED SENNA CONCENTRATE AND DOCUSATE SODIUM SCH TAB: 8.6; 5 TABLET, FILM COATED ORAL at 20:41

## 2018-01-16 RX ADMIN — Medication SCH ML: at 20:39

## 2018-01-16 RX ADMIN — METOCLOPRAMIDE HYDROCHLORIDE SCH MG: 10 TABLET ORAL at 20:39

## 2018-01-16 NOTE — HHI.PR
Subjective


Remarks


Patient sleeping.


Denies cp/sob.


Denies nausea or vomiting.





Objective


Vitals





Vital Signs








  Date Time  Temp Pulse Resp B/P (MAP) Pulse Ox O2 Delivery O2 Flow Rate FiO2


 


1/16/18 11:22 98.4 70 18 135/76 (95) 96   


 


1/16/18 07:44 98.7 76 16 134/75 (94) 94   


 


1/16/18 03:19  74 18 111/55 (73)    


 


1/15/18 20:40 97.5 71 18 119/66 (83) 92   


 


1/15/18 18:28 95.7 75 16 117/76 (90) 92   














I/O      


 


 1/15/18 1/15/18 1/15/18 1/16/18 1/16/18 1/16/18





 07:00 15:00 23:00 07:00 15:00 23:00


 


Intake Total 750 ml 400 ml    


 


Balance 750 ml 400 ml    


 


      


 


Intake Oral 750 ml     


 


IV Total  200 ml    


 


Other  200 ml    








Result Diagram:  


1/16/18 1500                                                                   

             1/14/18 1000





Imaging





Last Impressions








Gastric Emptying Nuclear Medicine 1/16/18 0000 Signed





Impressions: 





 Service Date/Time:  Tuesday, January 16, 2018 12:59 - CONCLUSION: Severely 





 impaired gastric emptying     Dash Mcguire MD 


 


Cervical Spine CT 1/14/18 0000 Signed





Impressions: 





 Service Date/Time:  Sunday, January 14, 2018 05:58 - CONCLUSION:  Stable CT 

scan 





 of the cervical spine compared to 11/4/2017.     Manish Watson MD 


 


Head CT 1/12/18 1627 Signed





Impressions: 





 Service Date/Time:  Friday, January 12, 2018 17:54 - CONCLUSION:  No acute 





 intracranial findings.     Isac Rey MD 








Objective Remarks


GENERAL: Middle-aged female in no acute distress.


HEENT: PERRLA, EOMI. No scleral icterus or conjunctival pallor. No lid lag or 

facial droop.  There is pain on palpation of posterior neck.


CARDIOVASCULAR: Regular rate and rhythm.  No obvious murmurs to auscultation. 

No chest tenderness to palpation. 


RESPIRATORY: No obvious rhonchi or wheezing. Clear to auscultation. Breath 

sounds equal bilaterally. 


GASTROINTESTINAL: Abdomen soft, mild epigastric tenderness palpation, 

nondistended. BS normal. 


MUSCULOSKELETAL: Extremities without clubbing, cyanosis, or edema. No obvious 

deformities. 


NEUROLOGICAL: Awake, alert and oriented x4. No focal neurologic deficits. 

Moving both upper and lower extremities spontaneously.


Procedures


none


Medications and IVs





Current Medications








 Medications


  (Trade)  Dose


 Ordered  Sig/Joyce


 Route  Start Time


 Stop Time Status Last Admin


 


  (NS Flush)  2 ml  UNSCH  PRN


 IV FLUSH  1/12/18 21:45


     


 


 


  (NS Flush)  2 ml  BID


 IV FLUSH  1/13/18 09:00


    1/16/18 08:22


 


 


  (Tylenol)  650 mg  Q6H  PRN


 PO  1/12/18 21:45


     


 


 


  (Norco  5-325 Mg)  1 tab  Q4H  PRN


 PO  1/12/18 21:45


    1/16/18 14:48


 


 


  (Gloria-Colace)  1 tab  BID


 PO  1/13/18 09:00


    1/16/18 08:23


 


 


  (Milk Of


 Magnesia Liq)  30 ml  Q12H  PRN


 PO  1/12/18 21:45


     


 


 


  (Senokot)  17.2 mg  Q12H  PRN


 PO  1/12/18 21:45


     


 


 


  (Dulcolax Supp)  10 mg  DAILY  PRN


 RECTAL  1/12/18 21:45


     


 


 


  (Lactulose Liq)  30 ml  DAILY  PRN


 PO  1/12/18 21:45


     


 


 


  (Protonix Inj)  40 mg  Q12H


 IV PUSH  1/13/18 06:00


    1/16/18 05:31


 


 


  (Tenormin)  50 mg  BID


 PO  1/13/18 09:00


    1/16/18 08:23


 


 


  (KlonoPIN)  1 mg  TID


 PO  1/13/18 09:00


    1/16/18 14:47


 


 


  (PROzac)  40 mg  DAILY


 PO  1/13/18 09:00


    1/16/18 08:23


 


 


  (Neurontin)  800 mg  TID


 PO  1/13/18 09:00


    1/16/18 14:47


 


 


  (PHENobarbital)  64.8 mg  TID


 PO  1/13/18 09:00


    1/16/18 13:00


 


 


  (Ambien)  10 mg  HS  PRN


 PO  1/12/18 21:45


    1/15/18 00:38


 


 


  (Fioricet


 325-50-40)  2 tab  Q4H  PRN


 PO  1/12/18 22:30


    1/16/18 08:23


 


 


  (Compazine Inj)  5 mg  Q4H  PRN


 IV PUSH  1/12/18 23:45


    1/15/18 11:22


 


 


  (Catapres)  0.2 mg  TID


 PO  1/14/18 18:00


    1/16/18 14:47


 


 


  (Vasotec Inj)  1.25 mg  Q6H  PRN


 IV PUSH  1/14/18 13:45


    1/14/18 16:08


 


 


  (Antivert)  25 mg  Q8HR


 PO  1/14/18 22:00


    1/16/18 14:47


 


 


  (Duoneb Neb)  1 ampule  Q6HR NEB  PRN


 NEB  1/14/18 17:15


     


 


 


  (Reglan)  5 mg  QID


 PO  1/16/18 18:00


     


 











A/P


Problem List:  


(1) GI bleed


ICD Code:  K92.2 - Gastrointestinal hemorrhage, unspecified


(2) Symptomatic anemia


ICD Code:  D64.9 - Anemia, unspecified


Status:  Acute


(3) Dehydration


ICD Code:  E86.0 - Dehydration


(4) Dizziness


ICD Code:  R42 - Dizziness and giddiness


(5) UTI (urinary tract infection)


ICD Code:  N39.0 - Urinary tract infection, site not specified


(6) Seizure disorder


ICD Code:  G40.909 - Epilepsy, unspecified, not intractable, without status 

epilepticus


(7) Gastroparesis


ICD Code:  K31.84 - Gastroparesis


Plan:  Gastric emptying study ordered by gastroenterology.  S-shaped emptying 

study showed severely impaired gastric emptying.  This could be medication 

induced secondary to narcotic pain medication use.  Avoid narcotic pain 

medications.  Will start the patient on Reglan 5 mg by mouth 3 times a day.  

Case was discussed with gastroenterology midlevel.





Assessment and Plan


1.  GI Bleed:  +melena, Hemoccult attempted however not enough specimen for 

testing, in light of symptomatic anemia and melena will proceed w/ treatment 

for presumed GI Bleed.  Protonix IV.  Consult GI for further evaluation, likely 

EGD/Colonoscopy.  Monitor Hgb/Hct. 


1/13 Appreciate GI consultation. for EGD in am. Patient is sp transfusion 1 

unit of PRBC;s qith appropriate hemoglobin response.


1/14 EGD pending.


1/15 status post EGD with findings of a small hiatal hernia, food residue 

suggesting gastroparesis versus incomplete emptying, pyloric ulcer and possible 

duodenitis.  Await biopsies, avoid NSAIDs and aspirin.  Continue with PPI 

therapy with Protonix 40 mg by mouth twice a day.  GI recommends EGD 2 months 

with possible dilation.  Mechanical soft diet.


2.  Anemia:  Symptomatic.  Hgb 8.0, previously 7.9 on 11/5/17.  Type & Screen.  

Check repeat Hgb at midnight, transfuse if <8.  


1/13 sp transfusion of 1 unit of PRBC.


1/15 Hb stable.


3.  Dizziness:  Likely secondary to symptomatic anemia/GI Bleed.  CT Head w/ no 

acute findings, images reviewed by me.  Place on telemetry, monitor vitals.  

Repeat labs in am, replace electrolytes as needed. 


1/13 Dizziness resolved.


1/14 patient still complains of dizziness and describes it as if the room is 

spinning around.  Patient has a history of vertigo.  We'll start the patient on 

oral meclizine.


1/15 Dizziness resolved.


4.  Dehydration:  Resolved after IVF.


5.  UTI:  U/a w/ LE, mild bacteriuria, in light of fever and comorbidities will 

treat for uncomplicated UTI w/ IV Rocephin.  IVF for hydration. 


1/14 UA negative. DC antibiotics


6.  Seizure Disorder:  Chronic.  Resume home medications.  Ativan prn if 

needed. 


7.  DVT Prophylaxis: SCD/Teds.  Pharmacologic contraindication in light of GI 

Bleed.


8. Neck pain: 


1/14 CT of the cervical spine shows a stable CT scan of the cervical spine 

compared to a previous CT scan on 11/4/17.


1/15 Will DC IV Morphine as RN states patient is looking for IV morphine and 

requesting it frequently. CT cervical spine does not show any injury that would 

need IV morphine.  I will keep oral opiate medications.


Discharge Planning


DC Pending PT evaluation.











Anmol Bowman MD Jan 16, 2018 16:35

## 2018-01-16 NOTE — HHI.GIFU
Subjective


Remarks


Patient drowsy and answered simple questions yes or no


Continues to have symptoms of nausea and some mild dull gastric pain, states 

onset approximately 2 weeks ago


Mild obesity


Denies any diarrhea or constipation


 (Lisa Gusman)





Objective


Vitals I&O





Vital Signs








  Date Time  Temp Pulse Resp B/P (MAP) Pulse Ox O2 Delivery O2 Flow Rate FiO2


 


1/16/18 07:44 98.7 76 16 134/75 (94) 94   


 


1/16/18 03:19  74 18 111/55 (73)    


 


1/15/18 20:40 97.5 71 18 119/66 (83) 92   


 


1/15/18 18:28 95.7 75 16 117/76 (90) 92   


 


1/15/18 11:49 98.4 88 21 157/103 (121) 94   


 


1/15/18 10:40 98.1 79 16 125/74 (91) 95   





    Manual Cuff/Auscultation    














I/O      


 


 1/15/18 1/15/18 1/15/18 1/16/18 1/16/18 1/16/18





 07:00 15:00 23:00 07:00 15:00 23:00


 


Intake Total 750 ml 400 ml    


 


Balance 750 ml 400 ml    


 


      


 


Intake Oral 750 ml     


 


IV Total  200 ml    


 


Other  200 ml    








Laboratory











 Date/Time


Source Procedure


Growth Status


 


 


 1/12/18 17:00


Nasal Washing Influenza Types A,B Antigen (ANA) - Final


NEGATIVE FOR FLU A AND B ANTIGEN.... Complete








Imaging





Last Impressions








Cervical Spine CT 1/14/18 0000 Signed





Impressions: 





 Service Date/Time:  Sunday, January 14, 2018 05:58 - CONCLUSION:  Stable CT 

scan 





 of the cervical spine compared to 11/4/2017.     Manish Watson MD 


 


Head CT 1/12/18 1627 Signed





Impressions: 





 Service Date/Time:  Friday, January 12, 2018 17:54 - CONCLUSION:  No acute 





 intracranial findings.     Isac Rey MD 








Physical Exam


HEENT: Pupils round and reactive to light; normocephalic; atraumatic; no 

jaundice.  


NECK: Neck is supple, no JVD, no lymphadenopathy.


CHEST:  Chest is clear to auscultation and percussion.


CARDIAC:  Regular rate and rhythm with no murmur gallop or rubs.


ABDOMEN:  Soft, nondistended, mild dull gastric discomfort bowel sounds soft


EXTREMITIES: No clubbing, cyanosis, or edema, mild obesity.


SKIN:  Normal; no rash; no jaundice.


CNS:  No focal deficits; alert and oriented times three.


 (Lisa Gusman)





Assessment and Plan


Plan


Assessment/History


- Melena- Pt reports black stools for the past two weeks.  Has also been 

vomiting, states the vomit has been dark.


  Denies history of GIB, NSAID use, ETOH.  Does smoke 5 cigarettes a day.  No 

previous hx, EGD or colonoscopy.


  H/H 7.1/23- 2 U PRBCs have been ordered- one is currently transfusing. 


-  HGB stable at 10. 4 


- Dysphagia with solids, feels like food gets stuck


- Dizziness over the past two weeks- most likely secondary to severe anemia- CT 

head in ER negative.  Pt does


  have history of seizure disorder- takes Phenobarbital 


- S/P EGD 1/15, Small hiatal hernia, Food residue suggesting gastroparesis 

versus incomplete emptying, Pyloric ulcer


  possible Duodenitis





Plan:


- Diet heart healthy


- Gastric emptying study today, consider Reglan


- Transfuse as needed , Call for any acute bleeding.


- Monitor Hgb/Hct


- Continue Protonix, will transition to by mouth


- Supportive care


- Encourage physical activity getting up out of bed, this goal therapy 

currently in room to work with her





Pt has been seen and examined by myself and Dr. Mora and this note is 

written on her behalf 








 (Lisa Gusman)


Physician Comments


Patient seen and examined


Agree with above


Continue with current supportive care


Monitor labs


Gastric emptying severely impaired consideration can be made for erythromycin 

250 mg half hour before meals or as needed


 (Joseph Mora MD)











Lisa Gusman Jan 16, 2018 08:53


Joseph Mora MD Jan 17, 2018 01:24

## 2018-01-16 NOTE — RADRPT
EXAM DATE/TIME:  2018 12:59 

 

[facility] COMPARISON:     

No previous studies available for comparison.

 

 

INDICATIONS :     

*** Abdominal pain with nausea and vomiting.

                       

 

DOSE:      

1.1 mCi Tc99m Sulfur Colloid Labeled Whole egg PO 

                       

                       

 

IMAGIN hr 

                       

                       

 

MEDICAL HISTORY :     

Hypertension. Chronic obstructive pulmonary disease. Seizures. 

 

SURGICAL HISTORY :      

Fusion, cervical.  Hysterectomy.  

 

ENCOUNTER:     

Initial

 

ACUITY:     

3 days

 

PAIN SCALE:     

6/10

 

LOCATION:        

Abdomen.

 

TECHNIQUE:     

Following the oral ingestion of radiotracer-labeled meal, dynamic sequential images in the KATHRYN projec
tion were acquired with simultaneous computer acquisition.  The data set was decay-corrected.

 

FINDINGS:     

There is virtually no gastric emptying during the course of 60 minutes of scanning beyond which the p
atient refused to continue the exam.

 

CONCLUSION:     Severely impaired gastric emptying

 

 

 

 Dash Mcguire MD on 2018 at 14:41           

Board Certified Radiologist.

 This report was verified electronically.

## 2018-01-17 VITALS
OXYGEN SATURATION: 97 % | DIASTOLIC BLOOD PRESSURE: 85 MMHG | TEMPERATURE: 97.7 F | RESPIRATION RATE: 18 BRPM | HEART RATE: 72 BPM | SYSTOLIC BLOOD PRESSURE: 130 MMHG

## 2018-01-17 VITALS
DIASTOLIC BLOOD PRESSURE: 73 MMHG | TEMPERATURE: 98 F | OXYGEN SATURATION: 94 % | RESPIRATION RATE: 19 BRPM | HEART RATE: 76 BPM | SYSTOLIC BLOOD PRESSURE: 123 MMHG

## 2018-01-17 VITALS
RESPIRATION RATE: 18 BRPM | DIASTOLIC BLOOD PRESSURE: 71 MMHG | HEART RATE: 64 BPM | TEMPERATURE: 97.6 F | SYSTOLIC BLOOD PRESSURE: 124 MMHG | OXYGEN SATURATION: 96 %

## 2018-01-17 VITALS
DIASTOLIC BLOOD PRESSURE: 81 MMHG | OXYGEN SATURATION: 95 % | RESPIRATION RATE: 18 BRPM | HEART RATE: 81 BPM | SYSTOLIC BLOOD PRESSURE: 135 MMHG | TEMPERATURE: 98 F

## 2018-01-17 VITALS
OXYGEN SATURATION: 97 % | DIASTOLIC BLOOD PRESSURE: 54 MMHG | HEART RATE: 76 BPM | RESPIRATION RATE: 20 BRPM | TEMPERATURE: 97.9 F | SYSTOLIC BLOOD PRESSURE: 114 MMHG

## 2018-01-17 VITALS
TEMPERATURE: 98 F | OXYGEN SATURATION: 95 % | SYSTOLIC BLOOD PRESSURE: 131 MMHG | DIASTOLIC BLOOD PRESSURE: 71 MMHG | HEART RATE: 83 BPM | RESPIRATION RATE: 16 BRPM

## 2018-01-17 RX ADMIN — HYDROCODONE BITARTRATE AND ACETAMINOPHEN PRN TAB: 5; 325 TABLET ORAL at 00:13

## 2018-01-17 RX ADMIN — ATENOLOL SCH MG: 50 TABLET ORAL at 23:49

## 2018-01-17 RX ADMIN — BUTALBITAL, ACETAMINOPHEN, AND CAFFEINE PRN TAB: 50; 325; 40 TABLET ORAL at 05:25

## 2018-01-17 RX ADMIN — PANTOPRAZOLE SODIUM SCH MG: 40 INJECTION, POWDER, FOR SOLUTION INTRAVENOUS at 18:26

## 2018-01-17 RX ADMIN — METOCLOPRAMIDE HYDROCHLORIDE SCH MG: 10 TABLET ORAL at 23:50

## 2018-01-17 RX ADMIN — GABAPENTIN SCH MG: 400 CAPSULE ORAL at 09:50

## 2018-01-17 RX ADMIN — ATENOLOL SCH MG: 50 TABLET ORAL at 09:50

## 2018-01-17 RX ADMIN — Medication SCH ML: at 09:53

## 2018-01-17 RX ADMIN — STANDARDIZED SENNA CONCENTRATE AND DOCUSATE SODIUM SCH TAB: 8.6; 5 TABLET, FILM COATED ORAL at 21:00

## 2018-01-17 RX ADMIN — Medication SCH ML: at 23:51

## 2018-01-17 RX ADMIN — METOCLOPRAMIDE HYDROCHLORIDE SCH MG: 10 TABLET ORAL at 18:24

## 2018-01-17 RX ADMIN — METOCLOPRAMIDE HYDROCHLORIDE SCH MG: 10 TABLET ORAL at 09:51

## 2018-01-17 RX ADMIN — GABAPENTIN SCH MG: 400 CAPSULE ORAL at 13:28

## 2018-01-17 RX ADMIN — MECLIZINE HYDROCHLORIDE SCH MG: 25 TABLET ORAL at 14:00

## 2018-01-17 RX ADMIN — PANTOPRAZOLE SODIUM SCH MG: 40 INJECTION, POWDER, FOR SOLUTION INTRAVENOUS at 05:24

## 2018-01-17 RX ADMIN — HYDROCODONE BITARTRATE AND ACETAMINOPHEN PRN TAB: 5; 325 TABLET ORAL at 09:50

## 2018-01-17 RX ADMIN — METOCLOPRAMIDE HYDROCHLORIDE SCH MG: 10 TABLET ORAL at 13:28

## 2018-01-17 RX ADMIN — GABAPENTIN SCH MG: 400 CAPSULE ORAL at 18:23

## 2018-01-17 RX ADMIN — BUTALBITAL, ACETAMINOPHEN, AND CAFFEINE PRN TAB: 50; 325; 40 TABLET ORAL at 11:59

## 2018-01-17 RX ADMIN — MECLIZINE HYDROCHLORIDE SCH MG: 25 TABLET ORAL at 05:25

## 2018-01-17 RX ADMIN — STANDARDIZED SENNA CONCENTRATE AND DOCUSATE SODIUM SCH TAB: 8.6; 5 TABLET, FILM COATED ORAL at 09:51

## 2018-01-17 NOTE — RADRPT
EXAM DATE/TIME:  2018 11:13 

 

[facility] COMPARISON:     

No previous studies available for comparison.

        

 

 

INDICATIONS :     

Abdominal pain. 

                     

 

MEDICAL HISTORY :     

Arthritis.     Seizures. Migraines. Tachycardia arrytmia. HTN. COPD. Ulcer. PTSD. Anxiety. 

 

SURGICAL HISTORY :     

 section. Hysterectomy.   Cervical fusion. Blood transfusions. 

 

ENCOUNTER:     

Subsequent

 

ACUITY:     

2 days

 

PAIN SCORE:     

5/10

 

LOCATION:         

Abdomen. 

                     

MEASUREMENTS:     

 

LIVER:     

16.6 cm length 

 

COMMON DUCT:     

4 mm

 

RIGHT KIDNEY:     

12.0 x 6.0 x 5.4 cm

 

LEFT KIDNEY:     

12.2.x 4.9 x 5.0 cm

 

SPLEEN:     

11.2 cm length

 

AORTA:     

1.7cm maximal      

 

FINDINGS:     

 

LIVER:     

Normal echotexture without focal lesion or ductal dilatation.  

 

COMMON DUCT:     

No intraluminal mass or stone visualized.  

 

GALLBLADDER:     

Contains no stones, demonstrates no wall thickening or pericholecystic fluid.  

 

PANCREAS:     

Mostly obscured due to bowel gas.  

 

RIGHT KIDNEY:     

No hydronephrosis, stone or mass.  

 

LEFT KIDNEY:     

No hydronephrosis, stone or mass.  

 

SPLEEN:     

No focal lesion.  

 

AORTA:     

Mostly obscured due to bowel gas.  

 

IVC:     

Within normal limits.  

 

CONCLUSION:     

Significant amount of bowel gas limiting the exam. Solid organs are unremarkable as visualized.

 

 

 

 Jose Alejandro Ramirez MD on 2018 at 11:45           

Board Certified Radiologist.

 This report was verified electronically.

## 2018-01-17 NOTE — HHI.GIFU
Subjective


Remarks


drowsy, but arouses to voice


States that she had black tarry stool this morning


Abdominal pain complaints epigastric and right lower quadrant


Hemoglobin stable at 11.8


 (Lisa Gusman)





Objective


Vitals I&O





Vital Signs








  Date Time  Temp Pulse Resp B/P (MAP) Pulse Ox O2 Delivery O2 Flow Rate FiO2


 


1/17/18 07:23 98.0 83 16 131/71 (91) 95   


 


1/17/18 04:43 98.0 76 19 123/73 (90) 94   


 


1/17/18 00:00 97.9 76 20 114/54 (74) 97   


 


1/16/18 21:25 97.6 77 18 115/71 (86) 95   


 


1/16/18 17:04 97.9 74 18 123/75 (91) 94   


 


1/16/18 11:22 98.4 70 18 135/76 (95) 96   








Laboratory





Laboratory Tests








Test


  1/16/18


15:00


 


White Blood Count 7.2 


 


Red Blood Count 4.40 


 


Hemoglobin 11.8 


 


Hematocrit 36.8 


 


Mean Corpuscular Volume 83.7 


 


Mean Corpuscular Hemoglobin 26.7 


 


Mean Corpuscular Hemoglobin


Concent 31.9 


 


 


Red Cell Distribution Width 19.6 


 


Platelet Count 295 


 


Mean Platelet Volume 6.9 














 Date/Time


Source Procedure


Growth Status


 


 


 1/12/18 17:00


Nasal Washing Influenza Types A,B Antigen (ANA) - Final


NEGATIVE FOR FLU A AND B ANTIGEN.... Complete








Imaging





Last Impressions








Gastric Emptying Nuclear Medicine 1/16/18 0000 Signed





Impressions: 





 Service Date/Time:  Tuesday, January 16, 2018 12:59 - CONCLUSION: Severely 





 impaired gastric emptying     Dash Mcguire MD 


 


Cervical Spine CT 1/14/18 0000 Signed





Impressions: 





 Service Date/Time:  Sunday, January 14, 2018 05:58 - CONCLUSION:  Stable CT 

scan 





 of the cervical spine compared to 11/4/2017.     Manish Watson MD 


 


Head CT 1/12/18 1627 Signed





Impressions: 





 Service Date/Time:  Friday, January 12, 2018 17:54 - CONCLUSION:  No acute 





 intracranial findings.     Isac Rey MD 








Physical Exam


HEENT: Pupils round and reactive to light; normocephalic; atraumatic; no 

jaundice.  


NECK: Neck is supple, no JVD, no lymphadenopathy.


CHEST:  Chest is clear to auscultation and percussion.


CARDIAC:  Regular rate and rhythm with no murmur gallop or rubs.


ABDOMEN:  Soft,round, mild bloating,  bowel sounds soft, epigastric and right 

lower quadrant abdominal pain complaints


EXTREMITIES: No clubbing, cyanosis, or edema, mild obesity.


SKIN:  Normal; no rash; no jaundice.


CNS: See but arouses to verbal stimuli


 (Lisa Gusman)





Assessment and Plan


Assessment:  


(1) Gastroparesis


ICD Codes:  K31.84 - Gastroparesis


(2) Symptomatic anemia


ICD Codes:  D64.9 - Anemia, unspecified


Status:  Acute


Plan


Assessment/History


- GI bleeding received blood transfusion this admission


-  HGB stable at 11.8


- Dysphagia with solids, feels like food gets stuck


- Right lower quadrant pain and epigastric pain continues, patient states black 

tarry stools this a.m.


- S/P EGD 1/15, Small hiatal hernia, Food residue suggesting gastroparesis 

versus incomplete emptying, Pyloric ulcer


  possible Duodenitis


-Gastroparesis





Plan:


-Continues with RLQ pain and epigastric pain, Abd US today.


- Diet heart healthy


- Gastroparesis seen on gastric emptying scan, erythromycin 250 mg 3 times a 

day with meals


-Hemoccult stools today and tomorrow


- Transfuse as needed , Call for any acute bleeding.


- Monitor Hgb/Hct


- Continue Protonix, will transition to by mouth


- Supportive care


- Encourage physical activity getting up out of bed





Pt has been seen and examined by myself and Dr. Mora and this note is 

written on her behalf 


 (Lisa Gusman)


Physician Comments


Patient seen and examined


Agree with above


Continue with current supportive care


Monitor labs


Recommend chopped diet or pured diet


Patient follow-up post discharge with GI


Continue PPI


 (Joseph Mora MD)











Lisa Gusman Jan 17, 2018 10:36


Joseph Mora MD Jan 18, 2018 00:52

## 2018-01-17 NOTE — HHI.PR
Subjective


Remarks


Boyfriend at bedside states that the patient was not on certaim medications at 

home like klonapin.


Patient states had melanotic stool however had BM with brown stool observed by 

me.


Patient c/o abdominal pain and is demanding pain medication.


Patient had slurred and garbled speech.





Objective


Vitals





Vital Signs








  Date Time  Temp Pulse Resp B/P (MAP) Pulse Ox O2 Delivery O2 Flow Rate FiO2


 


1/17/18 15:45 97.7 72 18 130/85 (100) 97   


 


1/17/18 11:18 98.0 81 18 135/81 (99) 95   


 


1/17/18 07:23 98.0 83 16 131/71 (91) 95   


 


1/17/18 04:43 98.0 76 19 123/73 (90) 94   


 


1/17/18 00:00 97.9 76 20 114/54 (74) 97   


 


1/16/18 21:25 97.6 77 18 115/71 (86) 95   








Result Diagram:  


1/16/18 1500                                                                   

             1/14/18 1000





Imaging





Last Impressions








Abdomen Ultrasound 1/17/18 0000 Signed





Impressions: 





 Service Date/Time:  Wednesday, January 17, 2018 11:13 - CONCLUSION:  

Significant 





 amount of bowel gas limiting the exam. Solid organs are unremarkable as 





 visualized.     Jose Alejandro Ramirez MD 


 


Gastric Emptying Nuclear Medicine 1/16/18 0000 Signed





Impressions: 





 Service Date/Time:  Tuesday, January 16, 2018 12:59 - CONCLUSION: Severely 





 impaired gastric emptying     Dash Mcguire MD 


 


Cervical Spine CT 1/14/18 0000 Signed





Impressions: 





 Service Date/Time:  Sunday, January 14, 2018 05:58 - CONCLUSION:  Stable CT 

scan 





 of the cervical spine compared to 11/4/2017.     Manish Watson MD 


 


Head CT 1/12/18 1627 Signed





Impressions: 





 Service Date/Time:  Friday, January 12, 2018 17:54 - CONCLUSION:  No acute 





 intracranial findings.     Isac Rey MD 








Objective Remarks


GENERAL: Middle-aged female in no acute distress.


HEENT: PERRLA, EOMI. No scleral icterus or conjunctival pallor. No lid lag or 

facial droop.  There is pain on palpation of posterior neck.


CARDIOVASCULAR: Regular rate and rhythm.  No obvious murmurs to auscultation. 

No chest tenderness to palpation. 


RESPIRATORY: No obvious rhonchi or wheezing. Clear to auscultation. Breath 

sounds equal bilaterally. 


GASTROINTESTINAL: Abdomen soft, mild epigastric tenderness palpation, 

nondistended. BS normal. 


MUSCULOSKELETAL: Extremities without clubbing, cyanosis, or edema. No obvious 

deformities. 


NEUROLOGICAL: Awake, alert with slurred speech. No focal neurologic deficits. 

Moving both upper and lower extremities spontaneously.


Procedures


none





A/P


Problem List:  


(1) GI bleed


ICD Code:  K92.2 - Gastrointestinal hemorrhage, unspecified


(2) Symptomatic anemia


ICD Code:  D64.9 - Anemia, unspecified


Status:  Acute


(3) Dehydration


ICD Code:  E86.0 - Dehydration


(4) Dizziness


ICD Code:  R42 - Dizziness and giddiness


(5) UTI (urinary tract infection)


ICD Code:  N39.0 - Urinary tract infection, site not specified


(6) Seizure disorder


ICD Code:  G40.909 - Epilepsy, unspecified, not intractable, without status 

epilepticus


(7) Gastroparesis


ICD Code:  K31.84 - Gastroparesis


Plan:  Gastric emptying study ordered by gastroenterology.  S-shaped emptying 

study showed severely impaired gastric emptying.  This could be medication 

induced secondary to narcotic pain medication use.  Avoid narcotic pain 

medications.  Will start the patient on Reglan 5 mg by mouth 3 times a day.  

Case was discussed with gastroenterology midlevel.


1/17 patient started on erythromycin as per GI.





(8) Toxic encephalopathy


ICD Code:  G92 - Toxic encephalopathy


Status:  Acute


Plan:  Patient with garbled and slurred speech. Upon review of the chart the 

patient has had a previous admission for overdose and has been admitted to the 

[facility]. I will discontinue all sedating agents for now except 

phenobarbital which patient states takes for seizures. I will place an order 

for RN To obtain medication llist from [facility]. 





Assessment and Plan


1.  GI Bleed:  +melena, Hemoccult attempted however not enough specimen for 

testing, in light of symptomatic anemia and melena will proceed w/ treatment 

for presumed GI Bleed.  Protonix IV.  Consult GI for further evaluation, likely 

EGD/Colonoscopy.  Monitor Hgb/Hct. 


1/13 Appreciate GI consultation. for EGD in am. Patient is sp transfusion 1 

unit of PRBC;s qith appropriate hemoglobin response.


1/14 EGD pending.


1/15 status post EGD with findings of a small hiatal hernia, food residue 

suggesting gastroparesis versus incomplete emptying, pyloric ulcer and possible 

duodenitis.  Await biopsies, avoid NSAIDs and aspirin.  Continue with PPI 

therapy with Protonix 40 mg by mouth twice a day.  GI recommends EGD 2 months 

with possible dilation.  Mechanical soft diet.


2.  Anemia:  Symptomatic.  Hgb 8.0, previously 7.9 on 11/5/17.  Type & Screen.  

Check repeat Hgb at midnight, transfuse if <8.  


1/13 sp transfusion of 1 unit of PRBC.


1/15 Hb stable.


3.  Dizziness:  Likely secondary to symptomatic anemia/GI Bleed.  CT Head w/ no 

acute findings, images reviewed by me.  Place on telemetry, monitor vitals.  

Repeat labs in am, replace electrolytes as needed. 


1/13 Dizziness resolved.


1/14 patient still complains of dizziness and describes it as if the room is 

spinning around.  Patient has a history of vertigo.  We'll start the patient on 

oral meclizine.


1/15 Dizziness resolved.


4.  Dehydration:  Resolved after IVF.


5.  UTI:  U/a w/ LE, mild bacteriuria, in light of fever and comorbidities will 

treat for uncomplicated UTI w/ IV Rocephin.  IVF for hydration. 


1/14 UA negative. DC antibiotics


6.  Seizure Disorder:  Chronic.  Resume home medications.  Ativan prn if 

needed. 


7.  DVT Prophylaxis: SCD/Teds.  Pharmacologic contraindication in light of GI 

Bleed.


8. Neck pain: 


1/14 CT of the cervical spine shows a stable CT scan of the cervical spine 

compared to a previous CT scan on 11/4/17.


1/15 Will DC IV Morphine as RN states patient is looking for IV morphine and 

requesting it frequently. CT cervical spine does not show any injury that would 

need IV morphine.  I will keep oral opiate medications.


Discharge Planning


Suspect patient is drug seeking. Possible DC in am after GI clerance, 

medications need to be reconciled with antonio vu. DC also pending 

resolution of encephalopathy.











Anmol Bowman MD Jan 17, 2018 17:51

## 2018-01-18 VITALS
HEART RATE: 70 BPM | RESPIRATION RATE: 20 BRPM | DIASTOLIC BLOOD PRESSURE: 80 MMHG | SYSTOLIC BLOOD PRESSURE: 133 MMHG | TEMPERATURE: 98.3 F | OXYGEN SATURATION: 98 %

## 2018-01-18 VITALS
RESPIRATION RATE: 20 BRPM | DIASTOLIC BLOOD PRESSURE: 76 MMHG | HEART RATE: 71 BPM | TEMPERATURE: 98.2 F | OXYGEN SATURATION: 95 % | SYSTOLIC BLOOD PRESSURE: 129 MMHG

## 2018-01-18 VITALS
TEMPERATURE: 96.3 F | SYSTOLIC BLOOD PRESSURE: 153 MMHG | RESPIRATION RATE: 15 BRPM | DIASTOLIC BLOOD PRESSURE: 90 MMHG | HEART RATE: 71 BPM | OXYGEN SATURATION: 95 %

## 2018-01-18 RX ADMIN — GABAPENTIN SCH MG: 400 CAPSULE ORAL at 10:14

## 2018-01-18 RX ADMIN — STANDARDIZED SENNA CONCENTRATE AND DOCUSATE SODIUM SCH TAB: 8.6; 5 TABLET, FILM COATED ORAL at 10:13

## 2018-01-18 RX ADMIN — PROCHLORPERAZINE EDISYLATE PRN MG: 5 INJECTION INTRAMUSCULAR; INTRAVENOUS at 10:49

## 2018-01-18 RX ADMIN — Medication SCH ML: at 10:12

## 2018-01-18 RX ADMIN — PANTOPRAZOLE SODIUM SCH MG: 40 INJECTION, POWDER, FOR SOLUTION INTRAVENOUS at 05:44

## 2018-01-18 RX ADMIN — METOCLOPRAMIDE HYDROCHLORIDE SCH MG: 10 TABLET ORAL at 10:13

## 2018-01-18 RX ADMIN — ATENOLOL SCH MG: 50 TABLET ORAL at 10:13

## 2018-01-24 ENCOUNTER — HOSPITAL ENCOUNTER (EMERGENCY)
Dept: HOSPITAL 17 - NEPD | Age: 43
Discharge: HOME | End: 2018-01-24
Payer: SELF-PAY

## 2018-01-24 VITALS
TEMPERATURE: 98.8 F | HEART RATE: 98 BPM | OXYGEN SATURATION: 98 % | DIASTOLIC BLOOD PRESSURE: 105 MMHG | RESPIRATION RATE: 14 BRPM | SYSTOLIC BLOOD PRESSURE: 152 MMHG

## 2018-01-24 VITALS
HEART RATE: 79 BPM | DIASTOLIC BLOOD PRESSURE: 87 MMHG | OXYGEN SATURATION: 100 % | RESPIRATION RATE: 18 BRPM | SYSTOLIC BLOOD PRESSURE: 159 MMHG

## 2018-01-24 VITALS — HEIGHT: 64 IN | BODY MASS INDEX: 34.22 KG/M2 | WEIGHT: 200.42 LBS

## 2018-01-24 DIAGNOSIS — F41.9: ICD-10-CM

## 2018-01-24 DIAGNOSIS — M79.7: ICD-10-CM

## 2018-01-24 DIAGNOSIS — J44.9: ICD-10-CM

## 2018-01-24 DIAGNOSIS — M54.2: Primary | ICD-10-CM

## 2018-01-24 DIAGNOSIS — F17.200: ICD-10-CM

## 2018-01-24 DIAGNOSIS — R56.9: ICD-10-CM

## 2018-01-24 DIAGNOSIS — F32.9: ICD-10-CM

## 2018-01-24 DIAGNOSIS — I10: ICD-10-CM

## 2018-01-24 DIAGNOSIS — R20.2: ICD-10-CM

## 2018-01-24 PROCEDURE — 96376 TX/PRO/DX INJ SAME DRUG ADON: CPT

## 2018-01-24 PROCEDURE — 96374 THER/PROPH/DIAG INJ IV PUSH: CPT

## 2018-01-24 PROCEDURE — 99285 EMERGENCY DEPT VISIT HI MDM: CPT

## 2018-01-24 PROCEDURE — 72125 CT NECK SPINE W/O DYE: CPT

## 2018-01-24 NOTE — PD
HPI


Chief Complaint:  Back/ Neck Pain or Injury


Time Seen by Provider:  17:30


Travel History


International Travel<30 days:  No


Contact w/Intl Traveler<30days:  No


Traveled to known affect area:  No





History of Present Illness


HPI


43yo F with PMH of fibromyalgia, cervical spine surgery, anxiety, depression, 

HTN here with c/o sharp pain from right neck down right shoulder for 3 days.  

Pain is severe and worst with head movement.  Pt has some tingling in bilateral 

tips of fingers.  Denies any focal weakness, chest pain, sob, n/v, abdominal 

pain.  Denies any trauma or fall.  No documented fever.  Pt follows with 

neurosurgeon in [place].





PFSH


Past Medical History


Arthritis:  Yes (knees and neck)


Autoimmune Disease:  No


Anxiety:  Yes


Depression:  Yes


Cancer:  No


Cardiovascular Problems:  No


COPD:  Yes (beginning stages)


Diabetes:  No


Endocrine:  No


Gastrointestinal Disorders:  Yes


Genitourinary:  No


Hypertension:  Yes


Immune Disorder:  No


Musculoskeletal:  Yes


Neurologic:  Yes


Psychiatric:  Yes


Reproductive:  No


Respiratory:  Yes


Migraines:  Yes


Seizures:  Yes


Thyroid Disease:  No


Ulcer:  Yes


Pregnant?:  Not Pregnant


:  4


Para:  2





Past Surgical History


Abdominal Surgery:  No


Cardiac Surgery:  No


Ear Surgery:  No


Endocrine Surgery:  No


Eye Surgery:  No


Genitourinary Surgery:  No


Gynecologic Surgery:  Yes ( x2)


Hysterectomy:  Yes


Neurologic Surgery:  Yes (Cervical Fusion)


Oral Surgery:  No


Pacemaker:  No


Thoracic Surgery:  No


Other Surgery:  Yes





Social History


Alcohol Use:  No


Tobacco Use:  Yes (/2 PPD)


Substance Use:  No





Allergies-Medications


(Allergen,Severity, Reaction):  


Coded Allergies:  


     Sulfa (Sulfonamide Antibiotics) (Verified  Allergy, Severe, 18)


     azithromycin (Verified  Allergy, Severe, 18)


     ondansetron (Verified  Allergy, Severe, Swelling, 18)


     cyclobenzaprine (Verified  Allergy, Intermediate, 18)


     propoxyphene (Verified  Allergy, Intermediate, 18)


     sumatriptan (Unverified  Adverse Reaction, Mild, Tingling, 18)


Reported Meds & Prescriptions





Reported Meds & Active Scripts


Active


Atenolol 50 Mg Tab 50 Mg PO BID 30 Days


Catapres (Clonidine) 0.2 Mg Tab 0.2 Mg PO TID 30 Days


Reported


Ambien (Zolpidem Tartrate) 10 Mg Tab 10 Mg PO HS PRN


Klonopin (Clonazepam) 1 Mg Tab 1 Mg PO TID


Fioricet (Butalbital-Acetaminophen-Caffeine) -40 Mg Cap 2 Cap PO Q4H PRN


Phenobarbital 64.8 Mg Tab 64.8 Mg PO TID


Gabapentin 800 Mg Tab 800 Mg PO Q6HR


Fluoxetine (Fluoxetine HCl) 40 Mg Cap 40 Cap PO DAILY








Review of Systems


Except as stated in HPI:  all other systems reviewed are Neg





Physical Exam


Narrative


GENERAL: 43yo F in moderate distress.


SKIN: Focused skin assessment warm/dry.


HEAD: Atraumatic. Normocephalic. 


EYES: Pupils equal and round. No scleral icterus. No injection or drainage. 


ENT: No midline cervical spine ttp.  +TTP right paraspinal muscles.


NECK: Trachea midline. No JVD. 


CARDIOVASCULAR: Regular rate and rhythm.  No murmur appreciated.


RESPIRATORY: No accessory muscle use. Clear to auscultation. Breath sounds 

equal bilaterally. 


GASTROINTESTINAL: Abdomen soft, non-tender, nondistended. 


MUSCULOSKELETAL: No obvious deformities. No clubbing.  No cyanosis.  No edema. 


NEUROLOGICAL: Awake and alert. No obvious cranial nerve deficits.  Motor 

grossly within normal limits in all extremities.  Sensation intact. Normal 

speech. 


PSYCHIATRIC: Anxious appearing.





Data


Data


Last Documented VS





Vital Signs








  Date Time  Temp Pulse Resp B/P (MAP) Pulse Ox O2 Delivery O2 Flow Rate FiO2


 


18 17:23  79 18 159/87 (111) 100 Room Air  


 


18 16:06 98.8       








Orders





 Orders


Ct Cerv Spine W/O Contrast (18 )


Diazepam (Valium) (18 17:45)


Morphine Inj (Morphine Inj) (18 17:45)


Morphine Inj (Morphine Inj) (18 20:00)


Ed Discharge Order (18 19:53)








[facility]


Medical Decision Making


Medical Screen Exam Complete:  Yes


Emergency Medical Condition:  Yes


Differential Diagnosis


Cervical radiculopathy vs. musculoskeletal pain vs. fibromyalgia


Narrative Course


43yo F with right sided neck pain that radiates down right shoulder.  Denies 

any trauma but had surgery in neck before.  Pt said toradol does not help so 

will give morphine, and valium.  Pt is insistent on getting imaging so will 

obtain CT cervical spine.  CT cervical spine showed stable cervical spine CT 

compare with .  Extensive postoperative changes and cervical 

deformities as above.  Pt given morphine and valium for pain with improvement.  

Pt needs to follow up with her neurosurgeon.  Return precautions given.





Diagnosis





 Primary Impression:  


 Neck pain


Patient Instructions:  General Instructions


Departure Forms:  Tests/Procedures





***Additional Instructions:  


Please follow up with your primary care physician in 2-3 days.  Return to the 

ED if symptoms worsen.


***Med/Other Pt SpecificInfo:  Prescription(s) given


Scripts


Hydrocodone/Acetaminophen (Hydrocodone-Acetamin 5-325 mg) 5 Mg-325 Mg Tablet


1 TAB PO Q6HR Y for PAIN SCALE 1 TO 4, #7


   Prov: Caridad Means DO         18


Disposition:  01 DISCHARGE HOME


Condition:  Stable











Caridad Means DO 2018 17:48

## 2018-01-24 NOTE — RADRPT
EXAM DATE/TIME:  01/24/2018 18:05 

 

[facility] COMPARISON:     

No previous studies available for comparison.

 

 

INDICATIONS :     

Neck pain with numbness to right shoulder.

                      

 

RADIATION DOSE:     

25.56 CTDIvol (mGy) 

 

 

 

MEDICAL HISTORY :     

Hypertension. Chronic obstructive pulmonary disease. Cardiovascular disease

 

SURGICAL HISTORY :      

Fusion, cervical. Hysterectomy.

 

ENCOUNTER:      

Initial

 

ACUITY:      

1 day

 

PAIN SCALE:      

9/10

 

LOCATION:       

Right  neck region.

 

TECHNIQUE:     

Volumetric scanning of the cervical spine was performed. Multiplanar reconstructions in the sagittal,
 coronal and oblique axial planes were performed.   Using automated exposure control and adjustment o
f the mA and/or kV according to patient size, radiation dose was kept as low as reasonably achievable
 to obtain optimal diagnostic quality images.   DICOM format image data is available electronically f
or review and comparison.  

 

FINDINGS:     

Comparison is from 10 days ago. No significant change in the appearance of the cervical spine. There 
is previous metallic posterior cervical fusion and thoracic fusion to the level of T1 and occipital f
usion. There is anterior cervical fusion extending from C2-C5. Abnormal angular deformity between C2 
and C3 is stable. Compression deformity of C5 is stable. Unroofing of the posterior elements is stabl
e. No significant central bony canal stenosis. Staple abnormal kyphosis between C3 and C7.

 

CONCLUSION:     

1. Stable cervical spine CT compared with January 14. Extensive postoperative changes and cervical de
formities as above.

 

 

 

 Fabio Alvarez MD on January 24, 2018 at 18:57           

Board Certified Radiologist.

 This report was verified electronically.

## 2018-02-08 ENCOUNTER — HOSPITAL ENCOUNTER (EMERGENCY)
Dept: HOSPITAL 17 - NEPD | Age: 43
LOS: 1 days | Discharge: HOME | End: 2018-02-09
Payer: SELF-PAY

## 2018-02-08 VITALS — HEIGHT: 64 IN | BODY MASS INDEX: 33.87 KG/M2 | WEIGHT: 198.42 LBS

## 2018-02-08 VITALS
OXYGEN SATURATION: 97 % | SYSTOLIC BLOOD PRESSURE: 151 MMHG | RESPIRATION RATE: 16 BRPM | TEMPERATURE: 98.1 F | HEART RATE: 52 BPM | DIASTOLIC BLOOD PRESSURE: 89 MMHG

## 2018-02-08 DIAGNOSIS — J44.9: ICD-10-CM

## 2018-02-08 DIAGNOSIS — M17.11: Primary | ICD-10-CM

## 2018-02-08 DIAGNOSIS — I10: ICD-10-CM

## 2018-02-08 DIAGNOSIS — F17.210: ICD-10-CM

## 2018-02-08 PROCEDURE — 99283 EMERGENCY DEPT VISIT LOW MDM: CPT

## 2018-02-08 PROCEDURE — 73564 X-RAY EXAM KNEE 4 OR MORE: CPT

## 2018-02-09 NOTE — PD
HPI


Chief Complaint:  Musculoskeletal Complaint


Time Seen by Provider:  00:16


Travel History


International Travel<30 days:  No


Contact w/Intl Traveler<30days:  No


Traveled to known affect area:  No





History of Present Illness


HPI


42-year-old female came to the emergency room for right knee pain after she 

tripped and fell 2 days ago.  Patient has also been complaining of shortness of 

breath and says she has history of COPD.  She appeared a little bit lethargic 

as she was trying to answer my questions.  She has history of previous neck 

surgery.  I was told by the nurse that she after coming into the room walk to 

the restroom without much difficulty.  Vital signs are stable.  No open wounds.





PFSH


Past Medical History


*** Narrative Medical


List of her past medical, surgical, social and family history is reviewed from 

the nursing note


Arthritis:  Yes (knees and neck)


Autoimmune Disease:  No


Anxiety:  Yes


Depression:  Yes


Heart Rhythm Problems:  Yes (tachycardic arrythmia)


Cancer:  No


Cardiovascular Problems:  Yes (Tachycardia, Murmor)


COPD:  Yes


Diabetes:  No


Endocrine:  No


Gastrointestinal Disorders:  Yes


Genitourinary:  No


Hypertension:  Yes


Immune Disorder:  No


Musculoskeletal:  Yes


Neurologic:  Yes


Psychiatric:  Yes


Reproductive:  No


Respiratory:  Yes (COPD)


Migraines:  Yes


Seizures:  Yes


Thyroid Disease:  No


Ulcer:  Yes


Tetanus Vaccination:  < 5 Years


Influenza Vaccination:  No


Pregnant?:  Not Pregnant


:  4


Para:  2





Past Surgical History


Abdominal Surgery:  No


Cardiac Surgery:  No


Ear Surgery:  No


Endocrine Surgery:  No


Eye Surgery:  No


Genitourinary Surgery:  Yes (laproscopic endometriosis repair)


Gynecologic Surgery:  Yes ( x2)


Hysterectomy:  Yes


Neurologic Surgery:  Yes (Cervical Fusionx2)


Oral Surgery:  No


Pacemaker:  No


Thoracic Surgery:  No


Other Surgery:  Yes





Social History


Alcohol Use:  No


Tobacco Use:  Yes (1 cig daily)


Substance Use:  No





Allergies-Medications


(Allergen,Severity, Reaction):  


Coded Allergies:  


     Sulfa (Sulfonamide Antibiotics) (Verified  Allergy, Severe, 18)


     azithromycin (Verified  Allergy, Severe, 18)


     ondansetron (Verified  Allergy, Severe, Swelling, 18)


     cyclobenzaprine (Verified  Allergy, Intermediate, 18)


     propoxyphene (Verified  Allergy, Intermediate, 18)


     sumatriptan (Unverified  Adverse Reaction, Mild, Tingling, 18)


Comments


List of her allergies reviewed from the nursing note.


Reported Meds & Prescriptions





Reported Meds & Active Scripts


Active


Atenolol 50 Mg Tab 50 Mg PO BID 30 Days


Catapres (Clonidine) 0.2 Mg Tab 0.2 Mg PO TID 30 Days


Reported


Klonopin (Clonazepam) 1 Mg Tab 1 Mg PO TID


Gabapentin 800 Mg Tab 800 Mg PO Q6HR


Fluoxetine (Fluoxetine HCl) 40 Mg Cap 40 Cap PO DAILY





Narrative Medication


List of her home medications reviewed from the nursing note.





Review of Systems


Except as stated in HPI:  all other systems reviewed are Neg





Physical Exam


Narrative


GENERAL: Lethargic, slurred speech but answering questions appropriately


SKIN: Focused skin assessment warm/dry.


HEAD: Atraumatic. Normocephalic. 


EYES: Pupils equal and round. No scleral icterus. No injection or drainage. 


ENT: No nasal bleeding or discharge.  Mucous membranes pink and moist.


NECK: Trachea midline. No JVD. 


CARDIOVASCULAR: Regular rate and rhythm.  No murmur appreciated.


RESPIRATORY: No accessory muscle use. Clear to auscultation. Breath sounds 

equal bilaterally. 


GASTROINTESTINAL: Abdomen soft, non-tender, nondistended. Hepatic and splenic 

margins not palpable. 


MUSCULOSKELETAL: No obvious deformities. No clubbing.  No cyanosis.  No edema. 


NEUROLOGICAL: Lethargic. No obvious cranial nerve deficits.  Motor grossly 

within normal limits.  Slurred speech.


PSYCHIATRIC: Appropriate mood and affect; insight and judgment normal.





Data


Data


Last Documented VS





Vital Signs








  Date Time  Temp Pulse Resp B/P (MAP) Pulse Ox O2 Delivery O2 Flow Rate FiO2


 


18 01:41        


 


18 21:18 98.1 52 16  97 Room Air  








Orders





 Orders


Knee, Complete (4vws) (18 )


Ibuprofen (Motrin) (18 00:30)


Ed Discharge Order (18 01:30)








MDM


Medical Decision Making


Medical Screen Exam Complete:  Yes


Emergency Medical Condition:  Yes


Medical Record Reviewed:  Yes


Differential Diagnosis


Knee joint arthritis, fracture,


Narrative Course


12:52 AM knee x-ray has been ordered.  Awaiting for the x-ray to be done and 

resulted.  I've ordered ibuprofen for the pain.  I noticed that patient takes 

phenobarbital.  I'll order a phenobarbital level.  This will be signed out to 

the nurse practitioner.





Procedures


EKG Prior to Arrival:  Terrie Elizabeth MD 2018 00:16

## 2018-02-09 NOTE — RADRPT
EXAM DATE/TIME:  02/09/2018 00:42 

 

[facility] COMPARISON:     

No previous studies available for comparison.

 

                     

INDICATIONS :     

Patient complains of right knee pain after falling on it at home. Pain in area of patella.

                     

 

MEDICAL HISTORY :     

None.          

 

SURGICAL HISTORY :     

None.   

 

ENCOUNTER:     

Initial                                        

 

ACUITY:     

3 days      

 

PAIN SCORE:     

7/10

 

LOCATION:     

Right  Knee

 

FINDINGS:     

There is moderate osteoarthritis of the right knee predominantly at the lateral joint compartment wit
h mild joint space narrowing, sclerosis and osteophyte formation. No acute fracture or subluxation. S
mall joint effusion.

 

CONCLUSION:     

1. Moderate arthritis of the right knee especially at the lateral joint compartment.

 

 

 

 Fabio Alvarez MD on February 09, 2018 at 1:08           

Board Certified Radiologist.

 This report was verified electronically.

## 2018-02-09 NOTE — PD
Physical Exam


Date Seen by Provider:  Feb 9, 2018


Time Seen by Provider:  01:26


Narrative


For full history and physical examination please see previous providers note.  

I assumed care of this patient change of shift.





Data


Data


Last Documented VS





Vital Signs








  Date Time  Temp Pulse Resp B/P (MAP) Pulse Ox O2 Delivery O2 Flow Rate FiO2


 


2/8/18 21:18 98.1 52 16 151/89 (109) 97 Room Air  








Orders





 Orders


Knee, Complete (4vws) (2/9/18 )


Ibuprofen (Motrin) (2/9/18 00:30)


Phenobarbital (2/9/18 00:53)








MDM


Medical Record Reviewed:  Yes


Supervised Visit with EDEL:  Yes


Interpretation(s)





Last Impressions








Knee X-Ray 2/9/18 0000 Signed





Impressions: 





 Service Date/Time:  Friday, February 9, 2018 00:42 - CONCLUSION:  1. Moderate 





 arthritis of the right knee especially at the lateral joint compartment.     





 Fabio Alvarez MD 








Vital Signs








  Date Time  Temp Pulse Resp B/P (MAP) Pulse Ox O2 Delivery O2 Flow Rate FiO2


 


2/8/18 21:18 98.1 52 16 151/89 (109) 97 Room Air  








Differential Diagnosis


Contusion versus effusion versus fracture versus arthritis versus other


Narrative Course


Initially a phenobarbital level was ordered due to patient's drowsiness and 

lethargy.  Patient reported that she is not taking phenobarbital any longer.  

Patient is alert and being quite rude.  She was advised that there are no acute 

findings on her x-ray other than arthritis.  She was encouraged to follow-up 

with her primary doctor for more advanced imaging if conservative management 

failed.  She was encouraged to alternate heat and ice the affected area, 

continue gentle range of motion exercises, avoid exacerbating activities and 

take anti-inflammatory medication as directed.  She was offered a lidocaine 

patch but stated she had those at her house.  She then requested tramadol.  

Patient was advised that tramadol is not indicated for arthritic pain.  Patient 

stated then what did I come here for.  She was advised that there were no 

emergent issues identified and with conservative management she should feel 

better and if she does not she is free to return to the emergency department 

and would be glad to take care of her.


Diagnosis





 Primary Impression:  


 Arthritis of knee


 Additional Impression:  


 Knee pain


 Qualified Codes:  M25.569 - Pain in unspecified knee


Referrals:  


Primary Care Physician


Patient Instructions:  Arthralgia (ED), Arthritis (ED), General Instructions





***Additional Instruction:  


Continue conservative management


Alternate heat and ice the affected area, continue range of motion exercises, 

avoid exacerbating activities


Take ibuprofen as needed as directed for pain


Return to emergency department for any new worsening symptoms


***Med/Other Pt SpecificInfo:  No Change to Meds


Disposition:  01 DISCHARGE HOME


Condition:  Stable











Digna Silva Feb 9, 2018 01:29

## 2018-02-27 ENCOUNTER — HOSPITAL ENCOUNTER (INPATIENT)
Dept: HOSPITAL 17 - NEPE | Age: 43
LOS: 1 days | Discharge: HOME | DRG: 190 | End: 2018-02-28
Attending: HOSPITALIST | Admitting: HOSPITALIST
Payer: SELF-PAY

## 2018-02-27 VITALS — OXYGEN SATURATION: 96 %

## 2018-02-27 VITALS
SYSTOLIC BLOOD PRESSURE: 88 MMHG | HEART RATE: 64 BPM | RESPIRATION RATE: 24 BRPM | OXYGEN SATURATION: 100 % | DIASTOLIC BLOOD PRESSURE: 52 MMHG | TEMPERATURE: 98.3 F

## 2018-02-27 VITALS
DIASTOLIC BLOOD PRESSURE: 80 MMHG | RESPIRATION RATE: 22 BRPM | OXYGEN SATURATION: 95 % | HEART RATE: 64 BPM | SYSTOLIC BLOOD PRESSURE: 111 MMHG

## 2018-02-27 DIAGNOSIS — N17.9: ICD-10-CM

## 2018-02-27 DIAGNOSIS — K56.7: ICD-10-CM

## 2018-02-27 DIAGNOSIS — G89.29: ICD-10-CM

## 2018-02-27 DIAGNOSIS — F32.9: ICD-10-CM

## 2018-02-27 DIAGNOSIS — J44.1: ICD-10-CM

## 2018-02-27 DIAGNOSIS — Z76.5: ICD-10-CM

## 2018-02-27 DIAGNOSIS — K59.09: ICD-10-CM

## 2018-02-27 DIAGNOSIS — G40.909: ICD-10-CM

## 2018-02-27 DIAGNOSIS — Z88.1: ICD-10-CM

## 2018-02-27 DIAGNOSIS — J44.0: Primary | ICD-10-CM

## 2018-02-27 DIAGNOSIS — I10: ICD-10-CM

## 2018-02-27 DIAGNOSIS — E86.0: ICD-10-CM

## 2018-02-27 DIAGNOSIS — F41.9: ICD-10-CM

## 2018-02-27 DIAGNOSIS — I95.9: ICD-10-CM

## 2018-02-27 DIAGNOSIS — J18.9: ICD-10-CM

## 2018-02-27 DIAGNOSIS — M19.90: ICD-10-CM

## 2018-02-27 DIAGNOSIS — M54.2: ICD-10-CM

## 2018-02-27 DIAGNOSIS — F43.21: ICD-10-CM

## 2018-02-27 DIAGNOSIS — Z72.0: ICD-10-CM

## 2018-02-27 LAB
BASOPHILS # BLD AUTO: 0.1 TH/MM3 (ref 0–0.2)
BASOPHILS NFR BLD: 0.5 % (ref 0–2)
BUN SERPL-MCNC: 26 MG/DL (ref 7–18)
CALCIUM SERPL-MCNC: 9.5 MG/DL (ref 8.5–10.1)
CHLORIDE SERPL-SCNC: 101 MEQ/L (ref 98–107)
CREAT SERPL-MCNC: 1.78 MG/DL (ref 0.5–1)
EOSINOPHIL # BLD: 0 TH/MM3 (ref 0–0.4)
EOSINOPHIL NFR BLD: 0.3 % (ref 0–4)
ERYTHROCYTE [DISTWIDTH] IN BLOOD BY AUTOMATED COUNT: 20.3 % (ref 11.6–17.2)
GFR SERPLBLD BASED ON 1.73 SQ M-ARVRAT: 31 ML/MIN (ref 89–?)
GLUCOSE SERPL-MCNC: 80 MG/DL (ref 74–106)
HCO3 BLD-SCNC: 18.6 MEQ/L (ref 21–32)
HCT VFR BLD CALC: 35.5 % (ref 35–46)
HGB BLD-MCNC: 11.3 GM/DL (ref 11.6–15.3)
INR PPP: 1 RATIO
LYMPHOCYTES # BLD AUTO: 0.7 TH/MM3 (ref 1–4.8)
LYMPHOCYTES NFR BLD AUTO: 6.5 % (ref 9–44)
MAGNESIUM SERPL-MCNC: 2.4 MG/DL (ref 1.5–2.5)
MCH RBC QN AUTO: 27.2 PG (ref 27–34)
MCHC RBC AUTO-ENTMCNC: 31.8 % (ref 32–36)
MCV RBC AUTO: 85.4 FL (ref 80–100)
MONOCYTE #: 0.4 TH/MM3 (ref 0–0.9)
MONOCYTES NFR BLD: 4 % (ref 0–8)
NEUTROPHILS # BLD AUTO: 9.8 TH/MM3 (ref 1.8–7.7)
NEUTROPHILS NFR BLD AUTO: 88.7 % (ref 16–70)
PLATELET # BLD: 251 TH/MM3 (ref 150–450)
PMV BLD AUTO: 7.9 FL (ref 7–11)
PROTHROMBIN TIME: 9.8 SEC (ref 9.8–11.6)
RBC # BLD AUTO: 4.15 MIL/MM3 (ref 4–5.3)
SODIUM SERPL-SCNC: 131 MEQ/L (ref 136–145)
TROPONIN I SERPL-MCNC: (no result) NG/ML (ref 0.02–0.05)
WBC # BLD AUTO: 11 TH/MM3 (ref 4–11)

## 2018-02-27 PROCEDURE — 84484 ASSAY OF TROPONIN QUANT: CPT

## 2018-02-27 PROCEDURE — 80048 BASIC METABOLIC PNL TOTAL CA: CPT

## 2018-02-27 PROCEDURE — 87186 SC STD MICRODIL/AGAR DIL: CPT

## 2018-02-27 PROCEDURE — 87070 CULTURE OTHR SPECIMN AEROBIC: CPT

## 2018-02-27 PROCEDURE — 86403 PARTICLE AGGLUT ANTBDY SCRN: CPT

## 2018-02-27 PROCEDURE — 83880 ASSAY OF NATRIURETIC PEPTIDE: CPT

## 2018-02-27 PROCEDURE — 94640 AIRWAY INHALATION TREATMENT: CPT

## 2018-02-27 PROCEDURE — 82550 ASSAY OF CK (CPK): CPT

## 2018-02-27 PROCEDURE — 94664 DEMO&/EVAL PT USE INHALER: CPT

## 2018-02-27 PROCEDURE — 93005 ELECTROCARDIOGRAM TRACING: CPT

## 2018-02-27 PROCEDURE — 87040 BLOOD CULTURE FOR BACTERIA: CPT

## 2018-02-27 PROCEDURE — 71046 X-RAY EXAM CHEST 2 VIEWS: CPT

## 2018-02-27 PROCEDURE — 96365 THER/PROPH/DIAG IV INF INIT: CPT

## 2018-02-27 PROCEDURE — 87205 SMEAR GRAM STAIN: CPT

## 2018-02-27 PROCEDURE — 87804 INFLUENZA ASSAY W/OPTIC: CPT

## 2018-02-27 PROCEDURE — 85025 COMPLETE CBC W/AUTO DIFF WBC: CPT

## 2018-02-27 PROCEDURE — 85610 PROTHROMBIN TIME: CPT

## 2018-02-27 PROCEDURE — 82552 ASSAY OF CPK IN BLOOD: CPT

## 2018-02-27 PROCEDURE — 83605 ASSAY OF LACTIC ACID: CPT

## 2018-02-27 PROCEDURE — 87147 CULTURE TYPE IMMUNOLOGIC: CPT

## 2018-02-27 PROCEDURE — 85730 THROMBOPLASTIN TIME PARTIAL: CPT

## 2018-02-27 PROCEDURE — 83735 ASSAY OF MAGNESIUM: CPT

## 2018-02-27 PROCEDURE — 74176 CT ABD & PELVIS W/O CONTRAST: CPT

## 2018-02-27 RX ADMIN — GABAPENTIN SCH MG: 400 CAPSULE ORAL at 23:37

## 2018-02-27 RX ADMIN — HEPARIN SODIUM SCH UNITS: 10000 INJECTION, SOLUTION INTRAVENOUS; SUBCUTANEOUS at 23:36

## 2018-02-27 RX ADMIN — IPRATROPIUM BROMIDE AND ALBUTEROL SULFATE SCH AMPULE: .5; 3 SOLUTION RESPIRATORY (INHALATION) at 21:00

## 2018-02-27 RX ADMIN — IPRATROPIUM BROMIDE AND ALBUTEROL SULFATE SCH AMPULE: .5; 3 SOLUTION RESPIRATORY (INHALATION) at 21:06

## 2018-02-27 NOTE — PD
HPI


Chief Complaint:  Respiratory Symptoms


Time Seen by Provider:  20:40


Travel History


International Travel<30 days:  No


Contact w/Intl Traveler<30days:  No


Traveled to known affect area:  No





History of Present Illness


HPI


This is a 42-year-old female who presents for evaluation of dizziness, 

lightheadedness, cough, congestion, fevers, chills, nausea, vomiting.  Symptoms 

started 5 days ago.  She reports approximately 3 episodes of nonbloody emesis 

on a daily basis and she reports cough with productive yellow sputum.  She 

reports fevers as high as 103.5.  She reports rib cage pain secondary to 

coughing.  She denies acute abdominal pain, dysuria, flank pain, rash, recent 

travel, sore throat.  No sick contacts.  Reports history of COPD.  Denies IV 

drug use.  No other complaints at this time.





PFSH


Past Medical History


Arthritis:  Yes (knees and neck)


Autoimmune Disease:  No


Anxiety:  Yes


Depression:  Yes


Heart Rhythm Problems:  Yes (tachycardic arrythmia)


Cancer:  No


Cardiovascular Problems:  Yes


COPD:  Yes


Diabetes:  No


Endocrine:  No


Gastrointestinal Disorders:  Yes


Genitourinary:  No


Hypertension:  Yes


Immune Disorder:  No


Musculoskeletal:  Yes


Neurologic:  Yes


Psychiatric:  Yes


Reproductive:  No


Respiratory:  Yes


Migraines:  Yes


Seizures:  Yes


Thyroid Disease:  No


Ulcer:  Yes


:  4


Para:  2





Past Surgical History


Abdominal Surgery:  No


Cardiac Surgery:  No


Ear Surgery:  No


Endocrine Surgery:  No


Eye Surgery:  No


Genitourinary Surgery:  Yes (laproscopic endometriosis repair)


Gynecologic Surgery:  Yes ( x2)


Hysterectomy:  Yes


Neurologic Surgery:  Yes (Cervical Fusionx2)


Oral Surgery:  No


Pacemaker:  No


Thoracic Surgery:  No


Other Surgery:  Yes





Social History


Alcohol Use:  No


Tobacco Use:  Yes (1 cig daily)


Substance Use:  No





Allergies-Medications


(Allergen,Severity, Reaction):  


Coded Allergies:  


     Sulfa (Sulfonamide Antibiotics) (Verified  Allergy, Severe, 18)


     azithromycin (Verified  Allergy, Severe, 18)


     ondansetron (Verified  Allergy, Severe, Swelling, 18)


     cyclobenzaprine (Verified  Allergy, Intermediate, 18)


     propoxyphene (Verified  Allergy, Intermediate, 18)


     sumatriptan (Unverified  Adverse Reaction, Mild, Tingling, 18)


Reported Meds & Prescriptions





Reported Meds & Active Scripts


Active


Atenolol 50 Mg Tab 50 Mg PO BID 30 Days


Catapres (Clonidine) 0.2 Mg Tab 0.2 Mg PO TID 30 Days


Reported


Klonopin (Clonazepam) 1 Mg Tab 1 Mg PO TID


Gabapentin 800 Mg Tab 800 Mg PO Q6HR


Fluoxetine (Fluoxetine HCl) 40 Mg Cap 40 Cap PO DAILY








Review of Systems


Except as stated in HPI:  all other systems reviewed are Neg





Physical Exam


Narrative


GENERAL: Well-developed well-nourished female in no acute distress drinking 

water on initial examination.


SKIN: Warm and dry.


HEAD: Atraumatic. Normocephalic. 


EYES: Pupils equal and round. No scleral icterus. No injection or drainage. 


ENT: No nasal bleeding or discharge.  Mucous membranes pink and moist.


NECK: Trachea midline. No JVD. 


CARDIOVASCULAR: Regular rate and rhythm.  No murmur appreciated.


RESPIRATORY: No accessory muscle use.  Wheezing noted bilaterally.


GASTROINTESTINAL: Abdomen soft, non-tender, nondistended. Hepatic and splenic 

margins not palpable. 


MUSCULOSKELETAL: No obvious deformities. No clubbing.  No cyanosis.  No edema. 


NEUROLOGICAL: Awake and alert. No obvious cranial nerve deficits.  Motor 

grossly within normal limits. Normal speech.


PSYCHIATRIC: Appropriate mood and affect; insight and judgment normal.





Data


Data


Last Documented VS





Vital Signs








  Date Time  Temp Pulse Resp B/P (MAP) Pulse Ox O2 Delivery O2 Flow Rate FiO2


 


18 20:20 98.3 64 24 88/52 (64) 100   








Orders





 Orders


Electrocardiogram (18 20:25)


Basic Metabolic Panel (Bmp) (18 20:25)


B-Type Natriuretic Peptide (18 20:25)


Ckmb (Isoenzyme) Profile (18 20:25)


Complete Blood Count With Diff (18 20:25)


Magnesium (Mg) (18 20:25)


Prothrombin Time / Inr (Pt) (18 20:25)


Act Partial Throm Time (Ptt) (18 20:25)


Troponin I (18 20:25)


Chest, Pa & Lat (18 20:25)


Blood Culture (18 20:25)


Lactic Acid Sepsis Protocol (18 20:25)


Influenzae A/B Antigen (18 20:49)


Sodium Chlor 0.9% 1000 Ml Inj (Ns 1000 M (18 20:49)


Drug Screen, Random Urine (18 20:52)


Albuterol-Ipratropium Neb (Duoneb Neb) (18 21:00)


Ct Abd/Pel W/O Iv Contrast (18 21:20)


Acet-Butal-Caff 325-50-40 Mg (Fioricet 3 (18 22:00)


CKMB (18 21:25)


CKMB% (18 21:25)


Sodium Chlor 0.9% 1000 Ml Inj (Ns 1000 M (18 22:15)


Levofloxacin 750 Mg Premix Inj (Levaquin (18 22:15)


Fluoxetine (Prozac) (18 09:00)


Gabapentin (Neurontin) (18 00:00)


Admit Order (Ed Use Only) (18 22:51)





Labs





Laboratory Tests








Test


  18


21:25


 


White Blood Count 11.0 TH/MM3 


 


Red Blood Count 4.15 MIL/MM3 


 


Hemoglobin 11.3 GM/DL 


 


Hematocrit 35.5 % 


 


Mean Corpuscular Volume 85.4 FL 


 


Mean Corpuscular Hemoglobin 27.2 PG 


 


Mean Corpuscular Hemoglobin


Concent 31.8 % 


 


 


Red Cell Distribution Width 20.3 % 


 


Platelet Count 251 TH/MM3 


 


Mean Platelet Volume 7.9 FL 


 


Neutrophils (%) (Auto) 88.7 % 


 


Lymphocytes (%) (Auto) 6.5 % 


 


Monocytes (%) (Auto) 4.0 % 


 


Eosinophils (%) (Auto) 0.3 % 


 


Basophils (%) (Auto) 0.5 % 


 


Neutrophils # (Auto) 9.8 TH/MM3 


 


Lymphocytes # (Auto) 0.7 TH/MM3 


 


Monocytes # (Auto) 0.4 TH/MM3 


 


Eosinophils # (Auto) 0.0 TH/MM3 


 


Basophils # (Auto) 0.1 TH/MM3 


 


CBC Comment DIFF FINAL 


 


Differential Comment  


 


Prothrombin Time 9.8 SEC 


 


Prothromb Time International


Ratio 1.0 RATIO 


 


 


Activated Partial


Thromboplast Time 31.5 SEC 


 


 


Blood Urea Nitrogen 26 MG/DL 


 


Creatinine 1.78 MG/DL 


 


Random Glucose 80 MG/DL 


 


Calcium Level 9.5 MG/DL 


 


Magnesium Level 2.4 MG/DL 


 


Sodium Level 131 MEQ/L 


 


Potassium Level 3.7 MEQ/L 


 


Chloride Level 101 MEQ/L 


 


Carbon Dioxide Level 18.6 MEQ/L 


 


Anion Gap 11 MEQ/L 


 


Estimat Glomerular Filtration


Rate 31 ML/MIN 


 


 


Lactic Acid Level 1.7 mmol/L 


 


Total Creatine Kinase 155 U/L 


 


Creatine Kinase MB 0.8 NG/ML 


 


Troponin I


  LESS THAN 0.02


NG/ML


 


B-Type Natriuretic Peptide 48 PG/ML 











MDM


Medical Decision Making


Medical Screen Exam Complete:  Yes


Emergency Medical Condition:  Yes


Medical Record Reviewed:  Yes


Differential Diagnosis


Pneumonia, influenza, bronchitis, dehydration, gastroenteritis


Narrative Course


The patient was placed on ECG monitoring and pulse oximetry.  She was given IV 

fluids, DuoNeb treatment.  Lab work, chest x-ray ordered.  Chest x-ray reveals 

patchy alveolar findings suggestive of pneumonia.  There appeared to be 

abnormal dilation of her bowels and therefore CT abdomen and pelvis was 

obtained revealing findings of ileus with large amount of fecal material in the 

right colon which appears chronic per radiology read.  She does report a 

history of chronic constipation, she reports that she has not had a bowel 

movement in the past few weeks, take magnesium citrate yesterday.  CBC reveals 

a WBC count of 11, 88.7% neutrophils.  BUN is 26, creatinine 1.78, GFR 31 which 

is significantly decreased from baseline.  At this point I will plan is to 

admit the patient for pneumonia, acute kidney injury.  She is allergic to 

azithromycin.  She was given Levaquin.  Her blood pressure improved during her 

hospital stay-most recent blood pressure reading was 105/66.





Diagnosis





 Primary Impression:  


 Community acquired pneumonia


 Additional Impression:  


 Acute kidney injury





Admitting Information


Admitting Physician Requests:  Kt Griffith 2018 20:52

## 2018-02-27 NOTE — RADRPT
EXAM DATE/TIME:  2018 21:52 

 

[facility] COMPARISON:     

CT ABDOMEN & PELVIS W CONTRAST, 2017, 14:56.  CT THORAX W CONTRAST, 2017, 1
4:58.

 

 

INDICATIONS :     

Bloody emesis, fever and congestion. Right abdomen pain.

                  

 

ORAL CONTRAST:      

No oral contrast ingested.

                  

 

RADIATION DOSE:     

13.10 CTDIvol (mGy) 

 

 

MEDICAL HISTORY :     

Hypertension. Ulcers. Chronic obstructive pulmonary disease.

 

SURGICAL HISTORY :      

Hysterectomy.  section.

 

ENCOUNTER:      

Initial

 

ACUITY:      

1 day

 

PAIN SCALE:      

0/10

 

LOCATION:         

Abdomen.

 

TECHNIQUE:     

Volumetric scanning of the abdomen and pelvis was performed.  Using automated exposure control and ad
justment of the mA and/or kV according to patient size, radiation dose was kept as low as reasonably 
achievable to obtain optimal diagnostic quality images.  DICOM format image data is available electro
nically for review and comparison.  

 

FINDINGS:     

There is a 10 mm groundglass opacity in the right lower lobe new when compared to the prior exam. Fol
lowup CT scan in 3 months is recommended. The liver and spleen are free of focal defects. The gallbla
dder and pancreas demonstrate no abnormality. The adrenal glands are normal. The kidneys demonstrate 
no evidence of solid renal mass or hydronephrosis. No free fluid or abdominal masses are identified. 
No para-aortic adenopathy is seen. There are findings of colonic ileus with a large amount of fecal m
aterial in the right colon

 

Examination of the pelvis demonstrates no evidence of free fluid or pelvic mass. No abnormally enlarg
ed inguinal or retroperitoneal lymph nodes are present. The bladder is unremarkable.

 

CONCLUSION:     

1. Findings of colonic ileus with large amount of fecal material in the right colon which appears chr
onic.

2. No evidence of acute abdominal or pelvic process. No masses are identified.

3. Ground glass nodule right lower lobe. Followup CT scan in 3 months is recommended.

 

 

 

 Brown Samayoa MD on 2018 at 22:12           

Board Certified Radiologist.

 This report was verified electronically.

## 2018-02-27 NOTE — RADRPT
EXAM DATE/TIME:  02/27/2018 20:55 

 

[facility] COMPARISON:     

CT THORAX W CONTRAST, November 04, 2017, 14:58.  CHEST SINGLE AP, November 05, 2017, 4:59.  CHEST SIN
GLE AP, November 06, 2017, 5:36.

 

                     

INDICATIONS :     

Short of breath.

                     

 

MEDICAL HISTORY :     

None.          

 

SURGICAL HISTORY :     

None.   

 

ENCOUNTER:     

Initial                                        

 

ACUITY:     

1 day      

 

PAIN SCORE:     

6/10

 

LOCATION:     

Bilateral  chest

 

FINDINGS:     

The cardiac silhouette is enlarged in transverse diameter. There is patchy alveolar disease bilateral
ly compatible with edema or pneumonia. No pleural effusions are identified. The lungs are hypoinflate
d.

 

CONCLUSION:     

1. Patchy alveolar disease characteristic of edema or pneumonia.  

 

 

 

 Brown Samayoa MD on February 27, 2018 at 21:40           

Board Certified Radiologist.

 This report was verified electronically.

## 2018-02-28 VITALS
DIASTOLIC BLOOD PRESSURE: 86 MMHG | TEMPERATURE: 96.6 F | HEART RATE: 67 BPM | SYSTOLIC BLOOD PRESSURE: 116 MMHG | OXYGEN SATURATION: 96 % | RESPIRATION RATE: 20 BRPM

## 2018-02-28 VITALS
RESPIRATION RATE: 18 BRPM | TEMPERATURE: 97.4 F | SYSTOLIC BLOOD PRESSURE: 112 MMHG | HEART RATE: 70 BPM | OXYGEN SATURATION: 98 % | DIASTOLIC BLOOD PRESSURE: 58 MMHG

## 2018-02-28 VITALS
DIASTOLIC BLOOD PRESSURE: 64 MMHG | SYSTOLIC BLOOD PRESSURE: 117 MMHG | OXYGEN SATURATION: 95 % | TEMPERATURE: 97.7 F | HEART RATE: 78 BPM | RESPIRATION RATE: 20 BRPM

## 2018-02-28 VITALS
DIASTOLIC BLOOD PRESSURE: 62 MMHG | RESPIRATION RATE: 20 BRPM | SYSTOLIC BLOOD PRESSURE: 106 MMHG | TEMPERATURE: 98.1 F | HEART RATE: 70 BPM | OXYGEN SATURATION: 95 %

## 2018-02-28 LAB
BUN SERPL-MCNC: 20 MG/DL (ref 7–18)
CALCIUM SERPL-MCNC: 8.4 MG/DL (ref 8.5–10.1)
CHLORIDE SERPL-SCNC: 107 MEQ/L (ref 98–107)
CREAT SERPL-MCNC: 1.18 MG/DL (ref 0.5–1)
GFR SERPLBLD BASED ON 1.73 SQ M-ARVRAT: 50 ML/MIN (ref 89–?)
GLUCOSE SERPL-MCNC: 114 MG/DL (ref 74–106)
HCO3 BLD-SCNC: 17.8 MEQ/L (ref 21–32)
SODIUM SERPL-SCNC: 135 MEQ/L (ref 136–145)

## 2018-02-28 RX ADMIN — GABAPENTIN SCH MG: 400 CAPSULE ORAL at 05:51

## 2018-02-28 RX ADMIN — OXYCODONE HYDROCHLORIDE AND ACETAMINOPHEN PRN TAB: 10; 325 TABLET ORAL at 09:25

## 2018-02-28 RX ADMIN — GABAPENTIN SCH MG: 400 CAPSULE ORAL at 12:12

## 2018-02-28 RX ADMIN — HEPARIN SODIUM SCH UNITS: 10000 INJECTION, SOLUTION INTRAVENOUS; SUBCUTANEOUS at 09:25

## 2018-02-28 RX ADMIN — IPRATROPIUM BROMIDE AND ALBUTEROL SULFATE SCH AMPULE: .5; 3 SOLUTION RESPIRATORY (INHALATION) at 02:31

## 2018-02-28 RX ADMIN — IPRATROPIUM BROMIDE AND ALBUTEROL SULFATE SCH AMPULE: .5; 3 SOLUTION RESPIRATORY (INHALATION) at 08:18

## 2018-02-28 RX ADMIN — IPRATROPIUM BROMIDE AND ALBUTEROL SULFATE SCH AMPULE: .5; 3 SOLUTION RESPIRATORY (INHALATION) at 15:50

## 2018-02-28 RX ADMIN — OXYCODONE HYDROCHLORIDE AND ACETAMINOPHEN PRN TAB: 10; 325 TABLET ORAL at 03:05

## 2018-02-28 RX ADMIN — SODIUM CHLORIDE SCH MLS/HR: 234 INJECTION INTRAMUSCULAR; INTRAVENOUS; SUBCUTANEOUS at 00:59

## 2018-02-28 RX ADMIN — SODIUM CHLORIDE SCH MLS/HR: 234 INJECTION INTRAMUSCULAR; INTRAVENOUS; SUBCUTANEOUS at 07:56

## 2018-02-28 NOTE — HHI.PR
Subjective


Remarks


43 y/o female with a history or chronic pain, copd, depression and anxiety 

presented to the ED with complaints of dizziness, fevers and cough for 5 days. 

Patient states she has had a productive cough with yellow sputum for 5 days 

with associated weakness and dizziness. She denies any chest pain. She does 

complain of sob on exertion, more so then normal.





2-28 patient continues to seek pain medications throughout the day


Appears patient has chronic pain issues and is on chronic home pain medications 

and anxiety medications


Patient can be discharged home


Has drug-seeking behavior


Can be discharged on oral antibiotics with albuterol and Symbicort AND 

PREDNISONE AND MUCINEX


Believe patient has a nebulizer at home


STATES I NEED MY PAIN MEDS





Objective


Vitals





Vital Signs








  Date Time  Temp Pulse Resp B/P (MAP) Pulse Ox O2 Delivery O2 Flow Rate FiO2


 


2/28/18 12:05 98.1 70 20 106/62 (77) 95   


 


2/28/18 08:45 97.7 78 20 117/64 (81) 95   


 


2/28/18 05:51 97.4 70 18 112/58 (76) 98   


 


2/28/18 00:15 96.6 67 20 116/86 (96) 96   


 


2/27/18 23:45     96   


 


2/27/18 23:18  64 22 111/80 (90) 95 Room Air  


 


2/27/18 20:20 98.3 64 24 88/52 (64) 100   














I/O      


 


 2/27/18 2/27/18 2/27/18 2/28/18 2/28/18 2/28/18





 07:00 15:00 23:00 07:00 15:00 23:00


 


Intake Total    2950 ml  


 


Balance    2950 ml  


 


      


 


Intake Oral    800 ml  


 


IV Total    2150 ml  


 


# Voids    2  








Result Diagram:  


2/27/18 2125 2/28/18 0950





Other Results





 Laboratory Tests








Test


  2/27/18


21:25 2/28/18


09:50


 


White Blood Count 11.0 TH/MM3  


 


Red Blood Count 4.15 MIL/MM3  


 


Hemoglobin 11.3 GM/DL  


 


Hematocrit 35.5 %  


 


Mean Corpuscular Volume 85.4 FL  


 


Mean Corpuscular Hemoglobin 27.2 PG  


 


Mean Corpuscular Hemoglobin


Concent 31.8 % 


  


 


 


Red Cell Distribution Width 20.3 %  


 


Platelet Count 251 TH/MM3  


 


Mean Platelet Volume 7.9 FL  


 


Neutrophils (%) (Auto) 88.7 %  


 


Lymphocytes (%) (Auto) 6.5 %  


 


Monocytes (%) (Auto) 4.0 %  


 


Eosinophils (%) (Auto) 0.3 %  


 


Basophils (%) (Auto) 0.5 %  


 


Neutrophils # (Auto) 9.8 TH/MM3  


 


Lymphocytes # (Auto) 0.7 TH/MM3  


 


Monocytes # (Auto) 0.4 TH/MM3  


 


Eosinophils # (Auto) 0.0 TH/MM3  


 


Basophils # (Auto) 0.1 TH/MM3  


 


CBC Comment DIFF FINAL  


 


Differential Comment   


 


Prothrombin Time 9.8 SEC  


 


Prothromb Time International


Ratio 1.0 RATIO 


  


 


 


Activated Partial


Thromboplast Time 31.5 SEC 


  


 


 


Blood Urea Nitrogen 26 MG/DL  20 MG/DL 


 


Creatinine 1.78 MG/DL  1.18 MG/DL 


 


Random Glucose 80 MG/DL  114 MG/DL 


 


Calcium Level 9.5 MG/DL  8.4 MG/DL 


 


Magnesium Level 2.4 MG/DL  


 


Sodium Level 131 MEQ/L  135 MEQ/L 


 


Potassium Level 3.7 MEQ/L  3.8 MEQ/L 


 


Chloride Level 101 MEQ/L  107 MEQ/L 


 


Carbon Dioxide Level 18.6 MEQ/L  17.8 MEQ/L 


 


Anion Gap 11 MEQ/L  10 MEQ/L 


 


Estimat Glomerular Filtration


Rate 31 ML/MIN 


  50 ML/MIN 


 


 


Lactic Acid Level 1.7 mmol/L  


 


Total Creatine Kinase 155 U/L  


 


Creatine Kinase MB 0.8 NG/ML  


 


Troponin I


  LESS THAN 0.02


NG/ML 


 


 


B-Type Natriuretic Peptide 48 PG/ML  








Imaging





Last Impressions








Abdomen/Pelvis CT 2/27/18 2120 Signed





Impressions: 





 Service Date/Time:  Tuesday, February 27, 2018 21:52 - CONCLUSION:  1. 

Findings 





 of colonic ileus with large amount of fecal material in the right colon which 





 appears chronic. 2. No evidence of acute abdominal or pelvic process. No 

masses 





 are identified. 3. Ground glass nodule right lower lobe. Followup CT scan in 3 





 months is recommended.     Brown Samayoa MD 


 


Chest X-Ray 2/27/18 2025 Signed





Impressions: 





 Service Date/Time:  Tuesday, February 27, 2018 20:55 - CONCLUSION:  1. Patchy 





 alveolar disease characteristic of edema or pneumonia.       Brown Samayoa MD 








Objective Remarks


GENERAL: Awake alert and oriented talkative and cooperative-appears quite 

comfortable even though she states she is in lots of pain--has chronic pain


SKIN: Warm and dry.


HEAD: Atraumatic. Normocephalic. 


EYES: Pupils equal and round. No scleral icterus. No injection or drainage. 


ENT: No nasal bleeding or discharge.  Mucous membranes pink and moist.


NECK: Trachea midline. No JVD. 


CARDIOVASCULAR: Regular rate and rhythm.  S1-S2 no S3 or S4


RESPIRATORY: No accessory muscle use.  Coarse breath sounds but. Breath sounds 

equal bilaterally. 


GASTROINTESTINAL: Abdomen soft, non-tender, nondistended. Hepatic and splenic 

margins not palpable. 


MUSCULOSKELETAL: Extremities without clubbing, cyanosis, or edema. No obvious 

deformities. 


NEUROLOGICAL: Awake and alert. No obvious cranial nerve deficits.  Motor 

grossly within normal limits. Five out of 5 muscle strength in the arms and 

legs.  Normal speech.


PSYCHIATRIC: INAppropriate mood and affect; insight and judgment ABnormal.  Drug

-seeking behavior


Procedures


NONE


Medications and IVs





Current Medications


Sodium Chloride 1,000 ml @  1,000 mls/hr Q1H IV  Last administered on 2/27/18at 

23:02;  Start 2/27/18 at 20:49;  Stop 2/27/18 at 21:48;  Status DC


Albuterol/ Ipratropium (Duoneb Neb) 1 ampule Q15M INH  Last administered on 2/27 /18at 21:06;  Start 2/27/18 at 21:00;  Stop 2/27/18 at 21:31;  Status DC


Acetaminophen/ Butalbital/ Caffeine (Fioricet 325-50-40) 1 tab ONCE  ONCE PO  

Last administered on 2/27/18at 22:22;  Start 2/27/18 at 22:00;  Stop 2/27/18 at 

22:01;  Status DC


Sodium Chloride 1,000 ml @  1,000 mls/hr Q1H IV  Last administered on 2/27/18at 

23:02;  Start 2/27/18 at 22:15;  Stop 2/27/18 at 23:14;  Status DC


Levofloxacin/ Dextrose 150 ml @  100 mls/hr ONCE  ONCE IV  Last administered on 

2/27/18at 22:22;  Start 2/27/18 at 22:15;  Stop 2/27/18 at 23:44;  Status DC


Fluoxetine HCl (PROzac) 40 mg DAILY PO  Last administered on 2/28/18at 07:55;  

Start 2/28/18 at 09:00


Gabapentin (Neurontin) 800 mg Q6HR PO  Last administered on 2/28/18at 12:12;  

Start 2/28/18 at 00:00


Sodium Chloride (NS Flush) 2 ml UNSCH  PRN IV FLUSH FLUSH AFTER USING IV ACCESS

;  Start 2/27/18 at 23:00


Sodium Chloride (NS Flush) 2 ml BID IV FLUSH ;  Start 2/28/18 at 09:00


Acetaminophen (Tylenol) 650 mg Q4H  PRN PO TEMP > 100.4;  Start 2/27/18 at 23:00


Heparin Sodium (Porcine) (Heparin Inj) 5,000 units Q12H SQ  Last administered 

on 2/28/18at 09:25;  Start 2/27/18 at 23:00


Naloxone HCl (Narcan Inj) 0.4 mg UNSCH  PRN IV PUSH SEE LABEL COMMENTS;  Start 2 /27/18 at 23:00


Magnesium Hydroxide (Milk Of Magnesia Liq) 30 ml Q12H  PRN PO Mild constipation

;  Start 2/27/18 at 23:00


Sennosides (Senokot) 17.2 mg Q12H  PRN PO Moderate constipation;  Start 2/27/18 

at 23:00


Bisacodyl (Dulcolax Supp) 10 mg DAILY  PRN RECTAL SEVERE CONSITIPATION;  Start 2 /27/18 at 23:00


Lactulose (Lactulose Liq) 30 ml DAILY  PRN PO SEVERE CONSITIPATION;  Start 2/27/ 18 at 23:00


Potassium Chloride 10 meq/ Sodium Chloride 1,005 ml @  100 mls/hr Q10H3M IV  

Last administered on 2/28/18at 07:56;  Start 2/27/18 at 23:00


Levofloxacin (Levaquin) 750 mg DAILY PO ;  Start 2/28/18 at 18:00


Albuterol/ Ipratropium (Duoneb Neb) 1 ampule Q2HR NEB  PRN NEB SHORTNESS OF 

BREATH;  Start 2/28/18 at 02:30


Albuterol/ Ipratropium (Duoneb Neb) 1 ampule Q6HR  NEB NEB  Last administered 

on 2/28/18at 08:18;  Start 2/28/18 at 04:00


Clonazepam (KlonoPIN) 1 mg ONCE  ONCE PO  Last administered on 2/28/18at 03:04;

  Start 2/28/18 at 02:30;  Stop 2/28/18 at 02:31;  Status DC


Oxycodone/ Acetaminophen (Percocet  Mg) 1 tab Q6H  PRN PO PAIN Last 

administered on 2/28/18at 09:25;  Start 2/28/18 at 02:30


Budesonide/ Formoterol Fumarate (Symbicort 160-4.5 Mcg Inh) 1 puff Q12HR INH  

Last administered on 2/28/18at 12:11;  Start 2/28/18 at 10:00





A/P


Problem List:  


(1) Community acquired pneumonia


ICD Code:  J18.9 - Pneumonia, unspecified organism


Status:  Acute


(2) Acute kidney injury


ICD Code:  N17.9 - Acute kidney failure, unspecified


Status:  Acute


Assessment and Plan


43 y/o female with a history or chronic pain, copd, depression and anxiety 

presented to the ED with complaints of dizziness, fevers and cough for 5 days.





Community acquired pneumonia, with underlying copd exacerbation


Chest x ray reviewed and shows Patchy alveolar disease bilaterally 

characteristic of edema or pneumonia.  


   -Levaquin PO daily


   -Duonebs


   -Sputum culture ordered





Acute kidney injury, creatine 1.7, baseline .8, likely dehydration


   -IVF for hydration


   -Trend creatine


   -avoid nephrotoxins





Hypotension, likely due to dehydration


   -Hold home medications clonidine and atenolol


   -Monitor vitals





Chronic pain


   -Resume home Percocet, caution with pain meds due to hypotension








Drug-seeking behavior constantly asking for pain medications throughout the 

whole evening and last night and today


CHRONIC ANXIETY-KLONOPIN 1MG TID





DVT prophylaxis: SCDs


Discharge Planning


DC TO HOME TODAY











Roberto Lozano DO Feb 28, 2018 15:09

## 2018-02-28 NOTE — EKG
Date Performed: 02/27/2018       Time Performed: 20:33:22

 

PTAGE:      42 years

 

EKG:      SINUS BRADYCARDIA BORDERLINE ECG

 

PREVIOUS TRACING       : 01/12/2018 16.39 Since the prior tracing, there has been no significant calvillo


 

DOCTOR:   Brown Fuchs  Interpretating Date/Time  02/28/2018 12:28:18

## 2018-02-28 NOTE — HHI.DS
__________________________________________________





Discharge Summary


Admission Date


Feb 27, 2018 at 23:21


Discharge Date:  Feb 28, 2018


Admitting Diagnosis





pneumonia, acute kidney injury





(1) Community acquired pneumonia


ICD Code:  J18.9 - Pneumonia, unspecified organism


Diagnosis:  Principal


Status:  Acute


(2) Acute kidney injury


ICD Code:  N17.9 - Acute kidney failure, unspecified


Diagnosis:  Principal


Status:  Acute


(3) Adjustment disorder with disturbance of emotion


ICD Code:  F43.29 - Adjustment disorder with other symptoms


Diagnosis:  Secondary


Status:  Acute


(4) Mood disorder


ICD Code:  F39 - Unspecified mood [affective] disorder


Diagnosis:  Secondary


Status:  Acute


(5) Psychiatric disorder


ICD Code:  F99 - Mental disorder, not otherwise specified


Diagnosis:  Secondary


Status:  Acute


(6) Seizure disorder


ICD Code:  G40.909 - Epilepsy, unspecified, not intractable, without status 

epilepticus


Diagnosis:  Secondary


Status:  Chronic


(7) Neck pain


ICD Code:  M54.2 - Cervicalgia


Diagnosis:  Secondary


Status:  Acute


(8) Major depressive disorder, recurrent


ICD Code:  F33.9 - Major depressive disorder, recurrent, unspecified


Diagnosis:  Secondary


Status:  Acute


(9) Adjustment disorder with depressed mood


ICD Code:  F43.21 - Adjustment disorder with depressed mood


Diagnosis:  Secondary





(10) Dehydration


ICD Code:  E86.0 - Dehydration


Diagnosis:  Principal





(11) Seizure disorder


ICD Code:  G40.909 - Epilepsy, unspecified, not intractable, without status 

epilepticus


Diagnosis:  Secondary





(12) PNA (pneumonia)


ICD Code:  J18.9 - Pneumonia, unspecified organism


Diagnosis:  Principal


Status:  Acute


(13) Chronic pain


ICD Code:  G89.29 - Other chronic pain


Diagnosis:  Secondary


Status:  Chronic


(14) Drug-seeking behavior


ICD Code:  Z76.5 - Malingerer [conscious simulation]


Diagnosis:  Principal





Procedures


NONE


Brief History - From Admission


43 y/o female with a history or chronic pain, copd, depression and anxiety 

presented to the ED with complaints of dizziness, fevers and cough for 5 days. 

Patient states she has had a productive cough with yellow sputum for 5 days 

with associated weakness and dizziness. She denies any chest pain. She does 

complain of sob on exertion, more so then normal.


CBC/BMP:  


2/27/18 2125 2/28/18 0950





Significant Findings





Laboratory Tests








Test


  2/27/18


21:25 2/28/18


09:50


 


Hemoglobin


  11.3 GM/DL


(11.6-15.3) 


 


 


Mean Corpuscular Hemoglobin


Concent 31.8 %


(32.0-36.0) 


 


 


Red Cell Distribution Width


  20.3 %


(11.6-17.2) 


 


 


Neutrophils (%) (Auto)


  88.7 %


(16.0-70.0) 


 


 


Lymphocytes (%) (Auto)


  6.5 %


(9.0-44.0) 


 


 


Neutrophils # (Auto)


  9.8 TH/MM3


(1.8-7.7) 


 


 


Lymphocytes # (Auto)


  0.7 TH/MM3


(1.0-4.8) 


 


 


Activated Partial


Thromboplast Time 31.5 SEC


(24.3-30.1) 


 


 


Blood Urea Nitrogen


  26 MG/DL


(7-18) 20 MG/DL


(7-18)


 


Creatinine


  1.78 MG/DL


(0.50-1.00) 1.18 MG/DL


(0.50-1.00)


 


Sodium Level


  131 MEQ/L


(136-145) 135 MEQ/L


(136-145)


 


Carbon Dioxide Level


  18.6 MEQ/L


(21.0-32.0) 17.8 MEQ/L


(21.0-32.0)


 


Estimat Glomerular Filtration


Rate 31 ML/MIN


(>89) 50 ML/MIN


(>89)


 


Troponin I


  LESS THAN 0.02


NG/ML 


 


 


Random Glucose


  


  114 MG/DL


()


 


Calcium Level


  


  8.4 MG/DL


(8.5-10.1)








Imaging





Last Impressions








Abdomen/Pelvis CT 2/27/18 2120 Signed





Impressions: 





 Service Date/Time:  Tuesday, February 27, 2018 21:52 - CONCLUSION:  1. 

Findings 





 of colonic ileus with large amount of fecal material in the right colon which 





 appears chronic. 2. No evidence of acute abdominal or pelvic process. No 

masses 





 are identified. 3. Ground glass nodule right lower lobe. Followup CT scan in 3 





 months is recommended.     Brown Samayoa MD 


 


Chest X-Ray 2/27/18 2025 Signed





Impressions: 





 Service Date/Time:  Tuesday, February 27, 2018 20:55 - CONCLUSION:  1. Patchy 





 alveolar disease characteristic of edema or pneumonia.       Brown Samayoa MD 








PE at Discharge


GENERAL: Awake alert and oriented talkative and cooperative-appears quite 

comfortable even though she states she is in lots of pain--has chronic pain


SKIN: Warm and dry.


HEAD: Atraumatic. Normocephalic. 


EYES: Pupils equal and round. No scleral icterus. No injection or drainage. 


ENT: No nasal bleeding or discharge.  Mucous membranes pink and moist.


NECK: Trachea midline. No JVD. 


CARDIOVASCULAR: Regular rate and rhythm.  S1-S2 no S3 or S4


RESPIRATORY: No accessory muscle use.  Coarse breath sounds but. Breath sounds 

equal bilaterally. 


GASTROINTESTINAL: Abdomen soft, non-tender, nondistended. Hepatic and splenic 

margins not palpable. 


MUSCULOSKELETAL: Extremities without clubbing, cyanosis, or edema. No obvious 

deformities. 


NEUROLOGICAL: Awake and alert. No obvious cranial nerve deficits.  Motor 

grossly within normal limits. Five out of 5 muscle strength in the arms and 

legs.  Normal speech.


PSYCHIATRIC: INAppropriate mood and affect; insight and judgment ABnormal.  Drug

-seeking behavior


Hospital Course


43 y/o female with a history or chronic pain, copd, depression and anxiety 

presented to the ED with complaints of dizziness, fevers and cough for 5 days. 

Patient states she has had a productive cough with yellow sputum for 5 days 

with associated weakness and dizziness. She denies any chest pain. She does 

complain of sob on exertion, more so then normal.





2-28 patient continues to seek pain medications throughout the day


Appears patient has chronic pain issues and is on chronic home pain medications 

and anxiety medications


Patient can be discharged home


Has drug-seeking behavior


Can be discharged on oral antibiotics with albuterol and Symbicort


Believe patient has a nebulizer at home


Pt Condition on Discharge:  Fair


Discharge Disposition:  Discharge Home


Discharge Time:  > 30 minutes


Discharge Instructions


DIET: Follow Instructions for:  Heart Healthy Diet


Speech Therapy-Diet Recommends:  Regular


Additional Diet Instructions:  


STOP SMOKING


Activities you can perform:  Regular-No Restrictions (NO DRIVING AS A  

EVER)


Follow up Referrals:  


Pain Management - 2-3 Days


PCP Follow-up - 1 Week





New Medications:  


Budesonide-Formoterol Inh (Symbicort Inh) 160-4.5 Mcg/Act Aero


2 PUFF INH Q12HR, #1 INHALER 0 Refills





Clonazepam (Klonopin) 1 Mg Tab


1 MG PO TID for Anxiety, #20 TAB 0 Refills





Dextromethorphan-Guaifenesin (Mucinex DM)  Mg Tab


1 TAB PO BID PRN for CHEST CONGESTION AND/OR COUGH for 30 Days, #60 TAB 0 

Refills





Methylprednisolone Dosepak (Medrol Dosepak) 4 Mg Dspk


4 MG PO AS DIRECTED for Infection, #1 DSPK 0 Refills


Per Pharmacist direction


 


 TAKE WITH FOOD


Oxycodone-Acetaminophen (Percocet)  mg Tab


1 TAB PO Q6H PRN for PAIN, #20 TAB 0 Refills





Levofloxacin (Levaquin) 750 Mg Tablet


750 MG PO DAILY for Infection, #6 TAB





 


Continued Medications:  


Atenolol (Atenolol) 50 Mg Tab


50 MG PO BID for Blood Pressure Management, #60 TAB 0 Refills





Butalbital-Acetaminophen-Caffeine (Fioricet) Unknown Strength Cap


Unknown Dose PO Q4H PRN for HEADACHE, CAP 0 Refills





Clonazepam (Klonopin) 1 Mg Tab


1 MG PO TID, #90 TAB 0 Refills





Clonidine (Clonidine) 0.2 Mg Tab


0.2 MG PO TID for Blood Pressure Management, #60 TAB 0 Refills





Fluoxetine (Fluoxetine) 40 Mg Cap


40 CAP PO DAILY, #30 CAP 0 Refills





Gabapentin (Gabapentin) 800 Mg Tab


800 MG PO Q6HR, #90 TAB 0 Refills





Lamotrigine (Lamictal) 25 Mg Tab


20 MG PO HS for Control Seizures, #30 TAB 0 Refills





Omeprazole Magnesium (Prilosec) 20 Mg Tab


40 MG PO BID





Oxycodone-Acetaminophen (Percocet)  mg Tab


1 TAB PO Q6H PRN for PAIN, TAB 0 Refills





Phenobarbital (Phenobarbital) 64.8 Mg Tab


64.8 MG PO TID for Control Seizures, #90 TAB 0 Refills





 


Discontinued Medications:  


Atenolol (Atenolol) 50 Mg Tab


50 MG PO BID for health for 30 Days, #60 TAB





Clonidine (Catapres) 0.2 Mg Tab


0.2 MG PO TID for health for 30 Days, #90 TAB





Fluoxetine (Prozac) 40 Mg Cap


80 CAP PO DAILY, #30 CAP 0 Refills





Gabapentin (Gabapentin) 600 Mg Tab


600 MG PO QID, #90 TAB 0 Refills





Tramadol (Tramadol) 50 Mg Tab


100 MG PO Q6H PRN for PAIN, TAB 0 Refills





Zolpidem (Ambien) 5 Mg Tab


5 MG PO HS PRN for INSOMNIA, TAB 0 Refills

















Roberto Lozano DO Feb 28, 2018 16:22

## 2018-03-12 ENCOUNTER — HOSPITAL ENCOUNTER (INPATIENT)
Dept: HOSPITAL 17 - NEDAMB | Age: 43
LOS: 4 days | Discharge: HOME | DRG: 193 | End: 2018-03-16
Attending: FAMILY MEDICINE | Admitting: FAMILY MEDICINE
Payer: SELF-PAY

## 2018-03-12 VITALS
SYSTOLIC BLOOD PRESSURE: 121 MMHG | HEART RATE: 86 BPM | OXYGEN SATURATION: 95 % | DIASTOLIC BLOOD PRESSURE: 62 MMHG | RESPIRATION RATE: 20 BRPM

## 2018-03-12 VITALS
HEART RATE: 80 BPM | SYSTOLIC BLOOD PRESSURE: 110 MMHG | OXYGEN SATURATION: 97 % | RESPIRATION RATE: 16 BRPM | TEMPERATURE: 98.6 F | DIASTOLIC BLOOD PRESSURE: 64 MMHG

## 2018-03-12 VITALS — HEIGHT: 64 IN | BODY MASS INDEX: 33.84 KG/M2 | WEIGHT: 198.2 LBS

## 2018-03-12 VITALS — OXYGEN SATURATION: 97 %

## 2018-03-12 VITALS — OXYGEN SATURATION: 85 %

## 2018-03-12 DIAGNOSIS — R07.9: ICD-10-CM

## 2018-03-12 DIAGNOSIS — F43.10: ICD-10-CM

## 2018-03-12 DIAGNOSIS — J18.9: Primary | ICD-10-CM

## 2018-03-12 DIAGNOSIS — K21.9: ICD-10-CM

## 2018-03-12 DIAGNOSIS — R07.81: ICD-10-CM

## 2018-03-12 DIAGNOSIS — F17.210: ICD-10-CM

## 2018-03-12 DIAGNOSIS — R04.0: ICD-10-CM

## 2018-03-12 DIAGNOSIS — G89.29: ICD-10-CM

## 2018-03-12 DIAGNOSIS — J44.1: ICD-10-CM

## 2018-03-12 DIAGNOSIS — G47.00: ICD-10-CM

## 2018-03-12 DIAGNOSIS — J96.01: ICD-10-CM

## 2018-03-12 DIAGNOSIS — F32.9: ICD-10-CM

## 2018-03-12 DIAGNOSIS — D64.9: ICD-10-CM

## 2018-03-12 DIAGNOSIS — M19.90: ICD-10-CM

## 2018-03-12 DIAGNOSIS — Z76.5: ICD-10-CM

## 2018-03-12 DIAGNOSIS — J44.0: ICD-10-CM

## 2018-03-12 DIAGNOSIS — I10: ICD-10-CM

## 2018-03-12 LAB
BASOPHILS # BLD AUTO: 0.1 TH/MM3 (ref 0–0.2)
BASOPHILS NFR BLD: 0.7 % (ref 0–2)
BUN SERPL-MCNC: 9 MG/DL (ref 7–18)
CALCIUM SERPL-MCNC: 9.4 MG/DL (ref 8.5–10.1)
CHLORIDE SERPL-SCNC: 106 MEQ/L (ref 98–107)
CREAT SERPL-MCNC: 0.79 MG/DL (ref 0.5–1)
EOSINOPHIL # BLD: 0.3 TH/MM3 (ref 0–0.4)
EOSINOPHIL NFR BLD: 1.4 % (ref 0–4)
ERYTHROCYTE [DISTWIDTH] IN BLOOD BY AUTOMATED COUNT: 21.3 % (ref 11.6–17.2)
GFR SERPLBLD BASED ON 1.73 SQ M-ARVRAT: 80 ML/MIN (ref 89–?)
GLUCOSE SERPL-MCNC: 101 MG/DL (ref 74–106)
HCO3 BLD-SCNC: 20.9 MEQ/L (ref 21–32)
HCT VFR BLD CALC: 33.1 % (ref 35–46)
HGB BLD-MCNC: 10.7 GM/DL (ref 11.6–15.3)
LYMPHOCYTES # BLD AUTO: 0.8 TH/MM3 (ref 1–4.8)
LYMPHOCYTES NFR BLD AUTO: 4.3 % (ref 9–44)
MCH RBC QN AUTO: 27.9 PG (ref 27–34)
MCHC RBC AUTO-ENTMCNC: 32.4 % (ref 32–36)
MCV RBC AUTO: 86.1 FL (ref 80–100)
MONOCYTE #: 0.6 TH/MM3 (ref 0–0.9)
MONOCYTES NFR BLD: 3.3 % (ref 0–8)
NEUTROPHILS # BLD AUTO: 16 TH/MM3 (ref 1.8–7.7)
NEUTROPHILS NFR BLD AUTO: 90.3 % (ref 16–70)
PLATELET # BLD: 430 TH/MM3 (ref 150–450)
PMV BLD AUTO: 7.5 FL (ref 7–11)
RBC # BLD AUTO: 3.85 MIL/MM3 (ref 4–5.3)
SODIUM SERPL-SCNC: 138 MEQ/L (ref 136–145)
WBC # BLD AUTO: 17.7 TH/MM3 (ref 4–11)

## 2018-03-12 PROCEDURE — G0378 HOSPITAL OBSERVATION PER HR: HCPCS

## 2018-03-12 PROCEDURE — 94640 AIRWAY INHALATION TREATMENT: CPT

## 2018-03-12 PROCEDURE — 85027 COMPLETE CBC AUTOMATED: CPT

## 2018-03-12 PROCEDURE — 94664 DEMO&/EVAL PT USE INHALER: CPT

## 2018-03-12 PROCEDURE — 36600 WITHDRAWAL OF ARTERIAL BLOOD: CPT

## 2018-03-12 PROCEDURE — 71046 X-RAY EXAM CHEST 2 VIEWS: CPT

## 2018-03-12 PROCEDURE — 96375 TX/PRO/DX INJ NEW DRUG ADDON: CPT

## 2018-03-12 PROCEDURE — 85025 COMPLETE CBC W/AUTO DIFF WBC: CPT

## 2018-03-12 PROCEDURE — 93005 ELECTROCARDIOGRAM TRACING: CPT

## 2018-03-12 PROCEDURE — 76937 US GUIDE VASCULAR ACCESS: CPT

## 2018-03-12 PROCEDURE — 82805 BLOOD GASES W/O2 SATURATION: CPT

## 2018-03-12 PROCEDURE — 94618 PULMONARY STRESS TESTING: CPT

## 2018-03-12 PROCEDURE — 87804 INFLUENZA ASSAY W/OPTIC: CPT

## 2018-03-12 PROCEDURE — 85018 HEMOGLOBIN: CPT

## 2018-03-12 PROCEDURE — 80048 BASIC METABOLIC PNL TOTAL CA: CPT

## 2018-03-12 PROCEDURE — 96365 THER/PROPH/DIAG IV INF INIT: CPT

## 2018-03-12 RX ADMIN — ALBUTEROL SULFATE SCH MG: 2.5 SOLUTION RESPIRATORY (INHALATION) at 22:00

## 2018-03-12 RX ADMIN — ALBUTEROL SULFATE SCH MG: 2.5 SOLUTION RESPIRATORY (INHALATION) at 22:15

## 2018-03-12 RX ADMIN — IPRATROPIUM BROMIDE AND ALBUTEROL SULFATE SCH AMPULE: .5; 3 SOLUTION RESPIRATORY (INHALATION) at 22:38

## 2018-03-12 NOTE — PD
HPI


Chief Complaint:  Respiratory Symptoms


Time Seen by Provider:  21:33


Travel History


International Travel<30 days:  No


Contact w/Intl Traveler<30days:  No


Traveled to known affect area:  No





History of Present Illness


HPI


Patient has had 4 days of shortness of breath, cough that is occasionally 

productive.  The patient's symptoms are not resolving with her home nebulizer 

treatments.  It has affected her to such extent that she has not been smoking 

over the past 4 days.  However she is a regular every day smoker.  Patient also 

states that over the last 2 days she has developed chest pain described as sharp

, nonradiating, 7 out of 10, worse with a deep breath, and worse with coughing.

  No apparent alleviating or aggravating factors.  Patient denies any 

associated factors such as fever, runny nose, back pain, abdominal pain, or 

headache.











Apparent allergy to sulfa, Tylenol, Z-Kishore, Flexeril, erythromycin, Haldol, 

Zofran, propoxyphene and sumatriptan.


Past medical history significant for seizures, migraine, hypertension, COPD, 

appendectomy, ulcer, , bipolar, PTSD.





PFSH


Past Medical History


Hx Anticoagulant Therapy:  No


Arthritis:  Yes (knees and neck)


Autoimmune Disease:  No


Blood Disorders:  No


Bipolar Disorder:  Yes (DENIES)


Anxiety:  Yes


Depression:  Yes


Heart Rhythm Problems:  Yes (tachycardic arrythmia)


Cancer:  No


Cardiovascular Problems:  Yes


High Cholesterol:  No


Chemotherapy:  No


Chest Pain:  No


Congestive Heart Failure:  No


COPD:  Yes


Diabetes:  No


Diminished Hearing:  No


Endocrine:  No


Gastrointestinal Disorders:  Yes (bleeding ulcers)


GERD:  Yes


Genitourinary:  No


Hypertension:  Yes


Immune Disorder:  No


Insomnia:  Yes


Musculoskeletal:  Yes


Neurologic:  Yes


Psychiatric:  Yes


Reproductive:  No


Respiratory:  Yes


Migraines:  Yes


Radiation Therapy:  No


Seizures:  Yes


Thyroid Disease:  No


Ulcer:  Yes


Pregnant?:  Not Pregnant


:  4


Para:  2


Miscarriage:  2





Past Surgical History


Abdominal Surgery:  No


Appendectomy:  Yes


Cardiac Surgery:  No


 Section:  Yes


Ear Surgery:  No


Endocrine Surgery:  No


Eye Surgery:  No


Genitourinary Surgery:  Yes (laproscopic endometriosis repair)


Gynecologic Surgery:  Yes ( x2)


Hysterectomy:  Yes


Neurologic Surgery:  Yes (Cervical Fusionx2)


Oral Surgery:  No


Pacemaker:  No


Thoracic Surgery:  No


Other Surgery:  Yes





Social History


Alcohol Use:  No


Tobacco Use:  Yes (1 cig daily)


Substance Use:  No





Allergies-Medications


(Allergen,Severity, Reaction):  


Coded Allergies:  


     azithromycin (Verified  Allergy, Severe, 3/12/18)


     ondansetron (Unverified  Allergy, Intermediate, VOMITING, 3/12/18)


     Sulfa (Sulfonamide Antibiotics) (Unverified  Adverse Reaction, Intermediate

, 3/12/18)


     acetaminophen (Unverified  Adverse Reaction, Intermediate, 3/12/18)


     cyclobenzaprine (Unverified  Adverse Reaction, Intermediate, 3/12/18)


     erythromycin base (Unverified  Adverse Reaction, Intermediate, 3/12/18)


     haloperidol (Unverified  Adverse Reaction, Intermediate, 3/12/18)


     propoxyphene (Unverified  Adverse Reaction, Intermediate, 3/12/18)


     sumatriptan (Unverified  Adverse Reaction, Intermediate, 3/12/18)


Reported Meds & Prescriptions





Reported Meds & Active Scripts


Active


Klonopin (Clonazepam) 1 Mg Tab 1 Mg PO TID


Symbicort Inh (Budesonide/Formoterol Fumarate) 160-4.5 Mcg/Act Aero 2 Puff INH 

Q12HR


Reported


Prilosec (Omeprazole Magnesium) 20 Mg Tab 40 Mg PO BID


Percocet (Oxycodone-Acetaminophen)  mg Tab 1 Tab PO Q6H PRN


Fioricet (Butalbital-Acetaminophen-Caffeine) Unknown Strength Cap Unknown Dose 

PO Q4H PRN


Klonopin (Clonazepam) 1 Mg Tab 1 Mg PO TID


Gabapentin 800 Mg Tab 800 Mg PO Q6HR


Fluoxetine (Fluoxetine HCl) 40 Mg Cap 40 Cap PO DAILY


Atenolol 50 Mg Tab 50 Mg PO BID


Clonidine (Clonidine HCl) 0.2 Mg Tab 0.2 Mg PO TID








Review of Systems


General / Constitutional:  No: Fever


Eyes:  No: Visual changes


HENT:  No: Headaches


Cardiovascular:  No: Chest Pain or Discomfort


Respiratory:  Positive: Shortness of Breath


Gastrointestinal:  No: Abdominal Pain


Genitourinary:  No: Dysuria


Musculoskeletal:  No: Pain


Skin:  No Rash


Neurologic:  No: Weakness


Psychiatric:  No: Depression


Endocrine:  No: Polydipsia


Hematologic/Lymphatic:  No: Easy Bruising





Physical Exam


Narrative


GENERAL: 


SKIN: Warm and dry.


HEAD: Atraumatic. Normocephalic. 


EYES: Pupils equal and round. No scleral icterus. No injection or drainage. 


ENT: No nasal bleeding or discharge.  Mucous membranes pink and moist.


NECK: Trachea midline. No JVD. 


CARDIOVASCULAR: Regular rate and rhythm.  


RESPIRATORY: Supraclavicular accessory muscle use.  Bilateral wheezing,.  

Decreased tidal volume bilaterally  


GASTROINTESTINAL: Abdomen soft, non-tender, nondistended. 


MUSCULOSKELETAL: Extremities without clubbing, cyanosis, or edema. No obvious 

deformities. 


NEUROLOGICAL: Awake and alert. No obvious cranial nerve deficits.  Motor 

grossly within normal limits. Five out of 5 muscle strength in the arms and 

legs.  Normal speech.


PSYCHIATRIC: Appropriate mood and affect; insight and judgment normal.





Data


Data


Last Documented VS





Vital Signs








  Date Time  Temp Pulse Resp B/P (MAP) Pulse Ox O2 Delivery O2 Flow Rate FiO2


 


3/12/18 21:15     85 Room Air  


 


3/12/18 19:36  74 20    2.00 


 


3/12/18 18:26 98.6   110/64 (79)    








Orders





 Orders


Complete Blood Count With Diff (3/12/18 19:01)


Basic Metabolic Panel (Bmp) (3/12/18 19:01)


Chest, Pa & Lat (3/12/18 19:)


Iv Access Insert/Monitor (3/12/18 19:01)


Ecg Monitoring (3/12/18 19:01)


Oxygen Administration (3/12/18 19:)


Oximetry (3/12/18 19:)


Electrocardiogram (3/12/18 19:)


Albuterol-Ipratropium Neb (Duoneb Neb) (3/12/18 21:30)


Arterial Blood Gas (Abg) (3/12/18 21:46)


Influenzae A/B Antigen (3/12/18 21:46)


Methylprednisolone So Succ Inj (Solumedr (3/12/18 22:00)


Albuterol Neb (Albuterol Neb) (3/12/18 22:00)


Magnesium Sulfate 1 Gm Premix (Magnesium (3/12/18 22:00)


Clonazepam (Klonopin) (3/12/18 22:15)


Oxycodone-Acetamin 5-325 Mg (Percocet (3/12/18 22:15)


Levofloxacin 750 Mg Premix Inj (Levaquin (3/12/18 22:30)





Labs





Laboratory Tests








Test


  3/12/18


19:40 3/12/18


20:24 3/12/18


22:00


 


Blood Urea Nitrogen 9 MG/DL   


 


Creatinine 0.79 MG/DL   


 


Random Glucose 101 MG/DL   


 


Calcium Level 9.4 MG/DL   


 


Sodium Level 138 MEQ/L   


 


Potassium Level 4.0 MEQ/L   


 


Chloride Level 106 MEQ/L   


 


Carbon Dioxide Level 20.9 MEQ/L   


 


Anion Gap 11 MEQ/L   


 


Estimat Glomerular Filtration


Rate 80 ML/MIN 


  


  


 


 


White Blood Count  17.7 TH/MM3  


 


Red Blood Count  3.85 MIL/MM3  


 


Hemoglobin  10.7 GM/DL  


 


Hematocrit  33.1 %  


 


Mean Corpuscular Volume  86.1 FL  


 


Mean Corpuscular Hemoglobin  27.9 PG  


 


Mean Corpuscular Hemoglobin


Concent 


  32.4 % 


  


 


 


Red Cell Distribution Width  21.3 %  


 


Platelet Count  430 TH/MM3  


 


Mean Platelet Volume  7.5 FL  


 


Neutrophils (%) (Auto)  90.3 %  


 


Lymphocytes (%) (Auto)  4.3 %  


 


Monocytes (%) (Auto)  3.3 %  


 


Eosinophils (%) (Auto)  1.4 %  


 


Basophils (%) (Auto)  0.7 %  


 


Neutrophils # (Auto)  16.0 TH/MM3  


 


Lymphocytes # (Auto)  0.8 TH/MM3  


 


Monocytes # (Auto)  0.6 TH/MM3  


 


Eosinophils # (Auto)  0.3 TH/MM3  


 


Basophils # (Auto)  0.1 TH/MM3  


 


CBC Comment  DIFF FINAL  


 


Differential Comment    


 


Blood Gas Puncture Site   RT RADIAL 


 


Blood Gas Patient Temperature   98.6 


 


Blood Gas HCO3   20 mmol/L 


 


Blood Gas Base Excess   -3.6 mmol/L 


 


Blood Gas Oxygen Saturation   90 % 


 


Arterial Blood pH   7.45 


 


Arterial Blood Partial


Pressure CO2 


  


  29 mmHg 


 


 


Arterial Blood Partial


Pressure O2 


  


  67 mmHG 


 


 


Arterial Blood Oxygen Content   13.3 Vol % 


 


Arterial Blood


Carboxyhemoglobin 


  


  1.1 % 


 


 


Arterial Blood Methemoglobin   1.5 % 


 


Blood Gas Hemoglobin   10.5 G/DL 


 


Oxygen Delivery Device   ROOM AIR 


 


Blood Gas Inspired Oxygen   21 % 











[facility]


Medical Decision Making


Medical Screen Exam Complete:  Yes


Emergency Medical Condition:  Yes


Medical Record Reviewed:  Yes


Interpretation(s)


ABG performed after nebulizer treatments but on room air reveal a pH of 7.45/

PCO2 of 28/PaO2 of 67 consistent with hypoxemia without CO2 retention


Differential Diagnosis


Pneumonia, versus pneumothorax, versus COPD exacerbation versus pleural 

effusion versus pulmonary edema


Narrative Course


CBC shows leukocytosis of 18,000, with a left shift neutrophilia of 90%, mild 

anemia of 10/33 with a normal platelet count


Metabolic profile comes back with normal electrolytes, normal kidney functions.





Diagnosis





 Primary Impression:  


 COPD exacerbation with hypoxemia


 Additional Impression:  


 PNA (pneumonia)


 Qualified Codes:  J18.9 - Pneumonia, unspecified organism











Seven Bartlett MD Mar 12, 2018 21:36

## 2018-03-12 NOTE — HHI.HP
__________________________________________________





[facility]


Service


[facility]


Primary Care Physician


Unknown


Admission Diagnosis





pneumonia, acute kidney injury


Diagnoses:  


Travel History


International Travel<30 Days:  No


Contact w/Intl Traveler <30 Da:  No


Traveled to Known Affected Are:  No


History of Present Illness


43 y/o female with a history or chronic pain, copd, depression and anxiety 

presented to the ED with complaints of dizziness, fevers and cough for 5 days. 

Patient states she has had a productive cough with yellow sputum for 5 days 

with associated weakness and dizziness. She denies any chest pain. She does 

complain of sob on exertion, more so then normal.





Past Family Social History


Past Medical History


Anxiety, Depression, COPD and Tobacco Abuse


Past Surgical History


, Hysterectomy, Cervical Fusion


Reported Medications





Reported Meds & Active Scripts


Active


Atenolol 50 Mg Tab 50 Mg PO BID 30 Days


Catapres (Clonidine) 0.2 Mg Tab 0.2 Mg PO TID 30 Days


Reported


Ambien (Zolpidem Tartrate) 5 Mg Tab 5 Mg PO HS PRN


Tramadol (Tramadol HCl) 50 Mg Tab 100 Mg PO Q6H PRN


Percocet (Oxycodone-Acetaminophen)  mg Tab 1 Tab PO Q6H PRN


Fioricet (Butalbital-Acetaminophen-Caffeine) Unknown Strength Cap Unknown Dose 

PO Q4H PRN


Klonopin (Clonazepam) 1 Mg Tab 1 Mg PO TID


Gabapentin 800 Mg Tab 800 Mg PO Q6HR


Fluoxetine (Fluoxetine HCl) 40 Mg Cap 40 Cap PO DAILY


Allergies:  


Coded Allergies:  


     azithromycin (Verified  Allergy, Severe, 18)


     ondansetron (Unverified  Allergy, Intermediate, VOMITING, 8/15/17)


     Sulfa (Sulfonamide Antibiotics) (Unverified  Adverse Reaction, Intermediate

, 8/15/17)


     acetaminophen (Unverified  Adverse Reaction, Intermediate, 8/15/17)


     cyclobenzaprine (Unverified  Adverse Reaction, Intermediate, 8/15/17)


     erythromycin base (Unverified  Adverse Reaction, Intermediate, 8/15/17)


     haloperidol (Unverified  Adverse Reaction, Intermediate, 8/15/17)


     propoxyphene (Unverified  Adverse Reaction, Intermediate, 8/15/17)


     sumatriptan (Unverified  Adverse Reaction, Intermediate, 8/15/17)


Active Ordered Medications





Current Medications








 Medications


  (Trade)  Dose


 Ordered  Sig/Joyce


 Route  Start Time


 Stop Time Status Last Admin


 


  (PROzac)  40 mg  DAILY


 PO  18 09:00


     


 


 


  (Neurontin)  800 mg  Q6HR


 PO  18 00:00


    18 23:37


 


 


  (NS Flush)  2 ml  UNSCH  PRN


 IV FLUSH  18 23:00


     


 


 


  (NS Flush)  2 ml  BID


 IV FLUSH  18 09:00


     


 


 


  (Tylenol)  650 mg  Q4H  PRN


 PO  18 23:00


     


 


 


  (Heparin Inj)  5,000 units  Q12H


 SQ  18 23:00


    18 23:36


 


 


  (Narcan Inj)  0.4 mg  UNSCH  PRN


 IV PUSH  18 23:00


     


 


 


  (Milk Of


 Magnesia Liq)  30 ml  Q12H  PRN


 PO  18 23:00


     


 


 


  (Senokot)  17.2 mg  Q12H  PRN


 PO  18 23:00


     


 


 


  (Dulcolax Supp)  10 mg  DAILY  PRN


 RECTAL  18 23:00


     


 


 


  (Lactulose Liq)  30 ml  DAILY  PRN


 PO  18 23:00


     


 


 


 Potassium


 Chloride 10 meq/


 Sodium Chloride  1,005 ml @ 


 100 mls/hr  Q10H3M


 IV  18 23:00


    18 00:59


 


 


  (Levaquin)  750 mg  DAILY


 PO  18 18:00


     


 


 


  (Duoneb Neb)  1 ampule  Q2HR NEB  PRN


 NEB  18 02:30


   UNV  


 


 


  (Duoneb Neb)  1 ampule  Q6HR  NEB


 NEB  18 04:00


   UNV  


 








Family History


Patient denies any family history


Social History


Tobacco use: 1 PPD


Alcohol use: Denies





Physical Exam


Vital Signs





Vital Signs








  Date Time  Temp Pulse Resp B/P (MAP) Pulse Ox O2 Delivery O2 Flow Rate FiO2


 


18 20:20 98.3 64 24 88/52 (64) 100   








Physical Exam


GENERAL: Middle-aged female in no acute distress.


HEENT: PERRLA, EOMI. No scleral icterus or conjunctival pallor. No lid lag or 

facial droop.  


CARDIOVASCULAR: Regular rate and rhythm.  No obvious murmurs to auscultation. 

No chest tenderness to palpation. 


RESPIRATORY: Scattered rhonchi and wheezing.  


GASTROINTESTINAL: Abdomen soft, mild epigastric tenderness palpation, 

nondistended. BS normal. 


MUSCULOSKELETAL: Extremities without clubbing, cyanosis, or edema. No obvious 

deformities. 


NEUROLOGICAL: Awake, alert and oriented x4. No focal neurologic deficits. 

Moving both upper and lower extremities spontaneously.


Laboratory





Laboratory Tests








Test


  18


21:25


 


White Blood Count 11.0 


 


Red Blood Count 4.15 


 


Hemoglobin 11.3 


 


Hematocrit 35.5 


 


Mean Corpuscular Volume 85.4 


 


Mean Corpuscular Hemoglobin 27.2 


 


Mean Corpuscular Hemoglobin


Concent 31.8 


 


 


Red Cell Distribution Width 20.3 


 


Platelet Count 251 


 


Mean Platelet Volume 7.9 


 


Neutrophils (%) (Auto) 88.7 


 


Lymphocytes (%) (Auto) 6.5 


 


Monocytes (%) (Auto) 4.0 


 


Eosinophils (%) (Auto) 0.3 


 


Basophils (%) (Auto) 0.5 


 


Neutrophils # (Auto) 9.8 


 


Lymphocytes # (Auto) 0.7 


 


Monocytes # (Auto) 0.4 


 


Eosinophils # (Auto) 0.0 


 


Basophils # (Auto) 0.1 


 


CBC Comment DIFF FINAL 


 


Differential Comment  


 


Prothrombin Time 9.8 


 


Prothromb Time International


Ratio 1.0 


 


 


Activated Partial


Thromboplast Time 31.5 


 


 


Blood Urea Nitrogen 26 


 


Creatinine 1.78 


 


Random Glucose 80 


 


Calcium Level 9.5 


 


Magnesium Level 2.4 


 


Sodium Level 131 


 


Potassium Level 3.7 


 


Chloride Level 101 


 


Carbon Dioxide Level 18.6 


 


Anion Gap 11 


 


Estimat Glomerular Filtration


Rate 31 


 


 


Lactic Acid Level 1.7 


 


Total Creatine Kinase 155 


 


Creatine Kinase MB 0.8 


 


Troponin I LESS THAN 0.02 


 


B-Type Natriuretic Peptide 48 














 Date/Time


Source Procedure


Growth Status


 


 


 18 21:20


Blood Peripheral Aerobic Blood Culture


Pending Received


 


 18 21:20


Blood Peripheral Anaerobic Blood Culture


Pending Received


 


 18 22:00


Nasal Aspirate Influenza Types A,B Antigen (ANA) - Final


NEGATIVE FOR FLU A AND B ANTIGEN.... Complete








Result Diagram:  


18





Imaging





Last Impressions








Abdomen/Pelvis CT 18 Signed





Impressions: 





 Service Date/Time:  2018 21:52 - CONCLUSION:  1. 

Findings 





 of colonic ileus with large amount of fecal material in the right colon which 





 appears chronic. 2. No evidence of acute abdominal or pelvic process. No 

masses 





 are identified. 3. Ground glass nodule right lower lobe. Followup CT scan in 3 





 months is recommended.     Brown Samayoa MD 


 


Chest X-Ray 18 Signed





Impressions: 





 Service Date/Time:  2018 20:55 - CONCLUSION:  1. Patchy 





 alveolar disease characteristic of edema or pneumonia.       Brown Samayoa MD 











Caprini VTE Risk Assessment


Caprini VTE Risk Assessment:  No/Low Risk (score <= 1)


Caprini Risk Assessment Model











 Point Value = 1          Point Value = 2  Point Value = 3  Point Value = 5


 


Age 41-60


Minor surgery


BMI > 25 kg/m2


Swollen legs


Varicose veins


Pregnancy or postpartum


History of unexplained or recurrent


   spontaneous 


Oral contraceptives or hormone


   replacement


Sepsis (< 1 month)


Serious lung disease, including


   pneumonia (< 1 month)


Abnormal pulmonary function


Acute myocardial infarction


Congestive heart failure (< 1 month)


History of inflammatory bowel disease


Medical patient at bed rest Age 61-74


Arthroscopic surgery


Major open surgery (> 45 min)


Laparoscopic surgery (> 45 min)


Malignancy


Confined to bed (> 72 hours)


Immobilizing plaster cast


Central venous access Age >= 75


History of VTE


Family history of VTE


Factor V Leiden


Prothrombin 07679C


Lupus anticoagulant


Anticardiolipin antibodies


Elevated serum homocysteine


Heparin-induced thrombocytopenia


Other congenital or acquired


   thrombophilia Stroke (< 1 month)


Elective arthroplasty


Hip, pelvis, or leg fracture


Acute spinal cord injury (< 1 month)








Prophylaxis Regimen











   Total Risk


Factor Score Risk Level Prophylaxis Regimen


 


0-1      Low Early ambulation


 


2 Moderate Order ONE of the following:


*Sequential Compression Device (SCD)


*Heparin 5000 units SQ BID


 


3-4 Higher Order ONE of the following medications:


*Heparin 5000 units SQ TID


*Enoxaparin/Lovenox 40 mg SQ daily (WT < 150 kg, CrCl > 30 mL/min)


*Enoxaparin/Lovenox 30 mg SQ daily (WT < 150 kg, CrCl > 10-29 mL/min)


*Enoxaparin/Lovenox 30 mg SQ BID (WT < 150 kg, CrCl > 30 mL/min)


AND/OR


*Sequential Compression Device (SCD)


 


5 or more Highest Order ONE of the following medications:


*Heparin 5000 units SQ TID (Preferred with Epidurals)


*Enoxaparin/Lovenox 40 mg SQ daily (WT < 150 kg, CrCl > 30 mL/min)


*Enoxaparin/Lovenox 30 mg SQ daily (WT < 150 kg, CrCl > 10-29 mL/min)


*Enoxaparin/Lovenox 30 mg SQ BID (WT < 150 kg, CrCl > 30 mL/min)


AND


*Sequential Compression Device (SCD)











Assessment and Plan


Problem List:  


(1) Community acquired pneumonia


ICD Code:  J18.9 - Pneumonia, unspecified organism


Status:  Acute


(2) Acute kidney injury


ICD Code:  N17.9 - Acute kidney failure, unspecified


Status:  Acute


Assessment and Plan


43 y/o female with a history or chronic pain, copd, depression and anxiety 

presented to the ED with complaints of dizziness, fevers and cough for 5 days.





Community acquired pneumonia, with underlying copd exacerbation


Chest x ray reviewed and shows Patchy alveolar disease bilaterally 

characteristic of edema or pneumonia.  


   -Levaquin PO daily


   -Duonebs scheduled and prn


   -Sputum culture ordered





Acute kidney injury, creatine 1.7, baseline .8, likely dehydration


   -IVF for hydration


   -Trend creatine


   -avoid nephrotoxins





Hypotension, likely due to dehydration and/or polypharmacy


   -Hold home medications clonidine and atenolol


   -Monitor vitals





Chronic pain, patient is on multiple medications at home


   -Resume home Percocet, caution with pain meds due to hypotension





DVT prophylaxis: SCDs


Discussed Condition With


Patient and RN





Physician Certification


2 Midnight Certification Type:  Admission for Inpatient Services


Order for Inpatient Services


The services are ordered in accordance with Medicare regulations or non-

Medicare payer requirements, as applicable.  In the case of services not 

specified as inpatient-only, they are appropriately provided as inpatient 

services in accordance with the 2-midnight benchmark.


Estimated LOS (days):  2


 days is the estimated time the patient will need to remain in the hospital, 

assuming treatment plan goals are met and no additional complications.


Post-Hospital Plan:  Evelyn Garnett 2018 23:21
unintentional

## 2018-03-13 VITALS
DIASTOLIC BLOOD PRESSURE: 81 MMHG | TEMPERATURE: 96.4 F | OXYGEN SATURATION: 95 % | RESPIRATION RATE: 24 BRPM | SYSTOLIC BLOOD PRESSURE: 128 MMHG | HEART RATE: 71 BPM

## 2018-03-13 VITALS
HEART RATE: 69 BPM | OXYGEN SATURATION: 94 % | SYSTOLIC BLOOD PRESSURE: 142 MMHG | TEMPERATURE: 97.6 F | DIASTOLIC BLOOD PRESSURE: 85 MMHG | RESPIRATION RATE: 16 BRPM

## 2018-03-13 VITALS
TEMPERATURE: 96.5 F | OXYGEN SATURATION: 92 % | HEART RATE: 67 BPM | DIASTOLIC BLOOD PRESSURE: 105 MMHG | RESPIRATION RATE: 20 BRPM | SYSTOLIC BLOOD PRESSURE: 158 MMHG

## 2018-03-13 VITALS
TEMPERATURE: 96.2 F | OXYGEN SATURATION: 95 % | RESPIRATION RATE: 24 BRPM | SYSTOLIC BLOOD PRESSURE: 134 MMHG | DIASTOLIC BLOOD PRESSURE: 71 MMHG | HEART RATE: 70 BPM

## 2018-03-13 VITALS
SYSTOLIC BLOOD PRESSURE: 186 MMHG | OXYGEN SATURATION: 95 % | HEART RATE: 59 BPM | TEMPERATURE: 97.7 F | RESPIRATION RATE: 20 BRPM | DIASTOLIC BLOOD PRESSURE: 120 MMHG

## 2018-03-13 VITALS — OXYGEN SATURATION: 94 %

## 2018-03-13 VITALS — OXYGEN SATURATION: 96 %

## 2018-03-13 VITALS — HEART RATE: 80 BPM

## 2018-03-13 LAB
BASOPHILS # BLD AUTO: 0 TH/MM3 (ref 0–0.2)
BASOPHILS NFR BLD: 0.4 % (ref 0–2)
BUN SERPL-MCNC: 13 MG/DL (ref 7–18)
CALCIUM SERPL-MCNC: 9.1 MG/DL (ref 8.5–10.1)
CHLORIDE SERPL-SCNC: 107 MEQ/L (ref 98–107)
CREAT SERPL-MCNC: 0.78 MG/DL (ref 0.5–1)
EOSINOPHIL # BLD: 0 TH/MM3 (ref 0–0.4)
EOSINOPHIL NFR BLD: 0.1 % (ref 0–4)
ERYTHROCYTE [DISTWIDTH] IN BLOOD BY AUTOMATED COUNT: 21.6 % (ref 11.6–17.2)
GFR SERPLBLD BASED ON 1.73 SQ M-ARVRAT: 81 ML/MIN (ref 89–?)
GLUCOSE SERPL-MCNC: 109 MG/DL (ref 74–106)
HCO3 BLD-SCNC: 23.6 MEQ/L (ref 21–32)
HCT VFR BLD CALC: 34.1 % (ref 35–46)
HGB BLD-MCNC: 11.2 GM/DL (ref 11.6–15.3)
LYMPHOCYTES # BLD AUTO: 0.4 TH/MM3 (ref 1–4.8)
LYMPHOCYTES NFR BLD AUTO: 5.6 % (ref 9–44)
MCH RBC QN AUTO: 28.2 PG (ref 27–34)
MCHC RBC AUTO-ENTMCNC: 33 % (ref 32–36)
MCV RBC AUTO: 85.4 FL (ref 80–100)
MONOCYTE #: 0.2 TH/MM3 (ref 0–0.9)
MONOCYTES NFR BLD: 2.4 % (ref 0–8)
NEUTROPHILS # BLD AUTO: 7.3 TH/MM3 (ref 1.8–7.7)
NEUTROPHILS NFR BLD AUTO: 91.5 % (ref 16–70)
PLATELET # BLD: 410 TH/MM3 (ref 150–450)
PMV BLD AUTO: 7.3 FL (ref 7–11)
RBC # BLD AUTO: 3.99 MIL/MM3 (ref 4–5.3)
SODIUM SERPL-SCNC: 140 MEQ/L (ref 136–145)
WBC # BLD AUTO: 8 TH/MM3 (ref 4–11)

## 2018-03-13 RX ADMIN — Medication SCH MG: at 13:11

## 2018-03-13 RX ADMIN — BUDESONIDE AND FORMOTEROL FUMARATE DIHYDRATE SCH PUFF: 160; 4.5 AEROSOL RESPIRATORY (INHALATION) at 09:58

## 2018-03-13 RX ADMIN — ATENOLOL SCH MG: 50 TABLET ORAL at 20:40

## 2018-03-13 RX ADMIN — OXYCODONE HYDROCHLORIDE AND ACETAMINOPHEN PRN TAB: 10; 325 TABLET ORAL at 14:23

## 2018-03-13 RX ADMIN — PANTOPRAZOLE SCH MG: 40 TABLET, DELAYED RELEASE ORAL at 08:31

## 2018-03-13 RX ADMIN — Medication SCH ML: at 20:41

## 2018-03-13 RX ADMIN — GABAPENTIN SCH MG: 400 CAPSULE ORAL at 05:56

## 2018-03-13 RX ADMIN — OXYCODONE HYDROCHLORIDE AND ACETAMINOPHEN PRN TAB: 10; 325 TABLET ORAL at 20:42

## 2018-03-13 RX ADMIN — HEPARIN SODIUM SCH UNITS: 10000 INJECTION, SOLUTION INTRAVENOUS; SUBCUTANEOUS at 20:40

## 2018-03-13 RX ADMIN — METHYLPREDNISOLONE SODIUM SUCCINATE SCH MG: 40 INJECTION, POWDER, FOR SOLUTION INTRAMUSCULAR; INTRAVENOUS at 17:23

## 2018-03-13 RX ADMIN — IPRATROPIUM BROMIDE AND ALBUTEROL SULFATE SCH AMPULE: .5; 3 SOLUTION RESPIRATORY (INHALATION) at 03:29

## 2018-03-13 RX ADMIN — LEVOFLOXACIN SCH MLS/HR: 5 INJECTION, SOLUTION INTRAVENOUS at 22:31

## 2018-03-13 RX ADMIN — GUAIFENESIN SCH MG: 600 TABLET, EXTENDED RELEASE ORAL at 08:30

## 2018-03-13 RX ADMIN — GABAPENTIN SCH MG: 400 CAPSULE ORAL at 13:12

## 2018-03-13 RX ADMIN — PANTOPRAZOLE SCH MG: 40 TABLET, DELAYED RELEASE ORAL at 20:40

## 2018-03-13 RX ADMIN — IPRATROPIUM BROMIDE AND ALBUTEROL SULFATE SCH AMPULE: .5; 3 SOLUTION RESPIRATORY (INHALATION) at 16:00

## 2018-03-13 RX ADMIN — GUAIFENESIN SCH MG: 600 TABLET, EXTENDED RELEASE ORAL at 20:39

## 2018-03-13 RX ADMIN — BUDESONIDE AND FORMOTEROL FUMARATE DIHYDRATE SCH PUFF: 160; 4.5 AEROSOL RESPIRATORY (INHALATION) at 20:39

## 2018-03-13 RX ADMIN — METHYLPREDNISOLONE SODIUM SUCCINATE SCH MG: 40 INJECTION, POWDER, FOR SOLUTION INTRAMUSCULAR; INTRAVENOUS at 13:10

## 2018-03-13 RX ADMIN — GABAPENTIN SCH MG: 400 CAPSULE ORAL at 17:23

## 2018-03-13 RX ADMIN — OXYCODONE HYDROCHLORIDE AND ACETAMINOPHEN PRN TAB: 10; 325 TABLET ORAL at 08:31

## 2018-03-13 RX ADMIN — HEPARIN SODIUM SCH UNITS: 10000 INJECTION, SOLUTION INTRAVENOUS; SUBCUTANEOUS at 08:32

## 2018-03-13 RX ADMIN — IPRATROPIUM BROMIDE AND ALBUTEROL SULFATE SCH AMPULE: .5; 3 SOLUTION RESPIRATORY (INHALATION) at 21:21

## 2018-03-13 RX ADMIN — ATENOLOL SCH MG: 50 TABLET ORAL at 10:02

## 2018-03-13 RX ADMIN — Medication SCH MG: at 20:41

## 2018-03-13 RX ADMIN — IPRATROPIUM BROMIDE AND ALBUTEROL SULFATE SCH AMPULE: .5; 3 SOLUTION RESPIRATORY (INHALATION) at 08:37

## 2018-03-13 NOTE — EKG
Date Performed: 03/12/2018       Time Performed: 19:21:17

 

PTAGE:      42 years

 

EKG:      Sinus rhythm 

 

 NORMAL ECG

 

PREVIOUS TRACING       : 12/20/2016 16.56 Since the previous tracing, no significant change noted

 

DOCTOR:   Teagan Mcdonnell  Interpretating Date/Time  03/13/2018 20:03:23

## 2018-03-13 NOTE — HHI.PR
Subjective


Remarks


Follow-up pneumonia, COPD exacerbation.  Patient reporting shortness of breath 

and rib cage pain.  Requesting an increase in pain medication.





Objective


Vitals





Vital Signs








  Date Time  Temp Pulse Resp B/P (MAP) Pulse Ox O2 Delivery O2 Flow Rate FiO2


 


3/13/18 08:42     94 Nasal Cannula 2.00 


 


3/13/18 08:00 96.5 67 20 158/105 (122) 92   





    169/102 (124)    


 


3/13/18 04:00 96.2 70 24 134/71 (92) 95   


 


3/13/18 01:25 96.4 71 24 128/81 (97) 95   


 


3/12/18 22:49  86 20 121/62 (81) 95 Nasal Cannula 2.00 


 


3/12/18 22:30     97  3.00 


 


3/12/18 21:15     85 Room Air  


 


3/12/18 19:36  74 20  98 Nasal Cannula 2.00 


 


3/12/18 19:34     99 Nasal Cannula 2.00 


 


3/12/18 18:26 98.6 80 16 110/64 (79) 97   














I/O      


 


 3/12/18 3/12/18 3/12/18 3/13/18 3/13/18 3/13/18





 07:00 15:00 23:00 07:00 15:00 23:00


 


Intake Total    730 ml  


 


Balance    730 ml  


 


      


 


Intake Oral    480 ml  


 


IV Total    250 ml  


 


# Voids    3  


 


# Bowel Movements    0  








Result Diagram:  


3/12/18 2024                                                                   

             3/12/18 1940





Imaging





Last Impressions








Chest X-Ray 3/12/18 1901 Signed





Impressions: 





 Service Date/Time:  Monday, March 12, 2018 19:08 - CONCLUSION:  Bilateral 

patchy 





 infiltrate.     Dash Andres MD 








Objective Remarks


Examined in the presence of the nurse.





General: No acute distress.


Heart: Regular rate and rhythm. No murmur.


Lungs: Crackles bilaterally, left greater than right. Breathing is nonlabored.


Abdomen: Soft, nontender, nondistended.


Extremities: No lower extremity edema.


Psych: Alert and oriented.


Procedures


None


Urinary Catheter:  No


Vascular Central Line Catheter:  No





A/P


Problem List:  


(1) PNA (pneumonia)


ICD Code:  J18.9 - Pneumonia, unspecified organism


Status:  Acute


(2) HTN (hypertension)


ICD Code:  I10 - Essential (primary) hypertension


Status:  Chronic


(3) Acute respiratory failure


ICD Code:  J96.00 - Acute respiratory failure, unspecified whether with hypoxia 

or hypercapnia


Status:  Acute


Assessment and Plan


1.  Community-acquired pneumonia: Chest x-ray shows bilateral infiltrates.  

Continue IV Levaquin.  Sputum culture ordered.


2.  COPD exacerbation with acute respiratory failure: Patient had a documented 

oxygen saturation of 85% on room air.  Continue supplemental oxygen.  DuoNeb 

scheduled and as needed.  Continue Symbicort.  Continue steroids.


3.  Chronic pain: Continue Percocet, which is what the patient takes at home.  

She reportedly has a history of pain medication seeking behavior.


4.  Hypertension: Continue clonidine, atenolol.


5.  GI prophylaxis: Protonix.


6.  Anxiety: Continue clonazepam, fluoxetine.


7.  Leukocytosis: Likely secondary to infection.  Repeat labs are pending today.


8.  DVT prophylaxis: Heparin.





Problem Qualifiers





(1) PNA (pneumonia):  


Qualified Codes:  J18.9 - Pneumonia, unspecified organism


(2) Acute respiratory failure:  


Qualified Codes:  J96.01 - Acute respiratory failure with hypoxia








Wild Montgomery MD Mar 13, 2018 09:49

## 2018-03-13 NOTE — HHI.HP
__________________________________________________





HPI


Service


[facility]


Primary Care Physician


No Primary Care Physician


Admission Diagnosis





COPD EXACERBATION WITH HYPOXEMIA, PNEUMONIA


Diagnoses:  


Chief Complaint:  


COPD exacerbation, cough


Travel History


International Travel<30 Days:  No


Contact w/Intl Traveler <30 Da:  No


Traveled to Known Affected Are:  No


History of Present Illness


42-year-old female with a history of hypertension, COPD, anxiety and depression 

presented to the ED with complaints of shortness of breath. 





Patient states for the last 3-4 days she has been experiencing increased 

shortness of breath, cough and green sputum production.  She states she has 

been taking her inhalers and nebulizer treatments at home without any relief.  

She complains of pain to her bilateral ribs, 10 out of 10, aching, worse with 

taking a deep breath and coughing.  She states she normally takes Percocet at 

home but the pain is so severe the Percocet are not helping.  She denies any 

chest pain.  She does state she had fevers and chills at home but is unsure how 

high her fever was.





Patient was seen on  and diagnosed with pneumonia she was discharged 

home for having drug-seeking behavior and was discharged on oral antibiotics.





Review of Systems


Except as stated in HPI:  all other systems reviewed are Neg





Past Family Social History


Past Medical History


COPD


Hypertension


Anxiety


Depression


Chronic pain


Past Surgical History





Hysterectomy


Cervical Fusion


Reported Medications





Reported Meds & Active Scripts


Active


Klonopin (Clonazepam) 1 Mg Tab 1 Mg PO TID


Symbicort Inh (Budesonide/Formoterol Fumarate) 160-4.5 Mcg/Act Aero 2 Puff INH 

Q12HR


Reported


Ambien (Zolpidem Tartrate) 5 Mg Tab 5 Mg PO HS PRN


Prilosec (Omeprazole Magnesium) 20 Mg Tab 40 Mg PO BID


Percocet (Oxycodone-Acetaminophen)  mg Tab 1 Tab PO Q6H PRN


Fioricet (Butalbital-Acetaminophen-Caffeine) Unknown Strength Cap Unknown Dose 

PO Q4H PRN


Klonopin (Clonazepam) 1 Mg Tab 1 Mg PO TID


Gabapentin 800 Mg Tab 800 Mg PO Q6HR


Fluoxetine (Fluoxetine HCl) 40 Mg Cap 40 Cap PO DAILY


Atenolol 50 Mg Tab 50 Mg PO BID


Clonidine (Clonidine HCl) 0.2 Mg Tab 0.2 Mg PO TID


Allergies:  


Coded Allergies:  


     azithromycin (Verified  Allergy, Severe, 3/12/18)


     ondansetron (Unverified  Allergy, Intermediate, VOMITING, 3/12/18)


     Sulfa (Sulfonamide Antibiotics) (Unverified  Adverse Reaction, Intermediate

, 3/12/18)


     cyclobenzaprine (Unverified  Adverse Reaction, Intermediate, 3/12/18)


     erythromycin base (Unverified  Adverse Reaction, Intermediate, 3/12/18)


     haloperidol (Unverified  Adverse Reaction, Intermediate, 3/12/18)


     propoxyphene (Unverified  Adverse Reaction, Intermediate, 3/12/18)


     sumatriptan (Unverified  Adverse Reaction, Intermediate, 3/12/18)


Active Ordered Medications





Current Medications








 Medications


  (Trade)  Dose


 Ordered  Sig/Joyce


 Route  Start Time


 Stop Time Status Last Admin


 


  (NS Flush)  2 ml  UNSCH  PRN


 IV FLUSH  3/12/18 23:15


     


 


 


  (NS Flush)  2 ml  BID


 IV FLUSH  3/13/18 09:00


     


 


 


  (Narcan Inj)  0.4 mg  UNSCH  PRN


 IV PUSH  3/12/18 23:15


     


 


 


 Levofloxacin/


 Dextrose  150 ml @ 


 100 mls/hr  Q24H


 IV  3/13/18 23:00


     


 


 


  (Duoneb Neb)  1 ampule  Q6HR  NEB


 NEB  3/13/18 04:00


    3/13/18 03:29


 


 


  (Duoneb Neb)  1 ampule  Q2HR NEB  PRN


 NEB  3/12/18 23:30


     


 








Family History


She denies any family history


Social History


Tobacco use: Patient states she quit 1 week ago, prior to this 1 PPD


Alcohol use: Denies





Physical Exam


Vital Signs





Vital Signs








  Date Time  Temp Pulse Resp B/P (MAP) Pulse Ox O2 Delivery O2 Flow Rate FiO2


 


3/13/18 04:00 96.2 70 24 134/71 (92) 95   


 


3/13/18 01:25 96.4 71 24 128/81 (97) 95   


 


3/12/18 22:49  86 20 121/62 (81) 95 Nasal Cannula 2.00 


 


3/12/18 22:30     97  3.00 


 


3/12/18 21:15     85 Room Air  


 


3/12/18 19:36  74 20  98 Nasal Cannula 2.00 


 


3/12/18 19:34     99 Nasal Cannula 2.00 


 


3/12/18 18:26 98.6 80 16 110/64 79 97   








Physical Exam


GENERAL: This is a well-nourished, well-developed patient, in no apparent 

distress.


SKIN: No rashes, ecchymoses or lesions. Cool and dry.


HEAD: Atraumatic. Normocephalic. 


EYES: Pupils equal round and reactive. 


ENT: Nose without bleeding, purulent drainage or septal hematoma.  Airway 

patent.


NECK: Trachea midline. No JVD or lymphadenopathy. Supple, nontender, no 

meningeal signs.


CARDIOVASCULAR: Regular rate and rhythm without murmurs, gallops, or rubs. 


RESPIRATORY: Diminished breath sounds bilaterally, throughout  


GASTROINTESTINAL: Abdomen soft, non-tender, nondistended. No hepato-splenomegaly

, or palpable masses. No guarding.


MUSCULOSKELETAL: Extremities without clubbing, cyanosis, or edema. No joint 

tenderness, effusion, or edema noted. 


NEUROLOGICAL: Awake and alert. Motor and sensory grossly within normal limits.  

Normal speech.


Laboratory





Laboratory Tests








Test


  3/12/18


19:40 3/12/18


20:24 3/12/18


22:00


 


Blood Urea Nitrogen 9   


 


Creatinine 0.79   


 


Random Glucose 101   


 


Calcium Level 9.4   


 


Sodium Level 138   


 


Potassium Level 4.0   


 


Chloride Level 106   


 


Carbon Dioxide Level 20.9   


 


Anion Gap 11   


 


Estimat Glomerular Filtration


Rate 80 


  


  


 


 


White Blood Count  17.7  


 


Red Blood Count  3.85  


 


Hemoglobin  10.7  


 


Hematocrit  33.1  


 


Mean Corpuscular Volume  86.1  


 


Mean Corpuscular Hemoglobin  27.9  


 


Mean Corpuscular Hemoglobin


Concent 


  32.4 


  


 


 


Red Cell Distribution Width  21.3  


 


Platelet Count  430  


 


Mean Platelet Volume  7.5  


 


Neutrophils (%) (Auto)  90.3  


 


Lymphocytes (%) (Auto)  4.3  


 


Monocytes (%) (Auto)  3.3  


 


Eosinophils (%) (Auto)  1.4  


 


Basophils (%) (Auto)  0.7  


 


Neutrophils # (Auto)  16.0  


 


Lymphocytes # (Auto)  0.8  


 


Monocytes # (Auto)  0.6  


 


Eosinophils # (Auto)  0.3  


 


Basophils # (Auto)  0.1  


 


CBC Comment  DIFF FINAL  


 


Differential Comment    


 


Blood Gas Puncture Site   RT RADIAL 


 


Blood Gas Patient Temperature   98.6 


 


Blood Gas HCO3   20 


 


Blood Gas Base Excess   -3.6 


 


Blood Gas Oxygen Saturation   90 


 


Arterial Blood pH   7.45 


 


Arterial Blood Partial


Pressure CO2 


  


  29 


 


 


Arterial Blood Partial


Pressure O2 


  


  67 


 


 


Arterial Blood Oxygen Content   13.3 


 


Arterial Blood


Carboxyhemoglobin 


  


  1.1 


 


 


Arterial Blood Methemoglobin   1.5 


 


Blood Gas Hemoglobin   10.5 


 


Oxygen Delivery Device   ROOM AIR 


 


Blood Gas Inspired Oxygen   21 














 Date/Time


Source Procedure


Growth Status


 


 


 3/12/18 22:02


Nasal Washing Influenza Types A,B Antigen (ANA) - Final


NEGATIVE FOR FLU A AND B ANTIGEN.... Complete








Result Diagram:  


3/12/18 2024                                                                   

             3/12/18 194





Imaging





Last Impressions








Chest X-Ray 3/12/18 190 Signed





Impressions: 





 Service Date/Time:  Monday, 2018 19:08 - CONCLUSION:  Bilateral 

patchy 





 infiltrate.     John E. Agles, MD Caprini VTE Risk Assessment


Caproney VTE Risk Assessment:  Mod/High Risk (score >= 2)


Caprini Risk Assessment Model











 Point Value = 1          Point Value = 2  Point Value = 3  Point Value = 5


 


Age 41-60


Minor surgery


BMI > 25 kg/m2


Swollen legs


Varicose veins


Pregnancy or postpartum


History of unexplained or recurrent


   spontaneous 


Oral contraceptives or hormone


   replacement


Sepsis (< 1 month)


Serious lung disease, including


   pneumonia (< 1 month)


Abnormal pulmonary function


Acute myocardial infarction


Congestive heart failure (< 1 month)


History of inflammatory bowel disease


Medical patient at bed rest Age 61-74


Arthroscopic surgery


Major open surgery (> 45 min)


Laparoscopic surgery (> 45 min)


Malignancy


Confined to bed (> 72 hours)


Immobilizing plaster cast


Central venous access Age >= 75


History of VTE


Family history of VTE


Factor V Leiden


Prothrombin 07265L


Lupus anticoagulant


Anticardiolipin antibodies


Elevated serum homocysteine


Heparin-induced thrombocytopenia


Other congenital or acquired


   thrombophilia Stroke (< 1 month)


Elective arthroplasty


Hip, pelvis, or leg fracture


Acute spinal cord injury (< 1 month)








Prophylaxis Regimen











   Total Risk


Factor Score Risk Level Prophylaxis Regimen


 


0-1      Low Early ambulation


 


2 Moderate Order ONE of the following:


*Sequential Compression Device (SCD)


*Heparin 5000 units SQ BID


 


3-4 Higher Order ONE of the following medications:


*Heparin 5000 units SQ TID


*Enoxaparin/Lovenox 40 mg SQ daily (WT < 150 kg, CrCl > 30 mL/min)


*Enoxaparin/Lovenox 30 mg SQ daily (WT < 150 kg, CrCl > 10-29 mL/min)


*Enoxaparin/Lovenox 30 mg SQ BID (WT < 150 kg, CrCl > 30 mL/min)


AND/OR


*Sequential Compression Device (SCD)


 


5 or more Highest Order ONE of the following medications:


*Heparin 5000 units SQ TID (Preferred with Epidurals)


*Enoxaparin/Lovenox 40 mg SQ daily (WT < 150 kg, CrCl > 30 mL/min)


*Enoxaparin/Lovenox 30 mg SQ daily (WT < 150 kg, CrCl > 10-29 mL/min)


*Enoxaparin/Lovenox 30 mg SQ BID (WT < 150 kg, CrCl > 30 mL/min)


AND


*Sequential Compression Device (SCD)











Assessment and Plan


Problem List:  


(1) PNA (pneumonia)


ICD Code:  J18.9 - Pneumonia, unspecified organism


Status:  Acute


(2) HTN (hypertension)


ICD Code:  I10 - Essential (primary) hypertension


Status:  Chronic


(3) Acute respiratory failure


ICD Code:  J96.00 - Acute respiratory failure, unspecified whether with hypoxia 

or hypercapnia


Status:  Acute


Assessment and Plan


42-year-old female with a history of hypertension, COPD, anxiety and depression 

presented to the ED with complaints of shortness of breath.





Bilateral community-acquired pneumonia 


Chest x-ray reviewed and shows bilateral infiltrates


   -Levaquin IV


   -Sputum culture ordered


   -Morphine IM given 1 for breakthrough, patient currently has no IV at this 

time.


   





COPD exacerbation with acute respiratory failure with hypoxia, patient found to 

be 85% on room air


   -DuoNeb scheduled and when necessary


   -Oxygen as needed


   -Mucinex twice a day





Chronic pain


            -Continue home medications Percocet


   -Monitor for pain seeking behavior





Hypertension, chronic


   -Resumed home medications clonidine and metoprolol, monitor vitals





DVT prophylaxis: Heparin


Discussed Condition With


Patient and RN





Physician Certification


2 Midnight Certification Type:  Admission for Inpatient Services


Order for Inpatient Services


The services are ordered in accordance with Medicare regulations or non-

Medicare payer requirements, as applicable.  In the case of services not 

specified as inpatient-only, they are appropriately provided as inpatient 

services in accordance with the 2-midnight benchmark.


Estimated LOS (days):  2


 days is the estimated time the patient will need to remain in the hospital, 

assuming treatment plan goals are met and no additional complications.


Post-Hospital Plan:  Home





Problem Qualifiers





(1) PNA (pneumonia):  


Qualified Codes:  J18.9 - Pneumonia, unspecified organism


(2) Acute respiratory failure:  


Qualified Codes:  J96.01 - Acute respiratory failure with hypoxia








Evelyn Kellogg Mar 13, 2018 04:58

## 2018-03-14 VITALS
HEART RATE: 62 BPM | RESPIRATION RATE: 18 BRPM | OXYGEN SATURATION: 91 % | TEMPERATURE: 97.7 F | SYSTOLIC BLOOD PRESSURE: 110 MMHG | DIASTOLIC BLOOD PRESSURE: 78 MMHG

## 2018-03-14 VITALS
SYSTOLIC BLOOD PRESSURE: 152 MMHG | TEMPERATURE: 97.4 F | RESPIRATION RATE: 20 BRPM | HEART RATE: 66 BPM | DIASTOLIC BLOOD PRESSURE: 98 MMHG | OXYGEN SATURATION: 96 %

## 2018-03-14 VITALS
RESPIRATION RATE: 17 BRPM | TEMPERATURE: 97.5 F | DIASTOLIC BLOOD PRESSURE: 76 MMHG | HEART RATE: 80 BPM | SYSTOLIC BLOOD PRESSURE: 157 MMHG | OXYGEN SATURATION: 93 %

## 2018-03-14 VITALS
TEMPERATURE: 97.6 F | DIASTOLIC BLOOD PRESSURE: 62 MMHG | OXYGEN SATURATION: 94 % | SYSTOLIC BLOOD PRESSURE: 107 MMHG | HEART RATE: 71 BPM | RESPIRATION RATE: 18 BRPM

## 2018-03-14 VITALS — HEART RATE: 77 BPM

## 2018-03-14 VITALS
DIASTOLIC BLOOD PRESSURE: 85 MMHG | HEART RATE: 71 BPM | TEMPERATURE: 98.2 F | OXYGEN SATURATION: 90 % | SYSTOLIC BLOOD PRESSURE: 150 MMHG | RESPIRATION RATE: 18 BRPM

## 2018-03-14 VITALS
SYSTOLIC BLOOD PRESSURE: 134 MMHG | OXYGEN SATURATION: 94 % | DIASTOLIC BLOOD PRESSURE: 96 MMHG | TEMPERATURE: 97.4 F | HEART RATE: 64 BPM | RESPIRATION RATE: 18 BRPM

## 2018-03-14 VITALS — HEART RATE: 62 BPM

## 2018-03-14 VITALS — OXYGEN SATURATION: 97 %

## 2018-03-14 VITALS — HEART RATE: 58 BPM

## 2018-03-14 LAB
ERYTHROCYTE [DISTWIDTH] IN BLOOD BY AUTOMATED COUNT: 21.4 % (ref 11.6–17.2)
HCT VFR BLD CALC: 30.3 % (ref 35–46)
HGB BLD-MCNC: 9.8 GM/DL (ref 11.6–15.3)
MCH RBC QN AUTO: 27.7 PG (ref 27–34)
MCHC RBC AUTO-ENTMCNC: 32.4 % (ref 32–36)
MCV RBC AUTO: 85.5 FL (ref 80–100)
PLATELET # BLD: 531 TH/MM3 (ref 150–450)
PMV BLD AUTO: 7.5 FL (ref 7–11)
RBC # BLD AUTO: 3.54 MIL/MM3 (ref 4–5.3)
WBC # BLD AUTO: 15.6 TH/MM3 (ref 4–11)

## 2018-03-14 RX ADMIN — Medication SCH ML: at 09:47

## 2018-03-14 RX ADMIN — IPRATROPIUM BROMIDE AND ALBUTEROL SULFATE SCH AMPULE: .5; 3 SOLUTION RESPIRATORY (INHALATION) at 09:23

## 2018-03-14 RX ADMIN — OXYCODONE HYDROCHLORIDE AND ACETAMINOPHEN PRN TAB: 10; 325 TABLET ORAL at 09:45

## 2018-03-14 RX ADMIN — Medication SCH MG: at 09:00

## 2018-03-14 RX ADMIN — IPRATROPIUM BROMIDE AND ALBUTEROL SULFATE SCH AMPULE: .5; 3 SOLUTION RESPIRATORY (INHALATION) at 16:55

## 2018-03-14 RX ADMIN — BUDESONIDE AND FORMOTEROL FUMARATE DIHYDRATE SCH PUFF: 160; 4.5 AEROSOL RESPIRATORY (INHALATION) at 09:47

## 2018-03-14 RX ADMIN — ZOLPIDEM TARTRATE PRN MG: 5 TABLET, COATED ORAL at 21:18

## 2018-03-14 RX ADMIN — IPRATROPIUM BROMIDE AND ALBUTEROL SULFATE SCH AMPULE: .5; 3 SOLUTION RESPIRATORY (INHALATION) at 20:36

## 2018-03-14 RX ADMIN — METHYLPREDNISOLONE SODIUM SUCCINATE SCH MG: 40 INJECTION, POWDER, FOR SOLUTION INTRAMUSCULAR; INTRAVENOUS at 00:15

## 2018-03-14 RX ADMIN — Medication SCH ML: at 21:14

## 2018-03-14 RX ADMIN — GABAPENTIN SCH MG: 400 CAPSULE ORAL at 13:18

## 2018-03-14 RX ADMIN — BUTALBITAL, ACETAMINOPHEN, AND CAFFEINE PRN TAB: 50; 325; 40 TABLET ORAL at 21:10

## 2018-03-14 RX ADMIN — PANTOPRAZOLE SCH MG: 40 TABLET, DELAYED RELEASE ORAL at 09:46

## 2018-03-14 RX ADMIN — METHYLPREDNISOLONE SODIUM SUCCINATE SCH MG: 40 INJECTION, POWDER, FOR SOLUTION INTRAMUSCULAR; INTRAVENOUS at 06:04

## 2018-03-14 RX ADMIN — BUTALBITAL, ACETAMINOPHEN, AND CAFFEINE PRN TAB: 50; 325; 40 TABLET ORAL at 13:17

## 2018-03-14 RX ADMIN — ATENOLOL SCH MG: 50 TABLET ORAL at 21:10

## 2018-03-14 RX ADMIN — IPRATROPIUM BROMIDE AND ALBUTEROL SULFATE SCH AMPULE: .5; 3 SOLUTION RESPIRATORY (INHALATION) at 03:16

## 2018-03-14 RX ADMIN — OXYCODONE HYDROCHLORIDE AND ACETAMINOPHEN PRN TAB: 10; 325 TABLET ORAL at 23:18

## 2018-03-14 RX ADMIN — GABAPENTIN SCH MG: 400 CAPSULE ORAL at 06:03

## 2018-03-14 RX ADMIN — OXYCODONE HYDROCHLORIDE AND ACETAMINOPHEN PRN TAB: 10; 325 TABLET ORAL at 16:50

## 2018-03-14 RX ADMIN — Medication SCH MG: at 21:00

## 2018-03-14 RX ADMIN — PANTOPRAZOLE SCH MG: 40 TABLET, DELAYED RELEASE ORAL at 21:10

## 2018-03-14 RX ADMIN — LIDOCAINE SCH PATCH: 50 PATCH CUTANEOUS at 13:24

## 2018-03-14 RX ADMIN — BUDESONIDE AND FORMOTEROL FUMARATE DIHYDRATE SCH PUFF: 160; 4.5 AEROSOL RESPIRATORY (INHALATION) at 21:18

## 2018-03-14 RX ADMIN — METHYLPREDNISOLONE SODIUM SUCCINATE SCH MG: 40 INJECTION, POWDER, FOR SOLUTION INTRAMUSCULAR; INTRAVENOUS at 21:10

## 2018-03-14 RX ADMIN — GABAPENTIN SCH MG: 400 CAPSULE ORAL at 23:19

## 2018-03-14 RX ADMIN — Medication SCH MG: at 21:10

## 2018-03-14 RX ADMIN — GABAPENTIN SCH MG: 400 CAPSULE ORAL at 18:56

## 2018-03-14 RX ADMIN — PROMETHAZINE HYDROCHLORIDE PRN MG: 25 TABLET ORAL at 13:18

## 2018-03-14 RX ADMIN — GUAIFENESIN SCH MG: 600 TABLET, EXTENDED RELEASE ORAL at 09:49

## 2018-03-14 RX ADMIN — ATENOLOL SCH MG: 50 TABLET ORAL at 09:46

## 2018-03-14 RX ADMIN — GABAPENTIN SCH MG: 400 CAPSULE ORAL at 00:15

## 2018-03-14 RX ADMIN — OXYCODONE HYDROCHLORIDE AND ACETAMINOPHEN PRN TAB: 10; 325 TABLET ORAL at 03:38

## 2018-03-14 RX ADMIN — LEVOFLOXACIN SCH MLS/HR: 5 INJECTION, SOLUTION INTRAVENOUS at 23:20

## 2018-03-14 RX ADMIN — PROMETHAZINE HYDROCHLORIDE PRN MG: 25 TABLET ORAL at 21:17

## 2018-03-14 RX ADMIN — HEPARIN SODIUM SCH UNITS: 10000 INJECTION, SOLUTION INTRAVENOUS; SUBCUTANEOUS at 09:00

## 2018-03-14 RX ADMIN — GUAIFENESIN SCH MG: 600 TABLET, EXTENDED RELEASE ORAL at 21:10

## 2018-03-14 NOTE — HHI.PR
Subjective


Remarks


f/u for PNA and COPD exacerbation


patient c/o of nose bleed.  When I saw patient she did not have any active nose 

bleed.  She also coughing up bleed from the nose bleed.  During my interview 

with patient she continued to blow her nose after I told her not to because it 

can irritate her nose. 


Patient also for more pain medication for her chronic pain.  She stated she had 

a cervical fusion and cannot follow up in [place].


She is also asking for Fioricet for her HA and Ambien to sleep.  This was 

confirmed with her pharmacist.


Denied any SOB.


discussed with patient's nurse Navya.  She stated last night night nurse 

stated patient was messing with her nose.





Objective


Vitals





Vital Signs








  Date Time  Temp Pulse Resp B/P (MAP) Pulse Ox O2 Delivery O2 Flow Rate FiO2


 


3/14/18 08:09 97.5 80 17 157/76 (103) 93   


 


3/14/18 08:00      Nasal Cannula 2.00 


 


3/14/18 04:01  62      


 


3/14/18 04:00 97.4 66 20 152/98 (116) 96   


 


3/14/18 00:04  77      


 


3/14/18 00:00      Nasal Cannula 2.00 


 


3/14/18 00:00 97.4 64 18 134/96 (109) 94   


 


3/13/18 21:21     96 Nasal Cannula 2.00 


 


3/13/18 20:05  80      


 


3/13/18 20:00 97.6 69 16 142/85 (104) 94   


 


3/13/18 19:00      Nasal Cannula 2.00 


 


3/13/18 16:15 97.7 59 20 186/120 (142) 95   














I/O      


 


 3/13/18 3/13/18 3/13/18 3/14/18 3/14/18 3/14/18





 07:00 15:00 23:00 07:00 15:00 23:00


 


Intake Total 730 ml   240 ml  


 


Balance 730 ml   240 ml  


 


      


 


Intake Oral 480 ml   240 ml  


 


IV Total 250 ml     


 


# Voids 3   3  


 


# Bowel Movements 0   0  








Result Diagram:  


3/13/18 1104                                                                   

             3/13/18 1104





Objective Remarks


GENERAL: in NAD


HEENT: dry and wet blood in nasal canal.  No active bleeding noted. 


CARDIOVASCULAR: Regular rate and rhythm without murmurs, gallops, or rubs. 


RESPIRATORY: Breath sounds equal bilaterally. No accessory muscle use.


GASTROINTESTINAL: Abdomen soft, non-tender, nondistended. 


MUSCULOSKELETAL: No cyanosis, or edema. 


BACK: Nontender without obvious deformity. No CVA tenderness.





Procedures


None


Medications and IVs





Current Medications


Albuterol/ Ipratropium (Duoneb Neb) 1 ampule Q15M INH  Last administered on 3/12

/18at 22:38;  Start 3/12/18 at 21:30;  Stop 3/12/18 at 22:01;  Status DC


Methylprednisolone Sodium Succinate (SoluMEDROL INJ) 125 mg ONCE  ONCE IV PUSH  

Last administered on 3/12/18at 22:45;  Start 3/12/18 at 22:00;  Stop 3/12/18 at 

22:01;  Status DC


Albuterol Sulfate (Albuterol Neb) 2.5 mg Q15M INH ;  Start 3/12/18 at 22:00;  

Stop 3/12/18 at 22:16;  Status DC


Magnesium Sulfate/ Dextrose 100 ml @  100 mls/hr ONCE  ONCE IV  Last 

administered on 3/12/18at 22:45;  Start 3/12/18 at 22:00;  Stop 3/12/18 at 22:59

;  Status DC


Clonazepam (KlonoPIN) 1 mg ONCE  ONCE PO  Last administered on 3/12/18at 22:45;

  Start 3/12/18 at 22:15;  Stop 3/12/18 at 22:16;  Status DC


Oxycodone/ Acetaminophen (Percocet  5-325 Mg) 1 tab ONCE  ONCE PO  Last 

administered on 3/12/18at 22:46;  Start 3/12/18 at 22:15;  Stop 3/12/18 at 22:16

;  Status DC


Levofloxacin/ Dextrose 150 ml @  100 mls/hr ONCE  ONCE IV  Last administered on 

3/12/18at 22:46;  Start 3/12/18 at 22:30;  Stop 3/12/18 at 23:59;  Status DC


Sodium Chloride (NS Flush) 2 ml UNSCH  PRN IV FLUSH FLUSH AFTER USING IV ACCESS

;  Start 3/12/18 at 23:15


Sodium Chloride (NS Flush) 2 ml BID IV FLUSH  Last administered on 3/14/18at 09:

47;  Start 3/13/18 at 09:00


Naloxone HCl (Narcan Inj) 0.4 mg UNSCH  PRN IV PUSH SEE LABEL COMMENTS;  Start 3

/12/18 at 23:15


Levofloxacin/ Dextrose 150 ml @  100 mls/hr Q24H IV  Last administered on 3/13/

18at 22:31;  Start 3/13/18 at 23:00


Albuterol/ Ipratropium (Duoneb Neb) 1 ampule Q6HR  NEB NEB  Last administered 

on 3/14/18at 03:16;  Start 3/13/18 at 04:00


Albuterol/ Ipratropium (Duoneb Neb) 1 ampule Q2HR NEB  PRN NEB wheezing;  Start 

3/12/18 at 23:30


Ondansetron HCl (Zofran  Odt) 4 mg ONCE  ONCE PO ;  Start 3/13/18 at 04:30;  

Stop 3/13/18 at 05:03;  Status DC


Acetaminophen/ Hydrocodone Bitart (Norco  5-325 Mg) 1 tab ONCE  ONCE PO ;  

Start 3/13/18 at 04:30;  Stop 3/13/18 at 05:03;  Status DC


Morphine Sulfate (Morphine Inj) 2 mg ONCE  ONCE IV  Last administered on 3/13/

18at 05:18;  Start 3/13/18 at 05:15;  Stop 3/13/18 at 05:16;  Status DC


Guaifenesin (Mucinex Er) 600 mg BID PO  Last administered on 3/14/18at 09:49;  

Start 3/13/18 at 09:00


Atenolol (Tenormin) 50 mg BID PO  Last administered on 3/14/18at 09:46;  Start 3

/13/18 at 09:00


Budesonide/ Formoterol Fumarate (Symbicort 160-4.5 Mcg Inh) 2 puff Q12HR INH  

Last administered on 3/14/18at 09:47;  Start 3/13/18 at 09:00


Clonazepam (KlonoPIN) 1 mg TID PO  Last administered on 3/14/18at 09:46;  Start 

3/13/18 at 09:00


Clonidine (Catapres) 0.2 mg TID PO  Last administered on 3/14/18at 09:46;  

Start 3/13/18 at 09:00


Fluoxetine HCl (PROzac) 40 mg DAILY PO  Last administered on 3/14/18at 09:46;  

Start 3/13/18 at 09:00


Gabapentin (Neurontin) 800 mg Q6HR PO  Last administered on 3/14/18at 06:03;  

Start 3/13/18 at 06:00


Oxycodone/ Acetaminophen (Percocet  Mg) 1 tab Q6H  PRN PO PAIN Last 

administered on 3/14/18at 09:45;  Start 3/13/18 at 05:45


Pantoprazole Sodium (Protonix) 40 mg BID PO  Last administered on 3/14/18at 09:

46;  Start 3/13/18 at 09:00


Heparin Sodium (Porcine) (Heparin Inj) 5,000 units Q12HR SQ  Last administered 

on 3/13/18at 20:40;  Start 3/13/18 at 09:00


Naproxen Sodium (Anaprox Ds) 550 mg BID PO  Last administered on 3/13/18at 13:11

;  Start 3/13/18 at 11:00


Methylprednisolone Sodium Succinate (SoluMEDROL INJ) 40 mg Q6HR IV PUSH  Last 

administered on 3/14/18at 06:04;  Start 3/13/18 at 12:00;  Stop 3/14/18 at 11:26

;  Status DC


Zolpidem Tartrate (Ambien) 5 mg ONCE  ONCE PO  Last administered on 3/13/18at 22

:30;  Start 3/13/18 at 21:45;  Stop 3/13/18 at 21:47;  Status DC


Acetaminophen/ Butalbital/ Caffeine (Fioricet 325-50-40) 1 tab Q8H  PRN PO 

headaches;  Start 3/14/18 at 11:30;  Status UNV


Zolpidem Tartrate (Ambien) 5 mg HS  PRN PO insomnia;  Start 3/14/18 at 11:30;  

Status UNV


Methylprednisolone Sodium Succinate (SoluMEDROL INJ) 40 mg Q12HR IV PUSH ;  

Start 3/14/18 at 21:00;  Status UNV


Lidocaine HCl (Lidoderm 5% Patch.12 Hr) 1 patch DAILY T-DERMAL ;  Start 3/14/18 

at 11:30;  Status UNV





A/P


Problem List:  


(1) PNA (pneumonia)


ICD Code:  J18.9 - Pneumonia, unspecified organism


Status:  Acute


(2) HTN (hypertension)


ICD Code:  I10 - Essential (primary) hypertension


Status:  Chronic


(3) Acute respiratory failure


ICD Code:  J96.00 - Acute respiratory failure, unspecified whether with hypoxia 

or hypercapnia


Status:  Acute


Assessment and Plan


This is a 43 y/o SOB





Community-acquired pneumonia:


- Chest x-ray shows bilateral infiltrates.  Continue IV Levaquin.  patient has 

not have any sputum to give sample.





Epistaxis


-no active bleeding now.  most likely due to dryness from oxygen and patient 

causing irritation.  she requesting to see specialist.  will get repeat 

Hemoglobin and hold heparin for now since she had about 1/2 cup of blood loss. 





COPD exacerbation with acute respiratory failure


- Patient had a documented oxygen saturation of 85% on room air. improving. 

Continue supplemental oxygen.  DuoNeb scheduled and as needed.  Continue 

Symbicort. wean solumedrol. 





 Chronic pain: 


-Continue Percocet, which is what the patient takes at home.  She reportedly 

has a history of pain medication seeking behavior.  will give Lidoderm patch.





HA


-patient did not complained of HA but when I told her I cannot increase her 

pain regimen for her chronic pain.  She then asked for Fioricet and Ambien.  I 

told her her use and she then complained of HA.  ? drug seeking behavior. 





Hypertension


-Continue clonidine, atenolol.





Anxiety


- Continue clonazepam, fluoxetine.





Leukocytosis


-improved.





 DVT prophylaxis: Heparin. hold heparin due to nose bleed





Problem Qualifiers





(1) PNA (pneumonia):  


Qualified Codes:  J18.9 - Pneumonia, unspecified organism


(2) Acute respiratory failure:  


Qualified Codes:  J96.01 - Acute respiratory failure with hypoxia








Kami Da Silva MD Mar 14, 2018 11:32

## 2018-03-15 VITALS — HEART RATE: 74 BPM | OXYGEN SATURATION: 98 % | RESPIRATION RATE: 20 BRPM | TEMPERATURE: 98.1 F

## 2018-03-15 VITALS — OXYGEN SATURATION: 91 %

## 2018-03-15 VITALS
OXYGEN SATURATION: 92 % | RESPIRATION RATE: 20 BRPM | TEMPERATURE: 98 F | SYSTOLIC BLOOD PRESSURE: 137 MMHG | DIASTOLIC BLOOD PRESSURE: 83 MMHG | HEART RATE: 66 BPM

## 2018-03-15 VITALS
HEART RATE: 70 BPM | DIASTOLIC BLOOD PRESSURE: 93 MMHG | TEMPERATURE: 97.2 F | SYSTOLIC BLOOD PRESSURE: 148 MMHG | RESPIRATION RATE: 17 BRPM | OXYGEN SATURATION: 93 %

## 2018-03-15 VITALS
DIASTOLIC BLOOD PRESSURE: 89 MMHG | SYSTOLIC BLOOD PRESSURE: 157 MMHG | RESPIRATION RATE: 18 BRPM | OXYGEN SATURATION: 96 % | HEART RATE: 74 BPM | TEMPERATURE: 97.8 F

## 2018-03-15 VITALS — HEART RATE: 76 BPM

## 2018-03-15 VITALS
SYSTOLIC BLOOD PRESSURE: 158 MMHG | TEMPERATURE: 98 F | RESPIRATION RATE: 18 BRPM | OXYGEN SATURATION: 91 % | DIASTOLIC BLOOD PRESSURE: 100 MMHG | HEART RATE: 57 BPM

## 2018-03-15 VITALS
TEMPERATURE: 97.6 F | OXYGEN SATURATION: 97 % | HEART RATE: 64 BPM | SYSTOLIC BLOOD PRESSURE: 149 MMHG | RESPIRATION RATE: 20 BRPM | DIASTOLIC BLOOD PRESSURE: 76 MMHG

## 2018-03-15 VITALS — HEART RATE: 79 BPM

## 2018-03-15 VITALS — OXYGEN SATURATION: 92 %

## 2018-03-15 VITALS — OXYGEN SATURATION: 96 %

## 2018-03-15 LAB
BUN SERPL-MCNC: 21 MG/DL (ref 7–18)
CALCIUM SERPL-MCNC: 9.1 MG/DL (ref 8.5–10.1)
CHLORIDE SERPL-SCNC: 103 MEQ/L (ref 98–107)
CREAT SERPL-MCNC: 0.8 MG/DL (ref 0.5–1)
ERYTHROCYTE [DISTWIDTH] IN BLOOD BY AUTOMATED COUNT: 21.6 % (ref 11.6–17.2)
GFR SERPLBLD BASED ON 1.73 SQ M-ARVRAT: 79 ML/MIN (ref 89–?)
GLUCOSE SERPL-MCNC: 85 MG/DL (ref 74–106)
HCO3 BLD-SCNC: 26.1 MEQ/L (ref 21–32)
HCT VFR BLD CALC: 37.8 % (ref 35–46)
HGB BLD-MCNC: 12 GM/DL (ref 11.6–15.3)
MCH RBC QN AUTO: 27.5 PG (ref 27–34)
MCHC RBC AUTO-ENTMCNC: 31.8 % (ref 32–36)
MCV RBC AUTO: 86.4 FL (ref 80–100)
PLATELET # BLD: 545 TH/MM3 (ref 150–450)
PMV BLD AUTO: 6.8 FL (ref 7–11)
RBC # BLD AUTO: 4.38 MIL/MM3 (ref 4–5.3)
SODIUM SERPL-SCNC: 139 MEQ/L (ref 136–145)
WBC # BLD AUTO: 9 TH/MM3 (ref 4–11)

## 2018-03-15 RX ADMIN — GABAPENTIN SCH MG: 400 CAPSULE ORAL at 05:43

## 2018-03-15 RX ADMIN — GUAIFENESIN SCH MG: 600 TABLET, EXTENDED RELEASE ORAL at 20:12

## 2018-03-15 RX ADMIN — MUPIROCIN CALCIUM SCH APPLIC: 20 OINTMENT TOPICAL at 09:30

## 2018-03-15 RX ADMIN — IPRATROPIUM BROMIDE AND ALBUTEROL SULFATE SCH AMPULE: .5; 3 SOLUTION RESPIRATORY (INHALATION) at 21:34

## 2018-03-15 RX ADMIN — Medication SCH ML: at 09:23

## 2018-03-15 RX ADMIN — IPRATROPIUM BROMIDE AND ALBUTEROL SULFATE SCH AMPULE: .5; 3 SOLUTION RESPIRATORY (INHALATION) at 09:31

## 2018-03-15 RX ADMIN — OXYCODONE HYDROCHLORIDE AND ACETAMINOPHEN PRN TAB: 10; 325 TABLET ORAL at 05:43

## 2018-03-15 RX ADMIN — BUTALBITAL, ACETAMINOPHEN, AND CAFFEINE PRN TAB: 50; 325; 40 TABLET ORAL at 22:27

## 2018-03-15 RX ADMIN — ZOLPIDEM TARTRATE PRN MG: 5 TABLET, COATED ORAL at 22:27

## 2018-03-15 RX ADMIN — IPRATROPIUM BROMIDE AND ALBUTEROL SULFATE SCH AMPULE: .5; 3 SOLUTION RESPIRATORY (INHALATION) at 03:40

## 2018-03-15 RX ADMIN — Medication SCH ML: at 20:12

## 2018-03-15 RX ADMIN — METHYLPREDNISOLONE SODIUM SUCCINATE SCH MG: 40 INJECTION, POWDER, FOR SOLUTION INTRAMUSCULAR; INTRAVENOUS at 09:22

## 2018-03-15 RX ADMIN — BUTALBITAL, ACETAMINOPHEN, AND CAFFEINE PRN TAB: 50; 325; 40 TABLET ORAL at 05:42

## 2018-03-15 RX ADMIN — PANTOPRAZOLE SCH MG: 40 TABLET, DELAYED RELEASE ORAL at 09:22

## 2018-03-15 RX ADMIN — MUPIROCIN CALCIUM SCH APPLIC: 20 OINTMENT TOPICAL at 20:11

## 2018-03-15 RX ADMIN — Medication SCH MG: at 20:13

## 2018-03-15 RX ADMIN — IPRATROPIUM BROMIDE AND ALBUTEROL SULFATE SCH AMPULE: .5; 3 SOLUTION RESPIRATORY (INHALATION) at 15:22

## 2018-03-15 RX ADMIN — ATENOLOL SCH MG: 50 TABLET ORAL at 20:12

## 2018-03-15 RX ADMIN — BUTALBITAL, ACETAMINOPHEN, AND CAFFEINE PRN TAB: 50; 325; 40 TABLET ORAL at 14:49

## 2018-03-15 RX ADMIN — GUAIFENESIN SCH MG: 600 TABLET, EXTENDED RELEASE ORAL at 09:23

## 2018-03-15 RX ADMIN — LIDOCAINE SCH PATCH: 50 PATCH CUTANEOUS at 09:24

## 2018-03-15 RX ADMIN — OXYCODONE HYDROCHLORIDE AND ACETAMINOPHEN PRN TAB: 10; 325 TABLET ORAL at 18:37

## 2018-03-15 RX ADMIN — OXYCODONE HYDROCHLORIDE AND ACETAMINOPHEN PRN TAB: 10; 325 TABLET ORAL at 12:13

## 2018-03-15 RX ADMIN — BUDESONIDE AND FORMOTEROL FUMARATE DIHYDRATE SCH PUFF: 160; 4.5 AEROSOL RESPIRATORY (INHALATION) at 09:23

## 2018-03-15 RX ADMIN — LEVOFLOXACIN SCH MLS/HR: 5 INJECTION, SOLUTION INTRAVENOUS at 22:28

## 2018-03-15 RX ADMIN — PANTOPRAZOLE SCH MG: 40 TABLET, DELAYED RELEASE ORAL at 20:11

## 2018-03-15 RX ADMIN — BUDESONIDE AND FORMOTEROL FUMARATE DIHYDRATE SCH PUFF: 160; 4.5 AEROSOL RESPIRATORY (INHALATION) at 20:12

## 2018-03-15 RX ADMIN — GABAPENTIN SCH MG: 400 CAPSULE ORAL at 12:12

## 2018-03-15 RX ADMIN — PROMETHAZINE HYDROCHLORIDE PRN MG: 25 TABLET ORAL at 05:42

## 2018-03-15 RX ADMIN — ATENOLOL SCH MG: 50 TABLET ORAL at 09:22

## 2018-03-15 RX ADMIN — GABAPENTIN SCH MG: 400 CAPSULE ORAL at 18:37

## 2018-03-15 RX ADMIN — Medication SCH MG: at 09:00

## 2018-03-15 RX ADMIN — PROMETHAZINE HYDROCHLORIDE PRN MG: 25 TABLET ORAL at 18:36

## 2018-03-15 RX ADMIN — PROMETHAZINE HYDROCHLORIDE PRN MG: 25 TABLET ORAL at 12:13

## 2018-03-16 VITALS
RESPIRATION RATE: 18 BRPM | HEART RATE: 68 BPM | DIASTOLIC BLOOD PRESSURE: 86 MMHG | SYSTOLIC BLOOD PRESSURE: 124 MMHG | OXYGEN SATURATION: 98 % | TEMPERATURE: 97.5 F

## 2018-03-16 VITALS
SYSTOLIC BLOOD PRESSURE: 115 MMHG | TEMPERATURE: 97.6 F | RESPIRATION RATE: 20 BRPM | HEART RATE: 72 BPM | OXYGEN SATURATION: 93 % | DIASTOLIC BLOOD PRESSURE: 56 MMHG

## 2018-03-16 VITALS
OXYGEN SATURATION: 95 % | HEART RATE: 70 BPM | TEMPERATURE: 97.9 F | SYSTOLIC BLOOD PRESSURE: 118 MMHG | DIASTOLIC BLOOD PRESSURE: 66 MMHG | RESPIRATION RATE: 18 BRPM

## 2018-03-16 VITALS
TEMPERATURE: 97.3 F | HEART RATE: 68 BPM | OXYGEN SATURATION: 94 % | SYSTOLIC BLOOD PRESSURE: 127 MMHG | DIASTOLIC BLOOD PRESSURE: 79 MMHG | RESPIRATION RATE: 18 BRPM

## 2018-03-16 VITALS
HEART RATE: 73 BPM | SYSTOLIC BLOOD PRESSURE: 109 MMHG | TEMPERATURE: 97.3 F | OXYGEN SATURATION: 93 % | DIASTOLIC BLOOD PRESSURE: 62 MMHG | RESPIRATION RATE: 20 BRPM

## 2018-03-16 VITALS — OXYGEN SATURATION: 92 %

## 2018-03-16 LAB
BUN SERPL-MCNC: 25 MG/DL (ref 7–18)
CALCIUM SERPL-MCNC: 9.2 MG/DL (ref 8.5–10.1)
CHLORIDE SERPL-SCNC: 104 MEQ/L (ref 98–107)
CREAT SERPL-MCNC: 1.06 MG/DL (ref 0.5–1)
ERYTHROCYTE [DISTWIDTH] IN BLOOD BY AUTOMATED COUNT: 21.1 % (ref 11.6–17.2)
GFR SERPLBLD BASED ON 1.73 SQ M-ARVRAT: 57 ML/MIN (ref 89–?)
GLUCOSE SERPL-MCNC: 80 MG/DL (ref 74–106)
HCO3 BLD-SCNC: 24.6 MEQ/L (ref 21–32)
HCT VFR BLD CALC: 36.4 % (ref 35–46)
HGB BLD-MCNC: 11.9 GM/DL (ref 11.6–15.3)
MCH RBC QN AUTO: 28.1 PG (ref 27–34)
MCHC RBC AUTO-ENTMCNC: 32.7 % (ref 32–36)
MCV RBC AUTO: 86.1 FL (ref 80–100)
PLATELET # BLD: 473 TH/MM3 (ref 150–450)
PMV BLD AUTO: 7.1 FL (ref 7–11)
RBC # BLD AUTO: 4.23 MIL/MM3 (ref 4–5.3)
SODIUM SERPL-SCNC: 138 MEQ/L (ref 136–145)
WBC # BLD AUTO: 7.1 TH/MM3 (ref 4–11)

## 2018-03-16 RX ADMIN — GABAPENTIN SCH MG: 400 CAPSULE ORAL at 12:27

## 2018-03-16 RX ADMIN — BUTALBITAL, ACETAMINOPHEN, AND CAFFEINE PRN TAB: 50; 325; 40 TABLET ORAL at 06:17

## 2018-03-16 RX ADMIN — MUPIROCIN CALCIUM SCH APPLIC: 20 OINTMENT TOPICAL at 08:15

## 2018-03-16 RX ADMIN — BUTALBITAL, ACETAMINOPHEN, AND CAFFEINE PRN TAB: 50; 325; 40 TABLET ORAL at 14:45

## 2018-03-16 RX ADMIN — OXYCODONE HYDROCHLORIDE AND ACETAMINOPHEN PRN TAB: 10; 325 TABLET ORAL at 12:27

## 2018-03-16 RX ADMIN — PANTOPRAZOLE SCH MG: 40 TABLET, DELAYED RELEASE ORAL at 08:15

## 2018-03-16 RX ADMIN — OXYCODONE HYDROCHLORIDE AND ACETAMINOPHEN PRN TAB: 10; 325 TABLET ORAL at 18:30

## 2018-03-16 RX ADMIN — Medication SCH ML: at 07:24

## 2018-03-16 RX ADMIN — IPRATROPIUM BROMIDE AND ALBUTEROL SULFATE SCH AMPULE: .5; 3 SOLUTION RESPIRATORY (INHALATION) at 03:55

## 2018-03-16 RX ADMIN — IPRATROPIUM BROMIDE AND ALBUTEROL SULFATE SCH AMPULE: .5; 3 SOLUTION RESPIRATORY (INHALATION) at 15:15

## 2018-03-16 RX ADMIN — GUAIFENESIN SCH MG: 600 TABLET, EXTENDED RELEASE ORAL at 08:15

## 2018-03-16 RX ADMIN — BUDESONIDE AND FORMOTEROL FUMARATE DIHYDRATE SCH PUFF: 160; 4.5 AEROSOL RESPIRATORY (INHALATION) at 08:17

## 2018-03-16 RX ADMIN — GABAPENTIN SCH MG: 400 CAPSULE ORAL at 18:06

## 2018-03-16 RX ADMIN — OXYCODONE HYDROCHLORIDE AND ACETAMINOPHEN PRN TAB: 10; 325 TABLET ORAL at 06:17

## 2018-03-16 RX ADMIN — ATENOLOL SCH MG: 50 TABLET ORAL at 08:15

## 2018-03-16 RX ADMIN — LIDOCAINE SCH PATCH: 50 PATCH CUTANEOUS at 08:17

## 2018-03-16 RX ADMIN — PROMETHAZINE HYDROCHLORIDE PRN MG: 25 TABLET ORAL at 00:45

## 2018-03-16 RX ADMIN — OXYCODONE HYDROCHLORIDE AND ACETAMINOPHEN PRN TAB: 10; 325 TABLET ORAL at 00:46

## 2018-03-16 RX ADMIN — PROMETHAZINE HYDROCHLORIDE PRN MG: 25 TABLET ORAL at 06:17

## 2018-03-16 RX ADMIN — GABAPENTIN SCH MG: 400 CAPSULE ORAL at 06:17

## 2018-03-16 RX ADMIN — GABAPENTIN SCH MG: 400 CAPSULE ORAL at 00:45

## 2018-03-16 RX ADMIN — Medication SCH MG: at 08:15

## 2018-03-16 RX ADMIN — PROMETHAZINE HYDROCHLORIDE PRN MG: 25 TABLET ORAL at 14:45

## 2018-03-16 NOTE — HHI.DS
__________________________________________________





Discharge Summary


Admission Date


Mar 13, 2018 at 04:58


Admitting Diagnosis





COPD EXACERBATION WITH HYPOXEMIA, PNEUMONIA





(1) PNA (pneumonia)


ICD Code:  J18.9 - Pneumonia, unspecified organism


Status:  Acute


(2) HTN (hypertension)


ICD Code:  I10 - Essential (primary) hypertension


Status:  Chronic


(3) Acute respiratory failure


ICD Code:  J96.00 - Acute respiratory failure, unspecified whether with hypoxia 

or hypercapnia


Status:  Acute


Procedures


None


Brief History - From Admission


42-year-old female with a history of hypertension, COPD, anxiety and depression 

presented to the ED with complaints of shortness of breath. 





Patient states for the last 3-4 days she has been experiencing increased 

shortness of breath, cough and green sputum production.  She states she has 

been taking her inhalers and nebulizer treatments at home without any relief.  

She complains of pain to her bilateral ribs, 10 out of 10, aching, worse with 

taking a deep breath and coughing.  She states she normally takes Percocet at 

home but the pain is so severe the Percocet are not helping.  She denies any 

chest pain.  She does state she had fevers and chills at home but is unsure how 

high her fever was.





Patient was seen on February 28 and diagnosed with pneumonia she was discharged 

home for having drug-seeking behavior and was discharged on oral antibiotics.


CBC/BMP:  


3/16/18 0746                                                                   

             3/16/18 0746





Significant Findings





Laboratory Tests








Test


  3/14/18


16:09 3/14/18


22:27 3/15/18


11:04 3/16/18


07:46


 


White Blood Count


  15.6 TH/MM3


(4.0-11.0) 


  


  


 


 


Red Blood Count


  3.54 MIL/MM3


(4.00-5.30) 


  


  


 


 


Hemoglobin


  9.8 GM/DL


(11.6-15.3) 10.0 GM/DL


(11.6-15.3) 


  


 


 


Hematocrit


  30.3 %


(35.0-46.0) 


  


  


 


 


Red Cell Distribution Width


  21.4 %


(11.6-17.2) 


  21.6 %


(11.6-17.2) 21.1 %


(11.6-17.2)


 


Platelet Count


  531 TH/MM3


(150-450) 


  545 TH/MM3


(150-450) 473 TH/MM3


(150-450)


 


Mean Corpuscular Hemoglobin


Concent 


  


  31.8 %


(32.0-36.0) 


 


 


Mean Platelet Volume


  


  


  6.8 FL


(7.0-11.0) 


 


 


Blood Urea Nitrogen


  


  


  21 MG/DL


(7-18) 25 MG/DL


(7-18)


 


Estimat Glomerular Filtration


Rate 


  


  79 ML/MIN


(>89) 57 ML/MIN


(>89)


 


Creatinine


  


  


  


  1.06 MG/DL


(0.50-1.00)








PE at Discharge


GENERAL: in NAD


HEENT:nasal canal no blood noted. 


CARDIOVASCULAR: Regular rate and rhythm without murmurs, gallops, or rubs. 


RESPIRATORY: Breath sounds equal bilaterally. No accessory muscle use.


GASTROINTESTINAL: Abdomen soft, non-tender, nondistended. 


MUSCULOSKELETAL: No cyanosis, or edema. 


BACK: Nontender without obvious deformity. No CVA tenderness.





Pt Condition on Discharge:  Stable


Discharge Disposition:  Discharge Home


Discharge Instructions


DIET: Follow Instructions for:  Heart Healthy Diet


Activities you can perform:  Regular-No Restrictions











Kami Da Silva MD Mar 16, 2018 18:25

## 2018-03-16 NOTE — HHI.DCPOC
Discharge Care Plan


Diagnosis:  


(1) Epistaxis


(2) PNA (pneumonia)


(3) Acute respiratory failure


Goals to Promote Your Health


* To prevent worsening of your condition and complications


* To maintain your health at the optimal level


Directions to Meet Your Goals


*** Take your medications as prescribed


*** Follow your dietary instruction


*** Follow activity as directed








*** Keep your appointments as scheduled


*** Take your immunizations and boosters as scheduled


*** If your symptoms worsen call your PCP, if no PCP go to Urgent Care Center 

or Emergency Room***


*** Smoking is Dangerous to Your Health. Avoid second hand smoke***


***Call the 24-hour hour crisis hotline for domestic abuse at 1-708.953.9721***











Kami Da Silva MD Mar 16, 2018 18:23

## 2018-03-16 NOTE — HHI.PR
Subjective


Remarks


Follow-up for COPD exacerbation and pneumonia.


Patient has no complaints she states she feels the same.  Continues to have 

cough.  Denies shortness of breathing.


Patient asked that she is going home today.  When I told patient the plan she 

stated that she will need prescription for medication.  I told patient I can 

give her medication that was prescribed here for her acute illness.  She then 

asked for prescription for Percocet and clonidine.  I told patient since these 

are chronic medication she has to get these medication from the provider who is 

prescribing her these medication.  She got very upset and asked to see my 

supervisor.  She stated that she did not want to see me if I was not going to 

prescribe these medication to her.  I spoke to Leslie nurse manager in regards to 

this.  Per Leslie.  He spoke to the patient earlier and patient was complaining 

that her ovaries were hurting.  Patient did not complain of any pain to me or 

to her nurse.  Per MDR meeting patient was being very nasty to the nutrition 

because she did not like the food choice.


Patient is lying very comfortably in bed and she is not on any oxygen.





Objective


Vitals





Vital Signs








  Date Time  Temp Pulse Resp B/P (MAP) Pulse Ox O2 Delivery O2 Flow Rate FiO2


 


3/16/18 15:15     92 Nasal Cannula 2.00 


 


3/16/18 12:00 97.3 68 18 127/79 (95) 94   


 


3/16/18 08:00 97.5 68 18 124/86 (99) 98   


 


3/16/18 04:02 97.6 72 20 115/56 (75) 93   


 


3/16/18 04:00      Room Air  


 


3/16/18 00:04 97.3 73 20 109/62 (78) 93   


 


3/16/18 00:00      Room Air  


 


3/15/18 21:38     92   


 


3/15/18 20:00      Room Air  


 


3/15/18 20:00  79      


 


3/15/18 18:00 98.1 74 20  98   


 


3/15/18 16:11 98.0 57 18 158/100 (119) 91   


 


3/15/18 16:00   20     


 


3/15/18 15:26     96 Nasal Cannula 2.00 














I/O      


 


 3/15/18 3/15/18 3/15/18 3/16/18 3/16/18 3/16/18





 07:00 15:00 23:00 07:00 15:00 23:00


 


Intake Total 550 ml  630 ml 344 ml  


 


Output Total 400 ml     


 


Balance 150 ml  630 ml 344 ml  


 


      


 


Intake Oral 400 ml  480 ml 344 ml  


 


IV Total 150 ml  150 ml   


 


Output Urine Total 400 ml     


 


# Voids   2   


 


# Bowel Movements   1   








Result Diagram:  


3/16/18 0746                                                                   

             3/16/18 0746





Objective Remarks


GENERAL: in NAD


HEENT:nasal canal no blood noted. 


CARDIOVASCULAR: Regular rate and rhythm without murmurs, gallops, or rubs. 


RESPIRATORY: Breath sounds equal bilaterally. No accessory muscle use.


GASTROINTESTINAL: Abdomen soft, non-tender, nondistended. 


MUSCULOSKELETAL: No cyanosis, or edema. 


BACK: Nontender without obvious deformity. No CVA tenderness.





Procedures


None


Medications and IVs





Current Medications


Albuterol/ Ipratropium (Duoneb Neb) 1 ampule Q15M INH  Last administered on 3/12

/18at 22:38;  Start 3/12/18 at 21:30;  Stop 3/12/18 at 22:01;  Status DC


Methylprednisolone Sodium Succinate (SoluMEDROL INJ) 125 mg ONCE  ONCE IV PUSH  

Last administered on 3/12/18at 22:45;  Start 3/12/18 at 22:00;  Stop 3/12/18 at 

22:01;  Status DC


Albuterol Sulfate (Albuterol Neb) 2.5 mg Q15M INH ;  Start 3/12/18 at 22:00;  

Stop 3/12/18 at 22:16;  Status DC


Magnesium Sulfate/ Dextrose 100 ml @  100 mls/hr ONCE  ONCE IV  Last 

administered on 3/12/18at 22:45;  Start 3/12/18 at 22:00;  Stop 3/12/18 at 22:59

;  Status DC


Clonazepam (KlonoPIN) 1 mg ONCE  ONCE PO  Last administered on 3/12/18at 22:45;

  Start 3/12/18 at 22:15;  Stop 3/12/18 at 22:16;  Status DC


Oxycodone/ Acetaminophen (Percocet  5-325 Mg) 1 tab ONCE  ONCE PO  Last 

administered on 3/12/18at 22:46;  Start 3/12/18 at 22:15;  Stop 3/12/18 at 22:16

;  Status DC


Levofloxacin/ Dextrose 150 ml @  100 mls/hr ONCE  ONCE IV  Last administered on 

3/12/18at 22:46;  Start 3/12/18 at 22:30;  Stop 3/12/18 at 23:59;  Status DC


Sodium Chloride (NS Flush) 2 ml UNSCH  PRN IV FLUSH FLUSH AFTER USING IV ACCESS

;  Start 3/12/18 at 23:15


Sodium Chloride (NS Flush) 2 ml BID IV FLUSH  Last administered on 3/16/18at 07:

24;  Start 3/13/18 at 09:00


Naloxone HCl (Narcan Inj) 0.4 mg UNSCH  PRN IV PUSH SEE LABEL COMMENTS;  Start 3

/12/18 at 23:15


Levofloxacin/ Dextrose 150 ml @  100 mls/hr Q24H IV  Last administered on 3/15/

18at 22:28;  Start 3/13/18 at 23:00


Albuterol/ Ipratropium (Duoneb Neb) 1 ampule Q6HR  NEB NEB  Last administered 

on 3/16/18at 15:15;  Start 3/13/18 at 04:00


Albuterol/ Ipratropium (Duoneb Neb) 1 ampule Q2HR NEB  PRN NEB wheezing;  Start 

3/12/18 at 23:30


Ondansetron HCl (Zofran  Odt) 4 mg ONCE  ONCE PO ;  Start 3/13/18 at 04:30;  

Stop 3/13/18 at 05:03;  Status DC


Acetaminophen/ Hydrocodone Bitart (Norco  5-325 Mg) 1 tab ONCE  ONCE PO ;  

Start 3/13/18 at 04:30;  Stop 3/13/18 at 05:03;  Status DC


Morphine Sulfate (Morphine Inj) 2 mg ONCE  ONCE IV  Last administered on 3/13/

18at 05:18;  Start 3/13/18 at 05:15;  Stop 3/13/18 at 05:16;  Status DC


Guaifenesin (Mucinex Er) 600 mg BID PO  Last administered on 3/16/18at 08:15;  

Start 3/13/18 at 09:00


Atenolol (Tenormin) 50 mg BID PO  Last administered on 3/16/18at 08:15;  Start 3

/13/18 at 09:00


Budesonide/ Formoterol Fumarate (Symbicort 160-4.5 Mcg Inh) 2 puff Q12HR INH  

Last administered on 3/16/18at 08:17;  Start 3/13/18 at 09:00


Clonazepam (KlonoPIN) 1 mg TID PO  Last administered on 3/16/18at 12:27;  Start 

3/13/18 at 09:00


Clonidine (Catapres) 0.2 mg TID PO  Last administered on 3/16/18at 12:27;  

Start 3/13/18 at 09:00


Fluoxetine HCl (PROzac) 40 mg DAILY PO  Last administered on 3/16/18at 08:16;  

Start 3/13/18 at 09:00


Gabapentin (Neurontin) 800 mg Q6HR PO  Last administered on 3/16/18at 12:27;  

Start 3/13/18 at 06:00


Oxycodone/ Acetaminophen (Percocet  Mg) 1 tab Q6H  PRN PO PAIN Last 

administered on 3/16/18at 12:27;  Start 3/13/18 at 05:45


Pantoprazole Sodium (Protonix) 40 mg BID PO  Last administered on 3/16/18at 08:

15;  Start 3/13/18 at 09:00


Heparin Sodium (Porcine) (Heparin Inj) 5,000 units Q12HR SQ  Last administered 

on 3/13/18at 20:40;  Start 3/13/18 at 09:00;  Status Future Hold


Naproxen Sodium (Anaprox Ds) 550 mg BID PO  Last administered on 3/16/18at 08:15

;  Start 3/13/18 at 11:00


Methylprednisolone Sodium Succinate (SoluMEDROL INJ) 40 mg Q6HR IV PUSH  Last 

administered on 3/14/18at 06:04;  Start 3/13/18 at 12:00;  Stop 3/14/18 at 11:26

;  Status DC


Zolpidem Tartrate (Ambien) 5 mg ONCE  ONCE PO  Last administered on 3/13/18at 22

:30;  Start 3/13/18 at 21:45;  Stop 3/13/18 at 21:47;  Status DC


Acetaminophen/ Butalbital/ Caffeine (Fioricet 325-50-40) 1 tab Q8H  PRN PO 

headaches Last administered on 3/16/18at 14:45;  Start 3/14/18 at 11:30


Zolpidem Tartrate (Ambien) 5 mg HS  PRN PO insomnia Last administered on 3/15/

18at 22:27;  Start 3/14/18 at 21:00


Methylprednisolone Sodium Succinate (SoluMEDROL INJ) 40 mg Q12HR IV PUSH  Last 

administered on 3/15/18at 09:22;  Start 3/14/18 at 21:00;  Stop 3/15/18 at 11:39

;  Status DC


Lidocaine HCl (Lidoderm 5% Patch.12 Hr) 1 patch DAILY T-DERMAL  Last 

administered on 3/16/18at 08:17;  Start 3/14/18 at 11:30


Miscellaneous Information 1 Q24H T-DERMAL ;  Start 3/14/18 at 21:00


Promethazine HCl (Phenergan) 25 mg Q6H  PRN PO nausea or vomiting Last 

administered on 3/16/18at 14:45;  Start 3/14/18 at 12:30


Mupirocin (Bactroban Nasal 2% Oint) 1 applic BID NASAL  Last administered on 3/

16/18at 08:15;  Start 3/15/18 at 09:30


Prednisone (Deltasone) 20 mg BID PO  Last administered on 3/16/18at 08:15;  

Start 3/15/18 at 21:00


Ketorolac Tromethamine (Toradol Inj) 30 mg ONCE  ONCE IV PUSH  Last 

administered on 3/15/18at 21:23;  Start 3/15/18 at 20:45;  Stop 3/15/18 at 21:08

;  Status DC





A/P


Problem List:  


(1) PNA (pneumonia)


ICD Code:  J18.9 - Pneumonia, unspecified organism


Status:  Acute


(2) HTN (hypertension)


ICD Code:  I10 - Essential (primary) hypertension


Status:  Chronic


(3) Acute respiratory failure


ICD Code:  J96.00 - Acute respiratory failure, unspecified whether with hypoxia 

or hypercapnia


Status:  Acute


Assessment and Plan


This is a 41 y/o SOB





Community-acquired pneumonia:


- Chest x-ray shows bilateral infiltrates. patient has not have any sputum to 

give sample during this hospital course.  Repeat chest x-ray on 3/15 shows no 

acute disease at all.


-Clinically patient is doing well her lungs are clear on exam she is in no 

respiratory distress.


-We will switch to oral antibiotics.





Epistaxis


-no active bleeding now.  most likely due to dryness from oxygen and patient 

causing irritation.  she requesting to see specialist and was seen by ENT 

doctor  and applied nasal packing and bacitracin.  Asymptomatic since 

that one episode.  Hemoglobin has also been stable.





COPD exacerbation with acute respiratory failure


-Patient now on oral medication.  Resolved.  Lungs are clear now.  She did fill 

the walk test yesterday.  She has not been on any oxygen today.  Will repeat 

walk test.





 Chronic pain: 


-Continue Percocet, which is what the patient takes at home.  She reportedly 

has a history of pain medication seeking behavior.  On Lidoderm patch.  This is 

chronic pain medication which she needs to follow-up with her provider who 

prescribed this medication.





HA


-Resolved.  On Fioricet as needed.





Insomnia


-On Ambien.  





Hypertension


-Continue clonidine, atenolol.





Anxiety


- Continue clonazepam, fluoxetine.  Patient told need to follow with her 

provider who provides the medication.  I will not prescribe these medications 

since this was not prescribed by me and this is a chronic medication.





Leukocytosis


-improved.





 DVT prophylaxis: Heparin. hold heparin due to nose bleed


Discharge Planning


Patient is medically stable/clear for discharge but felt to walk test 

yesterday.  She is self-pay there is no apparent source.  She has not been on 

oxygen so will repeat walk test.  This was discussed with the patient's nurse, 

patient herself, and respiratory therapist.





Patient asked to see another provider since I will not prescribe her as 

outpatient Percocet or clonidine.  Per hospital policy patient cannot have 

another provider due to this reason.





Problem Qualifiers





(1) PNA (pneumonia):  


Qualified Codes:  J18.9 - Pneumonia, unspecified organism


(2) Acute respiratory failure:  


Qualified Codes:  J96.01 - Acute respiratory failure with hypoxia








Kami Da Silva MD Mar 16, 2018 15:27

## 2018-04-13 ENCOUNTER — HOSPITAL ENCOUNTER (EMERGENCY)
Dept: HOSPITAL 17 - NEPC | Age: 43
Discharge: HOME | End: 2018-04-13
Payer: SELF-PAY

## 2018-04-13 VITALS
TEMPERATURE: 98.5 F | OXYGEN SATURATION: 100 % | SYSTOLIC BLOOD PRESSURE: 129 MMHG | DIASTOLIC BLOOD PRESSURE: 77 MMHG | RESPIRATION RATE: 18 BRPM | HEART RATE: 62 BPM

## 2018-04-13 DIAGNOSIS — M54.2: Primary | ICD-10-CM
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
## 2018-04-13 DIAGNOSIS — G89.29: ICD-10-CM

## 2018-04-13 DIAGNOSIS — Z72.0: ICD-10-CM

## 2018-04-13 DIAGNOSIS — Z88.1: ICD-10-CM

## 2018-04-13 DIAGNOSIS — R11.0: ICD-10-CM

## 2018-04-13 DIAGNOSIS — I10: ICD-10-CM

## 2018-04-13 DIAGNOSIS — Z88.2: ICD-10-CM

## 2018-04-13 DIAGNOSIS — J44.9: ICD-10-CM

## 2018-04-13 PROCEDURE — 99284 EMERGENCY DEPT VISIT MOD MDM: CPT

## 2018-04-13 PROCEDURE — 96375 TX/PRO/DX INJ NEW DRUG ADDON: CPT

## 2018-04-13 PROCEDURE — 96374 THER/PROPH/DIAG INJ IV PUSH: CPT

## 2018-04-13 PROCEDURE — 71045 X-RAY EXAM CHEST 1 VIEW: CPT

## 2018-04-13 PROCEDURE — 70450 CT HEAD/BRAIN W/O DYE: CPT

## 2018-04-13 PROCEDURE — 72125 CT NECK SPINE W/O DYE: CPT

## 2018-04-13 NOTE — RADRPT
EXAM DATE/TIME:  04/13/2018 22:28 

 

[facility] COMPARISON:     

No previous studies available for comparison.

 

                     

INDICATIONS :     

Cough and shortness of breath. 

                     

 

MEDICAL HISTORY :            

Chronic obstructive pulmonary disease.   

 

SURGICAL HISTORY :        

Fusion, cervical. Fusion, thoracic.

 

ENCOUNTER:     

Initial                                        

 

ACUITY:     

2 days      

 

PAIN SCORE:     

0/10

 

LOCATION:      

chest 

 

FINDINGS:     

A single view of the chest demonstrates the lungs to be symmetrically aerated without evidence of mas
s, infiltrate or effusion.  The cardiomediastinal contours are unremarkable.  Osseous structures are 
intact.

 

CONCLUSION:     No acute disease.  

 

 

 

 Fabio Alvarez MD on April 13, 2018 at 22:52           

Board Certified Radiologist.

 This report was verified electronically.

## 2018-04-13 NOTE — PD
Data


Data


Last Documented VS





Vital Signs








  Date Time  Temp Pulse Resp B/P (MAP) Pulse Ox O2 Delivery O2 Flow Rate FiO2


 


4/13/18 20:00 98.5 62 18 129/77 (94) 100   








Orders





 Orders


Ct Brain W/O Iv Contrast(Rout) (4/13/18 )


Ct Cerv Spine W/O Contrast (4/13/18 )


Chest, Single Ap (4/13/18 )


Iv Access Insert/Monitor (4/13/18 21:52)


Ketorolac Inj (Toradol Inj) (4/13/18 22:00)


Dexamethasone Inj (Decadron Inj) (4/13/18 22:00)


Prochlorperazine Inj (Compazine Inj) (4/13/18 22:15)


Sodium Chlor 0.9% 1000 Ml Inj (Ns 1000 M (4/13/18 22:15)


Ed Discharge Order (4/13/18 23:09)








MDM


Medical Record Reviewed:  Yes


Supervised Visit with EDEL:  Yes


Narrative Course


******* This report is in ERROR *******


 ***** Please disregard this report ****


  ******* and all prior copies ! ******





******* This report is in ERROR *******


 ***** Please disregard this report ****


  ******* and all prior copies ! ******





******* This report is in ERROR *******


 ***** Please disregard this report ****


  ******* and all prior copies ! ******











David Whitten MD Apr 13, 2018 22:30

## 2018-04-13 NOTE — RADRPT
EXAM DATE/TIME:  04/13/2018 22:17 

 

[facility] COMPARISON:     

No previous studies available for comparison.

 

 

INDICATIONS :     

Trauma; fall.

                      

 

RADIATION DOSE:     

56.35 CTDIvol (mGy) 

 

 

 

MEDICAL HISTORY :     

Chronic obstructive pulmonary disease. Hypertension. 

 

SURGICAL HISTORY :      

Hysterectomy. Appendectomy.Cervical fusion

 

ENCOUNTER:      

Initial

 

ACUITY:      

1 day

 

PAIN SCALE:      

10/10

 

LOCATION:        

cranial 

 

TECHNIQUE:     

Multiple contiguous axial images were obtained of the head.  Using automated exposure control and adj
ustment of the mA and/or kV according to patient size, radiation dose was kept as low as reasonably a
chievable to obtain optimal diagnostic quality images.   DICOM format image data is available electro
nically for review and comparison.  

 

FINDINGS:     

 

CEREBRUM:     

The ventricles are normal for age.  No evidence of midline shift, mass lesion, hemorrhage or acute in
farction.  No extra-axial fluid collections are seen.

 

POSTERIOR FOSSA:     

The cerebellum and brainstem are intact.  The 4th ventricle is midline.  The cerebellopontine angle i
s unremarkable.

 

EXTRACRANIAL:     

The visualized portion of the orbits is intact.

 

SKULL:     

The calvaria is intact.  No evidence of skull fracture.

 

CONCLUSION:     

1. No acute intracranial abnormalities. Posterior fusion across the craniocervical junction.

 

 

 

 Fabio Alvarez MD on April 13, 2018 at 22:33           

Board Certified Radiologist.

 This report was verified electronically.

## 2018-04-13 NOTE — PD
Data


Data


Last Documented VS





Vital Signs








  Date Time  Temp Pulse Resp B/P (MAP) Pulse Ox O2 Delivery O2 Flow Rate FiO2


 


4/13/18 20:00 98.5 62 18 129/77 (94) 100   








Orders





 Orders


Ct Brain W/O Iv Contrast(Rout) (4/13/18 )


Ct Cerv Spine W/O Contrast (4/13/18 )


Chest, Single Ap (4/13/18 )


Iv Access Insert/Monitor (4/13/18 21:52)


Ketorolac Inj (Toradol Inj) (4/13/18 22:00)


Dexamethasone Inj (Decadron Inj) (4/13/18 22:00)


Prochlorperazine Inj (Compazine Inj) (4/13/18 22:15)


Sodium Chlor 0.9% 1000 Ml Inj (Ns 1000 M (4/13/18 22:15)


Ed Discharge Order (4/13/18 23:09)








MDM


Medical Record Reviewed:  Yes


Supervised Visit with EDEL:  Yes


Narrative Course


I, Dr. Whitten, have reviewed the advance practice practitioner's 

documentation and am in agreement, met with the patient face to face, made the 

diagnosis, and the medical decision making was done by me. 


*My assessment and Findings: [-] 





Last Impressions








Head CT 4/13/18 0000 Signed





Impressions: 





 Service Date/Time:  Friday, April 13, 2018 22:17 - CONCLUSION:  1. No acute 





 intracranial abnormalities. Posterior fusion across the craniocervical 

junction. 





     Fabio Alvarez MD 


 


Chest X-Ray 4/13/18 0000 Signed





Impressions: 





 Service Date/Time:  Friday, April 13, 2018 22:28 - CONCLUSION: No acute 

disease. 





       Fabio Alvarez MD 


 


Cervical Spine CT 4/13/18 0000 Signed





Impressions: 





 Service Date/Time:  Friday, April 13, 2018 22:17 - CONCLUSION:  1. Stable 





 cervical spine CT compared with January 24. Extensive postoperative changes 

and 





 cervical deformities as above.     Fabio Alvarez MD 





The patient is resting comfortably and feels better, is alert and in no 

distress. The patients results and examination findings were discussed.  The 

repeat examination is unremarkable and benign. The history, exam, diagnostic 

testing, and current condition do not suggest any significant pathology to 

warrant further testing, continued ED treatment, admission, or surgical 

evaluation at this point. The vital signs have been stable. The patient does 

not have uncontrollable pain, intractable vomiting, or other significant 

symptoms. The patient's condition is stable and appropriate for discharge. The 

patient will pursue further outpatient evaluation with a primary care physician 

or other designated or consulting physician as indicated in the discharge 

instructions. The patient expressed understanding and was agreeable with this 

plan.


Diagnosis





 Primary Impression:  


 Fall


 Qualified Codes:  W19.XXXA - Unspecified fall, initial encounter


 Additional Impression:  


 Chronic pain


 Qualified Codes:  G89.29 - Other chronic pain


Referrals:  


Neurosurgeon


call for appointment


***Med/Other Pt SpecificInfo:  No Change to Meds


Disposition:  01 DISCHARGE HOME


Condition:  Stable











David Whitten MD Apr 13, 2018 23:09

## 2018-04-13 NOTE — PD
HPI


Chief Complaint:  Fall


Time Seen by Provider:  21:44


Travel History


International Travel<30 days:  No


Contact w/Intl Traveler<30days:  No


Traveled to known affect area:  No





History of Present Illness


HPI


Patient is a 43-year-old female presenting to the emergency department for 

evaluation of neck pain after she sustained a mechanical fall at 3 PM this 

afternoon.  Patient states she was on a step stool, either on the first or 

second step when she lost her balance and fell.  She denies any chest pain, 

dizziness, headache, visual changes prior to or after the fall.  Patient 

reports 8 out of 10 pain in her neck, more so on the left than the right.  Pain 

is constant and worse with movement patient took Goody's powder approximately 

30 minutes prior to arrival.  Patient denies any numbness or tingling in her 

upper extremities.  She denies any loss of consciousness.  Symptom onset was 

gradual, symptoms have worsened over the last several hours.  Patient reports 

she feels nauseated but has not vomited.  Patient then vomited for the first 

time on arrival.





PFSH


Past Medical History


Arthritis:  Yes


Bipolar Disorder:  Yes


Anxiety:  Yes


Depression:  Yes


Cardiovascular Problems:  Yes


COPD:  Yes


Fibromyalgia:  Yes


Gastrointestinal Disorders:  Yes (bleeding ulcers)


GERD:  Yes


Hypertension:  Yes


Insomnia:  Yes


Neurologic:  Yes


Migraines:  Yes


Seizures:  Yes


Ulcer:  Yes


Pregnant?:  Not Pregnant


:  4


Para:  2


Miscarriage:  2





Past Surgical History


Appendectomy:  Yes


 Section:  Yes


Hysterectomy:  Yes


Neurologic Surgery:  Yes (Cervical Fusionx2)


Other Surgery:  Yes





Social History


Alcohol Use:  No


Tobacco Use:  Yes


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





Allergies-Medications


(Allergen,Severity, Reaction):  


Coded Allergies:  


     azithromycin (Verified  Allergy, Severe, 18)


     ondansetron (Unverified  Allergy, Intermediate, VOMITING, 18)


     Sulfa (Sulfonamide Antibiotics) (Unverified  Adverse Reaction, Intermediate

, 18)


     cyclobenzaprine (Unverified  Adverse Reaction, Intermediate, 18)


     erythromycin base (Unverified  Adverse Reaction, Intermediate, 18)


     haloperidol (Unverified  Adverse Reaction, Intermediate, 18)


     propoxyphene (Unverified  Adverse Reaction, Intermediate, 18)


     sumatriptan (Unverified  Adverse Reaction, Intermediate, 18)


Reported Meds & Prescriptions





Reported Meds & Active Scripts


Active


Bactroban Nasal Oint (Mupirocin Nasal Oint) 2% Oint 1 Applic NASAL BID


     Single-use tubes.


Lidoderm (Lidocaine) 5 % Adh..patch 1 Patch T-DERMAL DAILY


Prednisone 20 Mg Tab 20 Mg PO BID


Levaquin (Levofloxacin) 750 Mg Tablet 750 Mg PO Q24H


Symbicort Inh (Budesonide/Formoterol Fumarate) 160-4.5 Mcg/Act Aero 2 Puff INH 

Q12HR


Reported


Ambien (Zolpidem Tartrate) 5 Mg Tab 5 Mg PO HS PRN


Prilosec (Omeprazole Magnesium) 20 Mg Tab 40 Mg PO BID


Percocet (Oxycodone-Acetaminophen)  mg Tab 1 Tab PO Q6H PRN


Fioricet (Butalbital-Acetaminophen-Caffeine) Unknown Strength Cap Unknown Dose 

PO Q4H PRN


Klonopin (Clonazepam) 1 Mg Tab 1 Mg PO TID


Gabapentin 800 Mg Tab 800 Mg PO Q6HR


Fluoxetine (Fluoxetine HCl) 40 Mg Cap 40 Cap PO DAILY


Atenolol 50 Mg Tab 50 Mg PO BID


Clonidine (Clonidine HCl) 0.2 Mg Tab 0.2 Mg PO TID








Review of Systems


Except as stated in HPI:  all other systems reviewed are Neg


Eyes:  No: Blurred Vision


HENT:  Positive: Neck Stiffness, Neck Pain, No: Headaches


Cardiovascular:  No: Chest Pain or Discomfort


Respiratory:  Positive: Cough


Gastrointestinal:  Positive: Nausea, Vomiting, No: Abdominal Pain


Genitourinary:  No: Dysuria





Physical Exam


Narrative


GENERAL: Well-developed, well-nourished, alert  female.  Presenting in 

no acute distress.


SKIN: Warm and dry.


HEAD: Atraumatic. Normocephalic. 


EYES: Pupils equal and round. No scleral icterus. No injection or drainage. 


ENT: No nasal bleeding or discharge.  Mucous membranes pink and moist.


NECK: Trachea midline. No JVD.  Tenderness to palpation paraspinal musculature 

and cervical region on the left side.


CARDIOVASCULAR: Regular rate and rhythm.  


RESPIRATORY: No accessory muscle use.  Scattered expiratory wheezes. Breath 

sounds equal bilaterally. 


GASTROINTESTINAL: Abdomen soft, non-tender, nondistended. Hepatic and splenic 

margins not palpable. 


MUSCULOSKELETAL: Extremities without clubbing, cyanosis, or edema. No obvious 

deformities.  No spinal tenderness or step-off noted.  Tenderness to palpation 

left paraspinal musculature in the thoracic region


NEUROLOGICAL: Awake and alert. No obvious cranial nerve deficits.  Motor 

grossly within normal limits. Five out of 5 muscle strength in the arms and 

legs.  Normal speech.


PSYCHIATRIC: Appropriate mood and affect; insight and judgment normal.





Data


Data


Last Documented VS





Vital Signs








  Date Time  Temp Pulse Resp B/P (MAP) Pulse Ox O2 Delivery O2 Flow Rate FiO2


 


18 20:00 98.5 62 18 129/77 (94) 100   








Orders





 Orders


Ct Brain W/O Iv Contrast(Rout) (18 )


Ct Cerv Spine W/O Contrast (18 )


Chest, Single Ap (18 )


Iv Access Insert/Monitor (18 21:52)


Ketorolac Inj (Toradol Inj) (18 22:00)


Dexamethasone Inj (Decadron Inj) (18 22:00)


Prochlorperazine Inj (Compazine Inj) (18 22:15)


Sodium Chlor 0.9% 1000 Ml Inj (Ns 1000 M (18 22:15)


Ed Discharge Order (18 23:09)








MDM


Medical Decision Making


Medical Screen Exam Complete:  Yes


Emergency Medical Condition:  Yes


Interpretation(s)





Vital Signs








  Date Time  Temp Pulse Resp B/P (MAP) Pulse Ox O2 Delivery O2 Flow Rate FiO2


 


18 20:00 98.5 62 18 129/77 (94) 100   








Differential Diagnosis


Sprain versus strain versus versus radiculopathy versus fracture versus other


Narrative Course


Patient is a 43-year-old female presenting to the emergency department for 

evaluation of neck pain after sustaining a mechanical fall.  Patient has a 

history of drug addiction and declined narcotic medication.  CT's and 

medications ordered. Care of patient transferred to my attending physician who 

will determine patients disposition.











Digna Silva 2018 22:07

## 2018-04-13 NOTE — RADRPT
EXAM DATE/TIME:  04/13/2018 22:17 

 

[facility] COMPARISON:     

No previous studies available for comparison.

 

 

INDICATIONS :     

Trauma; fall.

                      

 

RADIATION DOSE:     

30.99 CTDIvol (mGy) 

 

 

 

MEDICAL HISTORY :     

Chronic obstructive pulmonary disease. Hypertension. 

 

SURGICAL HISTORY :      

Hysterectomy. Appendectomy.Cervical fusion

 

ENCOUNTER:      

Initial

 

ACUITY:      

1 day

 

PAIN SCALE:      

10/10

 

LOCATION:        

neck 

 

TECHNIQUE:     

Volumetric scanning of the cervical spine was performed. Multiplanar reconstructions in the sagittal,
 coronal and oblique axial planes were performed.   Using automated exposure control and adjustment o
f the mA and/or kV according to patient size, radiation dose was kept as low as reasonably achievable
 to obtain optimal diagnostic quality images.   DICOM format image data is available electronically f
or review and comparison.  

 

FINDINGS:     

Comparison January 24. No significant interval change. Again seen is previous metallic posterior cerv
ical fusion and thoracic fusion extending from the occipital region through T2. There is also anterio
r cervical fusion extending from C2-C5. Abnormal angular deformity between C2 and C3 is stable. Compr
ession deformity of C5 is stable. Unroofing of the posterior elements again noted.

 

No significant central bony canal stenosis. Stable abnormal kyphosis between C3 and C7.

 

CONCLUSION:     

1. Stable cervical spine CT compared with January 24. Extensive postoperative changes and cervical de
formities as above.

 

 

 

 Fabio Alvarez MD on April 13, 2018 at 22:56           

Board Certified Radiologist.

 This report was verified electronically.

## 2018-04-22 ENCOUNTER — HOSPITAL ENCOUNTER (EMERGENCY)
Dept: HOSPITAL 17 - NEPD | Age: 43
Discharge: LEFT BEFORE DISCHARGE | End: 2018-04-22
Payer: SELF-PAY

## 2018-04-22 VITALS
DIASTOLIC BLOOD PRESSURE: 67 MMHG | HEART RATE: 84 BPM | RESPIRATION RATE: 14 BRPM | SYSTOLIC BLOOD PRESSURE: 135 MMHG | TEMPERATURE: 98.4 F | OXYGEN SATURATION: 100 %

## 2018-04-22 VITALS — BODY MASS INDEX: 32.46 KG/M2 | HEIGHT: 64 IN | WEIGHT: 190.15 LBS

## 2018-04-22 DIAGNOSIS — J44.9: ICD-10-CM

## 2018-04-22 DIAGNOSIS — G89.29: Primary | ICD-10-CM

## 2018-04-22 DIAGNOSIS — Z98.1: ICD-10-CM

## 2018-04-22 DIAGNOSIS — M25.561: ICD-10-CM

## 2018-04-22 DIAGNOSIS — F31.9: ICD-10-CM

## 2018-04-22 DIAGNOSIS — K21.9: ICD-10-CM

## 2018-04-22 DIAGNOSIS — I10: ICD-10-CM

## 2018-04-22 DIAGNOSIS — M19.90: ICD-10-CM

## 2018-04-22 DIAGNOSIS — M79.7: ICD-10-CM

## 2018-04-22 PROCEDURE — 99281 EMR DPT VST MAYX REQ PHY/QHP: CPT

## 2018-04-22 NOTE — PD
HPI


Chief Complaint:  Pain: Acute or Chronic


Time Seen by Provider:  16:35


Travel History


International Travel<30 days:  No


Contact w/Intl Traveler<30days:  No


Traveled to known affect area:  No





History of Present Illness


HPI


43-year-old  female presents emergency department complaining of right 

knee pain status post fall 3 weeks ago.  Patient has history of cervical fusion

, she is on gabapentin, clonidine, and has been taking Goody powder.  She comes 

in today as she is concerned about her knee pain.  She states it feels like 

"bone-on-bone" her pain is rated as a 7 out of 10.  Patient states she cannot 

take NSAIDs or prednisone due to her "bleeding ulcers".  She is allergic to 

sulfa, azithromycin, cyclobenzaprine, allopurinol, ondansetron, propoxyphene, 

sumatriptan, and erythromycin.





PFSH


Past Medical History


Arthritis:  Yes


Bipolar Disorder:  Yes


Anxiety:  Yes


Depression:  Yes


Heart Rhythm Problems:  Yes (tachycardic arrythmia)


COPD:  Yes


Fibromyalgia:  Yes


Gastrointestinal Disorders:  Yes (bleeding ulcers)


GERD:  Yes


Hypertension:  Yes


Insomnia:  Yes


Neurologic:  Yes


Respiratory:  Yes (COPD)


Migraines:  Yes


Seizures:  Yes


Ulcer:  Yes


Tetanus Vaccination:  < 5 Years


Influenza Vaccination:  No


Pregnant?:  Not Pregnant


:  4


Para:  2


Miscarriage:  2





Past Surgical History


Appendectomy:  Yes


 Section:  Yes


Gynecologic Surgery:  Yes (laproscopic endometriosis repair)


Hysterectomy:  Yes


Neurologic Surgery:  Yes (Cervical Fusionx2)


Other Surgery:  Yes





Social History


Alcohol Use:  No


Tobacco Use:  No


Substance Use:  No





Allergies-Medications


(Allergen,Severity, Reaction):  


Coded Allergies:  


     azithromycin (Verified  Allergy, Severe, 18)


     ondansetron (Unverified  Allergy, Intermediate, VOMITING, 18)


     Sulfa (Sulfonamide Antibiotics) (Unverified  Adverse Reaction, Intermediate

, 18)


     cyclobenzaprine (Unverified  Adverse Reaction, Intermediate, 18)


     erythromycin base (Unverified  Adverse Reaction, Intermediate, 18)


     haloperidol (Unverified  Adverse Reaction, Intermediate, 18)


     propoxyphene (Unverified  Adverse Reaction, Intermediate, 18)


     sumatriptan (Unverified  Adverse Reaction, Intermediate, 18)


Reported Meds & Prescriptions





Reported Meds & Active Scripts


Active


Reported


Prozac (Fluoxetine HCl) 40 Mg Cap 80 Mg PO DAILY


Prilosec (Omeprazole Magnesium) 20 Mg Tab 40 Mg PO BID


Gabapentin 800 Mg Tab 800 Mg PO Q6HR


Atenolol 50 Mg Tab 50 Mg PO BID


Clonidine (Clonidine HCl) 0.2 Mg Tab 0.2 Mg PO TID








Review of Systems


Except as stated in HPI:  all other systems reviewed are Neg


General / Constitutional:  No: Fever


Eyes:  No: Visual changes


HENT:  No: Headaches


Cardiovascular:  No: Chest Pain or Discomfort


Respiratory:  No: Shortness of Breath


Gastrointestinal:  No: Abdominal Pain


Genitourinary:  No: Dysuria


Musculoskeletal:  Positive: Arthralgias, Limited ROM, Pain


Skin:  No Rash


Neurologic:  No: Weakness


Psychiatric:  No: Depression


Endocrine:  No: Polydipsia


Hematologic/Lymphatic:  No: Easy Bruising





Physical Exam


Narrative


GENERAL: Patient appears in no obvious distress per


SKIN: Warm and dry.  Normal color.  Normal turgor.


HEAD: Atraumatic. Normocephalic. 


EYES: Pupils equal and round. No scleral icterus. No injection or drainage. 


ENT: No nasal bleeding or discharge.  Mucous membranes pink and moist.


NECK: Trachea midline.  Nontender with palpation.  Large scars noted from 

previous cervical neck surgery.


CARDIOVASCULAR: Regular rate and rhythm.  


RESPIRATORY: No accessory muscle use. Clear to auscultation. Breath sounds 

equal bilaterally. 


GASTROINTESTINAL: Abdomen soft, non-tender, nondistended. Hepatic and splenic 

margins not palpable. 


MUSCULOSKELETAL: Extremities without clubbing, cyanosis, or edema. No obvious 

deformities.  The right knee appears to have no effusion.  There is no 

increased erythema or warmth.  There is no ecchymosis.


NEUROLOGICAL: Awake and alert. No obvious cranial nerve deficits.  Motor 

grossly within normal limits. Five out of 5 muscle strength in the arms and 

legs.  Normal speech.


PSYCHIATRIC: Appropriate mood and affect; insight and judgment normal.





Data


Data


Last Documented VS





Vital Signs








  Date Time  Temp Pulse Resp B/P (MAP) Pulse Ox O2 Delivery O2 Flow Rate FiO2


 


18 16:21 98.4 84 14 135/67 (89) 100   











MDM


Medical Decision Making


Medical Screen Exam Complete:  Yes


Emergency Medical Condition:  No


Differential Diagnosis


Left knee arthrosis.  Left knee pain.  Drug-seeking behavior.


Narrative Course


Patient appears in no acute distress.


Patient is noted to be able to ambulate to the room and to the bathroom and 

back without difficulty.


When I question the patient why she did not come in 3 weeks ago when she fell 

she gets upset.


I explained to the patient I do not feel this is an emergent situation, and 

offered her to give her ibuprofen or Tylenol if she wished, but she refused due 

to her "gastric ulcers."


I explained to her that I can give her prednisone instead, and she states "I do 

not want prednisone.


When I explained to the patient that I could do x-rays if she wished, but I 

felt she had chronic arthritis, she became upset.


When I stated that I would not prescribe narcotics for chronic knee pain, she 

chose to leave AMA.





Diagnosis





 Primary Impression:  


 Chronic pain of right knee


Disposition:  07 AGAINST MEDICAL ADVICE


Condition:  Scott Nation 2018 17:02

## 2018-05-22 ENCOUNTER — HOSPITAL ENCOUNTER (EMERGENCY)
Dept: HOSPITAL 17 - NEPE | Age: 43
Discharge: LEFT BEFORE DISCHARGE | End: 2018-05-22
Payer: SELF-PAY

## 2018-05-22 VITALS
RESPIRATION RATE: 15 BRPM | TEMPERATURE: 97.5 F | SYSTOLIC BLOOD PRESSURE: 137 MMHG | OXYGEN SATURATION: 100 % | DIASTOLIC BLOOD PRESSURE: 76 MMHG | HEART RATE: 86 BPM

## 2018-05-22 DIAGNOSIS — K92.1: Primary | ICD-10-CM

## 2018-05-22 PROCEDURE — 99281 EMR DPT VST MAYX REQ PHY/QHP: CPT

## 2018-05-22 NOTE — PD
HPI


Chief Complaint:  Bleeding


Time Seen by Provider:  05:01


Travel History


International Travel<30 days:  No


Contact w/Intl Traveler<30days:  No


Traveled to known affect area:  No





History of Present Illness


HPI


The patient is a 43 year old female who presents to the [facility] 

emergency department with a history of bright red blood per rectum that she 

reports began yesterday.  She reports that today she had 3 episodes of bloody 

stool.  She reports that the blood is mixed with the stool.  She reports that 

it is bright red.  She reports that it is present in the water as well as on 

the toilet paper when she wipes.  She reports having rectal pressure, however 

no rectal pain.  She denies having any known hemorrhoids.  She denies having 

any harder than usual stool.  She is unsure whether she has had a colonoscopy 

in the past.  She denies any known prior history of GI bleed.  The patient is 

somewhat confused regarding her past medical history.  The patient reports 

having associated abdominal pain.  She reports that the abdominal pain is in 

the center of her abdomen, however it also moves around.  She reports that the 

pain is "nagging and sharp pain".  She denies having any worsening acid reflux 

or heartburn symptoms.  She reports that she is on medication to treat this.  

She reports having lightheaded sensation since yesterday.  She reports having 

nausea and vomiting 2.  On review of systems, she denies having any known 

recent fevers, cough or congestion, neck pain, chest pain, urinary symptoms, or 

neurologic symptoms.  She reports having some shortness of breath with exertion

, however she does have a history of COPD.





[facility]


Past Medical History


*** Narrative Medical


The patient's past medical history is significant for anxiety and depression, 

COPD, hypertension, history of chronic pain, acid reflux.


Arthritis:  Yes


Bipolar Disorder:  Yes


Anxiety:  Yes


Depression:  Yes


Heart Rhythm Problems:  Yes (tachycardic arrythmia)


Cardiovascular Problems:  Yes


COPD:  Yes


Fibromyalgia:  Yes


Gastrointestinal Disorders:  Yes (bleeding ulcers)


GERD:  Yes


Hypertension:  Yes


Insomnia:  Yes


Neurologic:  Yes


Respiratory:  Yes (copd)


Migraines:  Yes


Seizures:  Yes


Ulcer:  Yes


Pregnant?:  Not Pregnant


:  4


Para:  2


Miscarriage:  2





Past Surgical History


*** Narrative Surgical


The patient's past surgical history is significant for a , hysterectomy

, cervical fusion.


Appendectomy:  Yes


 Section:  Yes


Gynecologic Surgery:  Yes (laproscopic endometriosis repair)


Hysterectomy:  Yes


Neurologic Surgery:  Yes (Cervical Fusionx2)


Other Surgery:  Yes





Social History


Alcohol Use:  No


Tobacco Use:  No


Substance Use:  No





Allergies-Medications


(Allergen,Severity, Reaction):  


Coded Allergies:  


     azithromycin (Verified  Allergy, Severe, 18)


     ondansetron (Unverified  Allergy, Intermediate, VOMITING, 18)


     Sulfa (Sulfonamide Antibiotics) (Unverified  Adverse Reaction, Intermediate

, 18)


     cyclobenzaprine (Unverified  Adverse Reaction, Intermediate, 18)


     erythromycin base (Unverified  Adverse Reaction, Intermediate, 18)


     haloperidol (Unverified  Adverse Reaction, Intermediate, 18)


     propoxyphene (Unverified  Adverse Reaction, Intermediate, 18)


     sumatriptan (Unverified  Adverse Reaction, Intermediate, 18)


Reported Meds & Prescriptions





Reported Meds & Active Scripts


Active


Reported


Prozac (Fluoxetine HCl) 40 Mg Cap 80 Mg PO DAILY


Prilosec (Omeprazole Magnesium) 20 Mg Tab 40 Mg PO BID


Gabapentin 800 Mg Tab 800 Mg PO Q6HR


Atenolol 50 Mg Tab 50 Mg PO BID


Clonidine (Clonidine HCl) 0.2 Mg Tab 0.2 Mg PO TID








Review of Systems


Except as stated in HPI:  all other systems reviewed are Neg


General / Constitutional:  No: Fever


Eyes:  No: Visual changes


HENT:  No: Headaches, Congestion


Cardiovascular:  Positive: Dyspnea on exertion, No: Chest Pain or Discomfort


Respiratory:  Positive: Shortness of Breath, No: Cough


Gastrointestinal:  Positive: Nausea, Vomiting, Abdominal Pain, Hematochezia, 

Changes in Bowel Habits, No: Diarrhea, Hematemesis, Indigestion, Loss of 

Appetite


Genitourinary:  No: Dysuria


Musculoskeletal:  No: Pain


Skin:  No Rash


Neurologic:  No: Weakness, Focal Abnormalities, Change in Mentation, Slurred 

Speech, Sensory Disturbance


Psychiatric:  No: Depression


Endocrine:  No: Polydipsia


Hematologic/Lymphatic:  No: Easy Bruising





Physical Exam


Narrative


General: 


The patient is a well-developed well-nourished female in no acute distress





Head and Neck exam: 


Head is normocephalic atraumatic. 


Eyes: EOMI, pupils are dilated at 6 mm, reactive to light.


Nose: Midline septum with pink mucous membranes 


Mouth: Dentition unremarkable. Moist mucus membranes. Posterior oropharynx is 

not erythematous. No tonsillar hypertrophy. Uvula midline. Airway patent. 


Neck: No palpable lymphadenopathy. No nuchal rigidity. No thyromegaly. 





Cardiovascular: 


Regular rate and rhythm without murmurs, gallops, or rubs. No pulse deficit to 

the extremities on simultaneous auscultation and palpation of her radial 

artery. 





Lungs: 


Clear to auscultation bilaterally. No wheezes, rhonchi, or rales.


 


Abdomen:


Soft, with reported tenderness on palpation in the midepigastric area that she 

reports with palpation radiates down into the suprapubic area.  No tenderness 

on palpation of McBurney's point.  Negative Johnson sign.  Normal bowel sounds 

are audible.  No guarding, rebound, or rigidity. 





Extremities: 


No clubbing, cyanosis, or edema. 2+ pulses in all 4 extremities.  No calf 

tenderness on palpation.





Back: 


No spinous process tenderness to palpation.  The patient reports having left-

sided CVA tenderness on palpation.





Neurologic Exam: Grossly nonfocal.





Skin Exam: No rash noted. Intact skin that is warm and dry.





Data


Data


Last Documented VS





Vital Signs








  Date Time  Temp Pulse Resp B/P (MAP) Pulse Ox O2 Delivery O2 Flow Rate FiO2


 


18 04:49 97.5 86 15 137/76 (96) 100   








Orders





 Orders


Electrocardiogram (18 05:14)


Complete Blood Count With Diff (18 05:14)


Comprehensive Metabolic Panel (18 05:14)


Prothrombin Time / Inr (Pt) (18 05:14)


Act Partial Throm Time (Ptt) (18 05:14)


Lipase (18 05:14)


Urinalysis - C+S If Indicated (18 05:14)


Magnesium (Mg) (18 05:14)


Chest, Single Ap (18 05:14)


Iv Access Insert/Monitor (18 05:14)


Ecg Monitoring (18 05:14)


Oximetry (18 05:14)


Type And Screen (18 05:14)


Drug Screen, Random Urine (18 05:14)


Alcohol (Ethanol) (18 05:14)


Ct Abd/Pel W Iv Contrast(Rout) (18 05:14)








[facility]


Medical Decision Making


Medical Screen Exam Complete:  Yes


Emergency Medical Condition:  Yes


Medical Record Reviewed:  Yes


Differential Diagnosis


Anal fissure, versus hemorrhoid, versus AVM malformation, versus peptic ulcer 

bleed, versus diverticulosis, versus colitis


Narrative Course


During the course of the patient's emergency department visit, the patient's 

history, examination, and differential diagnosis were reviewed with the 

patient. The patient was placed on a cardiac monitor with oximetry and frequent 

blood pressure monitoring. The patient had IV access and laboratory studies 

ordered to be obtained.  I explained to the patient that this would be done and 

I would come back to do a rectal examination with the nurse as a chaperone.





Unfortunately, the patient eloped from the emergency department prior to IV 

access being obtained.  She did not give any explanation as to the reason she 

was leaving.





Diagnosis





 Primary Impression:  


 Bright red blood per rectum


Referrals:  


Primary Care Physician


1 day


Patient Instructions:  General Instructions, Rectal Bleeding (ED)


Disposition:  07 AGAINST MEDICAL ADVICE


Condition:  Tresa Martinez MD May 22, 2018 05:32

## 2018-05-28 ENCOUNTER — HOSPITAL ENCOUNTER (EMERGENCY)
Dept: HOSPITAL 17 - NEPC | Age: 43
Discharge: HOME | End: 2018-05-28
Payer: SELF-PAY

## 2018-05-28 VITALS
HEART RATE: 69 BPM | OXYGEN SATURATION: 100 % | SYSTOLIC BLOOD PRESSURE: 127 MMHG | TEMPERATURE: 98.4 F | RESPIRATION RATE: 20 BRPM | DIASTOLIC BLOOD PRESSURE: 74 MMHG

## 2018-05-28 VITALS
RESPIRATION RATE: 20 BRPM | DIASTOLIC BLOOD PRESSURE: 77 MMHG | HEART RATE: 65 BPM | SYSTOLIC BLOOD PRESSURE: 123 MMHG | OXYGEN SATURATION: 99 %

## 2018-05-28 VITALS
OXYGEN SATURATION: 96 % | RESPIRATION RATE: 22 BRPM | HEART RATE: 71 BPM | DIASTOLIC BLOOD PRESSURE: 63 MMHG | SYSTOLIC BLOOD PRESSURE: 123 MMHG

## 2018-05-28 VITALS — HEIGHT: 62 IN | WEIGHT: 201.5 LBS | BODY MASS INDEX: 37.08 KG/M2

## 2018-05-28 DIAGNOSIS — G47.00: ICD-10-CM

## 2018-05-28 DIAGNOSIS — M79.7: ICD-10-CM

## 2018-05-28 DIAGNOSIS — K21.9: ICD-10-CM

## 2018-05-28 DIAGNOSIS — F31.9: ICD-10-CM

## 2018-05-28 DIAGNOSIS — J44.9: ICD-10-CM

## 2018-05-28 DIAGNOSIS — F41.9: ICD-10-CM

## 2018-05-28 DIAGNOSIS — I10: ICD-10-CM

## 2018-05-28 DIAGNOSIS — K52.9: Primary | ICD-10-CM

## 2018-05-28 DIAGNOSIS — M19.90: ICD-10-CM

## 2018-05-28 LAB
ALBUMIN SERPL-MCNC: 3.7 GM/DL (ref 3.4–5)
ALP SERPL-CCNC: 123 U/L (ref 45–117)
ALT SERPL-CCNC: 20 U/L (ref 10–53)
AST SERPL-CCNC: 34 U/L (ref 15–37)
BACTERIA #/AREA URNS HPF: (no result) /HPF
BASOPHILS # BLD AUTO: 0.1 TH/MM3 (ref 0–0.2)
BASOPHILS NFR BLD: 1.2 % (ref 0–2)
BILIRUB SERPL-MCNC: 0.2 MG/DL (ref 0.2–1)
BUN SERPL-MCNC: 13 MG/DL (ref 7–18)
CALCIUM SERPL-MCNC: 8.7 MG/DL (ref 8.5–10.1)
CHLORIDE SERPL-SCNC: 104 MEQ/L (ref 98–107)
COLOR UR: (no result)
CREAT SERPL-MCNC: 0.77 MG/DL (ref 0.5–1)
EOSINOPHIL # BLD: 0.4 TH/MM3 (ref 0–0.4)
EOSINOPHIL NFR BLD: 7.7 % (ref 0–4)
ERYTHROCYTE [DISTWIDTH] IN BLOOD BY AUTOMATED COUNT: 17.4 % (ref 11.6–17.2)
GFR SERPLBLD BASED ON 1.73 SQ M-ARVRAT: 82 ML/MIN (ref 89–?)
GLUCOSE SERPL-MCNC: 76 MG/DL (ref 74–106)
GLUCOSE UR STRIP-MCNC: (no result) MG/DL
HCO3 BLD-SCNC: 25.3 MEQ/L (ref 21–32)
HCT VFR BLD CALC: 31 % (ref 35–46)
HGB BLD-MCNC: 9.9 GM/DL (ref 11.6–15.3)
HGB UR QL STRIP: (no result)
INR PPP: 1 RATIO
KETONES UR STRIP-MCNC: (no result) MG/DL
LYMPHOCYTES # BLD AUTO: 1.3 TH/MM3 (ref 1–4.8)
LYMPHOCYTES NFR BLD AUTO: 21.8 % (ref 9–44)
MCH RBC QN AUTO: 27.9 PG (ref 27–34)
MCHC RBC AUTO-ENTMCNC: 31.9 % (ref 32–36)
MCV RBC AUTO: 87.2 FL (ref 80–100)
MONOCYTE #: 0.4 TH/MM3 (ref 0–0.9)
MONOCYTES NFR BLD: 6.7 % (ref 0–8)
NEUTROPHILS # BLD AUTO: 3.7 TH/MM3 (ref 1.8–7.7)
NEUTROPHILS NFR BLD AUTO: 62.6 % (ref 16–70)
NITRITE UR QL STRIP: (no result)
PLATELET # BLD: 281 TH/MM3 (ref 150–450)
PMV BLD AUTO: 6.9 FL (ref 7–11)
PROT SERPL-MCNC: 7.2 GM/DL (ref 6.4–8.2)
PROTHROMBIN TIME: 9.9 SEC (ref 9.8–11.6)
RBC # BLD AUTO: 3.55 MIL/MM3 (ref 4–5.3)
SODIUM SERPL-SCNC: 140 MEQ/L (ref 136–145)
SP GR UR STRIP: 1 (ref 1–1.03)
SQUAMOUS #/AREA URNS HPF: 1 /HPF (ref 0–5)
URINE LEUKOCYTE ESTERASE: (no result)
WBC # BLD AUTO: 5.8 TH/MM3 (ref 4–11)

## 2018-05-28 PROCEDURE — 85025 COMPLETE CBC W/AUTO DIFF WBC: CPT

## 2018-05-28 PROCEDURE — 85610 PROTHROMBIN TIME: CPT

## 2018-05-28 PROCEDURE — 83690 ASSAY OF LIPASE: CPT

## 2018-05-28 PROCEDURE — 71045 X-RAY EXAM CHEST 1 VIEW: CPT

## 2018-05-28 PROCEDURE — 74176 CT ABD & PELVIS W/O CONTRAST: CPT

## 2018-05-28 PROCEDURE — 80053 COMPREHEN METABOLIC PANEL: CPT

## 2018-05-28 PROCEDURE — 99284 EMERGENCY DEPT VISIT MOD MDM: CPT

## 2018-05-28 PROCEDURE — 81001 URINALYSIS AUTO W/SCOPE: CPT

## 2018-05-28 PROCEDURE — 96372 THER/PROPH/DIAG INJ SC/IM: CPT

## 2018-05-28 PROCEDURE — 85730 THROMBOPLASTIN TIME PARTIAL: CPT

## 2018-05-28 NOTE — PD
HPI


Chief Complaint:  Abdominal Pain


Time Seen by Provider:  07:04


Travel History


International Travel<30 days:  No


Contact w/Intl Traveler<30days:  No


Traveled to known affect area:  No





History of Present Illness


HPI


Patient is a 43-year-old female presents emergency department for evaluation of 

generalized abdominal pain and bright red blood per rectum which was present 

all last night.  Patient states that 3 days ago she had gallbladder removed at 

[facility] she was visiting there.  She states that she is going to 

follow-up with a surgeon here and was given a card for somebody in town.From [facility]  She states the pain was severe keeping her up all night, not associated 

with any vomiting but was having nausea.  She was given a Percocet according to 

her from [facility] and that supply has been exhausted.  She denies any 

alleviating or exacerbating factors, denies any radiation of her pain states 

cramping in nature and so she was some abdominal swelling.





PFSH


Past Medical History


Arthritis:  Yes


Bipolar Disorder:  Yes


Anxiety:  Yes


Depression:  Yes


Heart Rhythm Problems:  Yes (tachycardic arrythmia)


Cardiovascular Problems:  Yes


COPD:  Yes


Diminished Hearing:  No


Fibromyalgia:  Yes


Gastrointestinal Disorders:  Yes (bleeding ulcers)


GERD:  Yes


Hypertension:  Yes


Immune Disorder:  No


Insomnia:  Yes


Musculoskeletal:  Yes


Neurologic:  Yes


Psychiatric:  Yes


Respiratory:  Yes (copd)


Immunizations Current:  Yes


Migraines:  Yes


Seizures:  Yes


Ulcer:  Yes


Tetanus Vaccination:  < 5 Years


Pregnant?:  Not Pregnant


:  4


Para:  2


Miscarriage:  2





Past Surgical History


Appendectomy:  Yes


 Section:  Yes


Genitourinary Surgery:  Yes (laproscopic endometriosis repair)


Gynecologic Surgery:  Yes (laproscopic endometriosis repair)


Hysterectomy:  Yes


Neurologic Surgery:  Yes (Cervical Fusionx2)


Other Surgery:  Yes





Social History


Alcohol Use:  No


Tobacco Use:  No


Substance Use:  No





Allergies-Medications


(Allergen,Severity, Reaction):  


Coded Allergies:  


     azithromycin (Verified  Allergy, Severe, 18)


     ondansetron (Unverified  Allergy, Intermediate, VOMITING, 18)


     Sulfa (Sulfonamide Antibiotics) (Unverified  Adverse Reaction, Intermediate

, 18)


     cyclobenzaprine (Unverified  Adverse Reaction, Intermediate, 18)


     erythromycin base (Unverified  Adverse Reaction, Intermediate, 18)


     haloperidol (Unverified  Adverse Reaction, Intermediate, 18)


     propoxyphene (Unverified  Adverse Reaction, Intermediate, 18)


     sumatriptan (Unverified  Adverse Reaction, Intermediate, 18)


Reported Meds & Prescriptions





Reported Meds & Active Scripts


Active


Flagyl (Metronidazole) 500 Mg Tab 500 Mg PO BID 7 Days


Ciprofloxacin (Ciprofloxacin HCl) 500 Mg Tab 500 Mg PO BID 7 Days


Phenergan (Promethazine HCl) 25 Mg Tablet 25 Mg PO Q6H PRN


Bentyl (Dicyclomine HCl) 10 Mg Cap 10 Mg PO TID PRN


Reported


Fioricet (Butalbital-Acetaminophen-Caffeine) -40 Mg Cap 1 Cap PO Q4H PRN


Prozac (Fluoxetine HCl) 40 Mg Cap 80 Mg PO DAILY


Prilosec (Omeprazole Magnesium) 20 Mg Tab 40 Mg PO BID


Gabapentin 800 Mg Tab 800 Mg PO Q6HR


Atenolol 50 Mg Tab 50 Mg PO BID


Clonidine (Clonidine HCl) 0.2 Mg Tab 0.2 Mg PO TID








Review of Systems


Except as stated in HPI:  all other systems reviewed are Neg





Physical Exam


Narrative


GENERAL: WD/WN obese female lying in a stretcher.  Wearing a fair amount of 

makeup.


SKIN: Focused skin assessment warm/dry.


HEAD: Atraumatic. Normocephalic. 


EYES: Pupils equal and round. No scleral icterus. No injection or drainage. 


ENT: No nasal bleeding or discharge.  Mucous membranes pink and moist.


NECK: Trachea midline. No JVD. 


CARDIOVASCULAR: Regular rate and rhythm.  No murmur appreciated.


RESPIRATORY: No accessory muscle use. Clear to auscultation. Breath sounds 

equal bilaterally. 


GASTROINTESTINAL: Abdomen soft, no rebound no percussive tenderness, patient 

has nonreproducible tenderness in all 4 quadrants but on repeat palpating each 

quadrant she has intermittent response to the palpation.  Her laparoscopy scars 

are clean dry and intact, no signs of infection., nondistended. Hepatic and 

splenic margins not palpable. 


MUSCULOSKELETAL: No obvious deformities. No clubbing.  No cyanosis.  No edema.  

There is a step-off in the high thoracic region and a surgical scar here.  

Patient states is chronic for her.


NEUROLOGICAL: Awake and alert. No obvious cranial nerve deficits.  Motor 

grossly within normal limits. Normal speech.


PSYCHIATRIC: Appropriate mood and affect; insight and judgment normal.





Data


Data


Last Documented VS





Vital Signs








  Date Time  Temp Pulse Resp B/P (MAP) Pulse Ox O2 Delivery O2 Flow Rate FiO2


 


18 11:32        


 


18 11:14  71 22  96 Room Air  


 


18 06:36 98.4       








Orders





 Orders


Complete Blood Count With Diff (18 07:13)


Comprehensive Metabolic Panel (18 07:13)


Lipase (18 07:13)


Prothrombin Time / Inr (Pt) (18 07:13)


Act Partial Throm Time (Ptt) (18 07:13)


Urinalysis - C+S If Indicated (18 07:13)


Iv Access Insert/Monitor (18 07:13)


Ecg Monitoring (18 07:13)


Oximetry (18 07:13)


Sodium Chloride 0.9% Flush (Ns Flush) (18 07:15)


Oxycodone-Acetamin 5-325 Mg (Percocet (18 07:30)


Promethazine Inj (Phenergan Inj) (18 07:30)


Chest, Single Ap (18 )


Ct Abd/Pel W/O Iv Contrast (18 )


Ed Discharge Order (18 10:50)





Labs





Laboratory Tests








Test


  18


07:40 18


09:40


 


White Blood Count 5.8 TH/MM3  


 


Red Blood Count 3.55 MIL/MM3  


 


Hemoglobin 9.9 GM/DL  


 


Hematocrit 31.0 %  


 


Mean Corpuscular Volume 87.2 FL  


 


Mean Corpuscular Hemoglobin 27.9 PG  


 


Mean Corpuscular Hemoglobin


Concent 31.9 % 


  


 


 


Red Cell Distribution Width 17.4 %  


 


Platelet Count 281 TH/MM3  


 


Mean Platelet Volume 6.9 FL  


 


Neutrophils (%) (Auto) 62.6 %  


 


Lymphocytes (%) (Auto) 21.8 %  


 


Monocytes (%) (Auto) 6.7 %  


 


Eosinophils (%) (Auto) 7.7 %  


 


Basophils (%) (Auto) 1.2 %  


 


Neutrophils # (Auto) 3.7 TH/MM3  


 


Lymphocytes # (Auto) 1.3 TH/MM3  


 


Monocytes # (Auto) 0.4 TH/MM3  


 


Eosinophils # (Auto) 0.4 TH/MM3  


 


Basophils # (Auto) 0.1 TH/MM3  


 


CBC Comment DIFF FINAL  


 


Differential Comment   


 


Prothrombin Time 9.9 SEC  


 


Prothromb Time International


Ratio 1.0 RATIO 


  


 


 


Activated Partial


Thromboplast Time 23.9 SEC 


  


 


 


Blood Urea Nitrogen 13 MG/DL  


 


Creatinine 0.77 MG/DL  


 


Random Glucose 76 MG/DL  


 


Total Protein 7.2 GM/DL  


 


Albumin 3.7 GM/DL  


 


Calcium Level 8.7 MG/DL  


 


Alkaline Phosphatase 123 U/L  


 


Aspartate Amino Transf


(AST/SGOT) 34 U/L 


  


 


 


Alanine Aminotransferase


(ALT/SGPT) 20 U/L 


  


 


 


Total Bilirubin 0.2 MG/DL  


 


Sodium Level 140 MEQ/L  


 


Potassium Level 3.4 MEQ/L  


 


Chloride Level 104 MEQ/L  


 


Carbon Dioxide Level 25.3 MEQ/L  


 


Anion Gap 11 MEQ/L  


 


Estimat Glomerular Filtration


Rate 82 ML/MIN 


  


 


 


Lipase 68 U/L  


 


Urine Color  LIGHT-YELLOW 


 


Urine Turbidity  CLEAR 


 


Urine pH  6.0 


 


Urine Specific Gravity  1.005 


 


Urine Protein  NEG mg/dL 


 


Urine Glucose (UA)  NEG mg/dL 


 


Urine Ketones  NEG mg/dL 


 


Urine Occult Blood  NEG 


 


Urine Nitrite  NEG 


 


Urine Bilirubin  NEG 


 


Urine Urobilinogen


  


  LESS THAN 2.0


MG/DL


 


Urine Leukocyte Esterase  TRACE 


 


Urine RBC  1 /hpf 


 


Urine WBC  2 /hpf 


 


Urine Squamous Epithelial


Cells 


  1 /hpf 


 


 


Urine Bacteria  RARE /hpf 


 


Microscopic Urinalysis Comment


  


  CULT NOT


INDICATED











MDM


Medical Decision Making


Medical Screen Exam Complete:  Yes


Emergency Medical Condition:  Yes


Differential Diagnosis


Colitis, liver abscess, diverticulitis, postoperative infection seems unlikely, 

acute abdomen seems highly unlikely, hemoperitoneum seems unlikely peer


Narrative Course


Patient room to the emergency department, she is postoperative day #3 from 

[facility].  Her abdomen is minimally tender 

throughout all 4 quadrants but there is no peritoneal signs no rebound no 

percussive tenderness.  Her labs are reassuring, CT of her abdomen was 

performed without IV contrast as the patient had IV infiltrate on CT scanner is 

very unlikely to have additional Axis started without invasive methods.  CT 

abdomen without IV contrast does show some left-sided colitis which is mild 

with no complication.  My reassessment of the patient she is sleeping soundly 

and on arousal she states that she is still in pain and requests additional 

pain medication.  At this time I am disinclined to honor her request for 

additional narcotic pain medicine she was offered Tylenol ibuprofen and 

declined.  She was counseled on her diagnosis and urged to follow-up with her 

primary care physician and her surgeon.  She states she is going to be followed 

by a surgeon here in town and I told her to follow-up by phone tomorrow.  

Discussed return to ED criteria symptomatic management.  She is stable for 

Discharge





Diagnosis





 Primary Impression:  


 Colitis


***Med/Other Pt SpecificInfo:  Prescription(s) given


Scripts


Metronidazole (Flagyl) 500 Mg Tab


500 MG PO BID for Infection for 7 Days, #14 TAB 0 Refills


   Prov: Octavio Tapia MD         18 


Ciprofloxacin (Ciprofloxacin) 500 Mg Tab


500 MG PO BID for Infection for 7 Days, #14 TAB 0 Refills


   Prov: Octavio Tapia MD         18 


Promethazine (Phenergan) 25 Mg Tablet


25 MG PO Q6H Y for NAUSEA OR VOMITING, #12 TAB 0 Refills


   Prov: Octavio Tapia MD         18 


Dicyclomine (Bentyl) 10 Mg Cap


10 MG PO TID Y for ABDOMINAL CRAMPING, #20 CAP 0 Refills


   Prov: Octavio Tapia MD         18


Disposition:  01 DISCHARGE HOME


Condition:  Stable











Octavio Tapia MD May 28, 2018 07:32

## 2018-05-28 NOTE — RADRPT
EXAM DATE:  5/28/2018 10:09 AM EDT

AGE/SEX:        43 years / Female



INDICATIONS:  Abdominal pain with bloody stool.



CLINICAL DATA:  This is the patient's initial encounter. Patient reports that signs and symptoms have
 been present for 1 day and indicates a pain score of 3/10. 

                                                                          

MEDICAL/SURGICAL HISTORY:       Cardiovascular disease.  Hypertension. endometriosis  Appendectomy.  
Hysterectomy.



RADIATION DOSE:  9.96 CTDI (mGy)









COMPARISON:      [facility] [place], [place] ABDOMEN & PELVIS W/O CONTRAST, 2/27/2018.  .



TECHNIQUE:  Multiple contiguous axial images were obtained through the abdomen. Images were obtained 
using multiple row detector helical technique. Using dose reduction techniques, radiation dose was ke
pt as low as reasonably achievable to obtain optimal diagnostic quality images. 



FINDINGS: 

Lower Lungs: The visualized lower lungs are clear. Questionable lesion in the outer left breast.

Liver: The liver has a homogeneous density without space-occupying lesion. There is no dilation of th
e biliary tree. Cholecystectomy.

Spleen:  Homogeneous density without enlargement.

Pancreas:  Unremarkable without mass or calcification.

Kidneys:  Normal in size and shape. No evidence of mass or hydronephrosis.

Adrenal Glands:   Unremarkable.

Aorta:   The aorta and proximal iliac vessels are grossly unremarkable without aneurysmal dilation.

Bowel/Mesentery:  Scattered areas of wall thickening transverse colon and splenic flexure. Appendecto
my.

Abdominal Wall:  Intact.

Retroperitoneum:  No evidence of adenopathy in the retrocrural, para-aortic, or deep pelvic regions.

Bladder:  Contours are smooth.

Reproductive Organs:  No abnormal masses or calcifications seen. Uterus is absent.

Inguinal:  The inguinal region is unremarkable without evidence of adenopathy.

Bony Structures:  Unremarkable.



CONCLUSION:

1.  Scattered areas of wall thickening within the transverse colon and splenic flexure, could be coli
tis. No perforation or abscess.

2.  Status post cholecystectomy and appendectomy.

3.  Status post hysterectomy.



Electronically signed by: Jarett Singh MD  5/28/2018 10:14 AM EDT

## 2018-05-28 NOTE — RADRPT
EXAM DATE:  5/28/2018 7:51 AM EDT

AGE/SEX:        43 years / Female



INDICATIONS:  Chest pain since Friday.



CLINICAL DATA:  This is the patient's initial encounter. Patient reports that signs and symptoms have
 been present for 3 days and indicates a pain score of 6/10. 

                                                                          

MEDICAL/SURGICAL HISTORY:       Hypertension. Cholecystectomy.



[place]      [facility] [place]  .



FINDINGS:  

A single AP view of the chest demonstrates the lungs to be symmetrically aerated without evidence of 
mass, infiltrate or effusion. Mild cardiomegaly. The cardiomediastinal contours are unremarkable.  Os
seous structures are intact.  





CONCLUSION: 

No acute cardiopulmonary disease



Electronically signed by: Jarett Singh MD  5/28/2018 8:15 AM EDT

## 2018-05-30 ENCOUNTER — HOSPITAL ENCOUNTER (EMERGENCY)
Dept: HOSPITAL 17 - NEPC | Age: 43
LOS: 1 days | Discharge: HOME | End: 2018-05-31
Payer: SELF-PAY

## 2018-05-30 VITALS — OXYGEN SATURATION: 98 % | RESPIRATION RATE: 16 BRPM | HEART RATE: 60 BPM

## 2018-05-30 VITALS
RESPIRATION RATE: 16 BRPM | HEART RATE: 59 BPM | OXYGEN SATURATION: 100 % | TEMPERATURE: 97.3 F | DIASTOLIC BLOOD PRESSURE: 59 MMHG | SYSTOLIC BLOOD PRESSURE: 116 MMHG

## 2018-05-30 VITALS — HEIGHT: 64 IN | WEIGHT: 187.39 LBS | BODY MASS INDEX: 31.99 KG/M2

## 2018-05-30 DIAGNOSIS — K52.9: Primary | ICD-10-CM

## 2018-05-30 DIAGNOSIS — E86.0: ICD-10-CM

## 2018-05-30 LAB
BACTERIA #/AREA URNS HPF: (no result) /HPF
COLOR UR: YELLOW
ERYTHROCYTE [DISTWIDTH] IN BLOOD BY AUTOMATED COUNT: 17.7 % (ref 11.6–17.2)
GLUCOSE UR STRIP-MCNC: (no result) MG/DL
HCT VFR BLD CALC: 34.5 % (ref 35–46)
HGB BLD-MCNC: 11.1 GM/DL (ref 11.6–15.3)
HGB UR QL STRIP: (no result)
INR PPP: 1 RATIO
KETONES UR STRIP-MCNC: (no result) MG/DL
MCH RBC QN AUTO: 28.2 PG (ref 27–34)
MCHC RBC AUTO-ENTMCNC: 32.2 % (ref 32–36)
MCV RBC AUTO: 87.7 FL (ref 80–100)
NITRITE UR QL STRIP: (no result)
PLATELET # BLD: 277 TH/MM3 (ref 150–450)
PMV BLD AUTO: 8.2 FL (ref 7–11)
PROTHROMBIN TIME: 10.1 SEC (ref 9.8–11.6)
RBC # BLD AUTO: 3.94 MIL/MM3 (ref 4–5.3)
SP GR UR STRIP: 1.01 (ref 1–1.03)
SQUAMOUS #/AREA URNS HPF: 5 /HPF (ref 0–5)
URINE LEUKOCYTE ESTERASE: (no result)
WBC # BLD AUTO: 8 TH/MM3 (ref 4–11)

## 2018-05-30 PROCEDURE — 96375 TX/PRO/DX INJ NEW DRUG ADDON: CPT

## 2018-05-30 PROCEDURE — 83605 ASSAY OF LACTIC ACID: CPT

## 2018-05-30 PROCEDURE — 96374 THER/PROPH/DIAG INJ IV PUSH: CPT

## 2018-05-30 PROCEDURE — 99284 EMERGENCY DEPT VISIT MOD MDM: CPT

## 2018-05-30 PROCEDURE — 85730 THROMBOPLASTIN TIME PARTIAL: CPT

## 2018-05-30 PROCEDURE — 83690 ASSAY OF LIPASE: CPT

## 2018-05-30 PROCEDURE — 81001 URINALYSIS AUTO W/SCOPE: CPT

## 2018-05-30 PROCEDURE — 74177 CT ABD & PELVIS W/CONTRAST: CPT

## 2018-05-30 PROCEDURE — 85007 BL SMEAR W/DIFF WBC COUNT: CPT

## 2018-05-30 PROCEDURE — 85610 PROTHROMBIN TIME: CPT

## 2018-05-30 PROCEDURE — 87015 SPECIMEN INFECT AGNT CONCNTJ: CPT

## 2018-05-30 PROCEDURE — 96361 HYDRATE IV INFUSION ADD-ON: CPT

## 2018-05-30 PROCEDURE — 85027 COMPLETE CBC AUTOMATED: CPT

## 2018-05-30 PROCEDURE — 80053 COMPREHEN METABOLIC PANEL: CPT

## 2018-05-30 NOTE — PD
HPI


Chief Complaint:  Medical Clearance


Time Seen by Provider:  22:07


Travel History


International Travel<30 days:  No


Contact w/Intl Traveler<30days:  No


Traveled to known affect area:  No





History of Present Illness


HPI


43-year-old  female presented to the emergency department with ongoing 

abdominal pain, nausea, vomiting, and diarrhea.  Patient states she had her 

gallbladder out laparoscopically a [facility] last Friday, and is since 

continue to have pain, nausea, vomiting, and frequent diarrhea.  She states the 

diarrhea is watery up to 5-6 times daily and sometimes green.  Patient states 

she is currently not on antibiotics.  She states decreased urine output and 

feels dizzy and weak.  She denies fever.  She has had chills.  She denies blood 

in her vomitus or diarrhea.  She has generalized abdominal pain and bloating.  

Patient has multiple allergies including sulfa, azithromycin, cyclobenzaprine, 

erythromycin, Haldol, Zofran, propoxyphene and sumatriptan.





PFSH


Past Medical History


Arthritis:  Yes


Bipolar Disorder:  Yes


Anxiety:  Yes


Depression:  Yes


Heart Rhythm Problems:  Yes (tachycardic arrythmia)


Cardiovascular Problems:  Yes


COPD:  Yes


Diminished Hearing:  No


Fibromyalgia:  Yes


Gastrointestinal Disorders:  Yes (bleeding ulcers)


GERD:  Yes


Hypertension:  Yes


Immune Disorder:  No


Insomnia:  Yes


Musculoskeletal:  Yes


Neurologic:  Yes


Psychiatric:  Yes


Respiratory:  Yes (copd)


Immunizations Current:  Yes


Migraines:  Yes


Seizures:  Yes


Ulcer:  Yes


:  4


Para:  2


Miscarriage:  2





Past Surgical History


Appendectomy:  Yes


 Section:  Yes


Genitourinary Surgery:  Yes (laproscopic endometriosis repair)


Gynecologic Surgery:  Yes (laproscopic endometriosis repair)


Hysterectomy:  Yes


Neurologic Surgery:  Yes (Cervical Fusionx2)


Other Surgery:  Yes





Social History


Alcohol Use:  No


Tobacco Use:  No


Substance Use:  No





Allergies-Medications


(Allergen,Severity, Reaction):  


Coded Allergies:  


     azithromycin (Verified  Allergy, Severe, 18)


     ondansetron (Unverified  Allergy, Intermediate, VOMITING, 18)


     Sulfa (Sulfonamide Antibiotics) (Unverified  Adverse Reaction, Intermediate

, 18)


     cyclobenzaprine (Unverified  Adverse Reaction, Intermediate, 18)


     erythromycin base (Unverified  Adverse Reaction, Intermediate, 18)


     haloperidol (Unverified  Adverse Reaction, Intermediate, 18)


     propoxyphene (Unverified  Adverse Reaction, Intermediate, 18)


     sumatriptan (Unverified  Adverse Reaction, Intermediate, 18)


Reported Meds & Prescriptions





Reported Meds & Active Scripts


Active


Flagyl (Metronidazole) 500 Mg Tab 500 Mg PO BID 7 Days


Ciprofloxacin (Ciprofloxacin HCl) 500 Mg Tab 500 Mg PO BID 7 Days


Phenergan (Promethazine HCl) 25 Mg Tablet 25 Mg PO Q6H PRN


Bentyl (Dicyclomine HCl) 10 Mg Cap 10 Mg PO TID PRN


Reported


Fioricet (Butalbital-Acetaminophen-Caffeine) -40 Mg Cap 1 Cap PO Q4H PRN


Prozac (Fluoxetine HCl) 40 Mg Cap 80 Mg PO DAILY


Prilosec (Omeprazole Magnesium) 20 Mg Tab 40 Mg PO BID


Gabapentin 800 Mg Tab 800 Mg PO Q6HR


Atenolol 50 Mg Tab 50 Mg PO BID


Clonidine (Clonidine HCl) 0.2 Mg Tab 0.2 Mg PO TID








Physical Exam


Narrative


GENERAL: Patient appears in mild to moderate distress 


SKIN: Warm and dry.  Normal color.  Normal turgor.  Patient does have some mild 

dehiscence of her anterior abdominal laparoscopic incision site.  There is no 

signs of deep abscess or significant wound dehiscence.


HEAD: Atraumatic. Normocephalic. 


EYES: Pupils equal and round. No scleral icterus. No injection or drainage. 


ENT: No nasal bleeding or discharge.  Mucous membranes pink and moist.  Pharynx 

is clear.  Airways patent


NECK: Trachea midline. No JVD. 


CARDIOVASCULAR: Regular rate and rhythm.  


RESPIRATORY: No accessory muscle use. Clear to auscultation. Breath sounds 

equal bilaterally. 


GASTROINTESTINAL: Abdomen soft, moderately diffusely tender, nondistended.  

Decreased bowel sounds throughout.  No point tenderness or rebound.  No CVA 

tenderness.  Hepatic and splenic margins not palpable. 


MUSCULOSKELETAL: Extremities without clubbing, cyanosis, or edema. No obvious 

deformities. 


NEUROLOGICAL: Awake and alert. No obvious cranial nerve deficits.  Motor 

grossly within normal limits. Five out of 5 muscle strength in the arms and 

legs.  Normal speech.


PSYCHIATRIC: Appropriate mood and affect; insight and judgment normal.





Data


Data


Last Documented VS





Vital Signs








  Date Time  Temp Pulse Resp B/P (MAP) Pulse Ox O2 Delivery O2 Flow Rate FiO2


 


18 21:43 97.3 59 16 116/59 (78) 100   








Orders





 Orders


Complete Blood Count With Diff (18 22:13)


Comprehensive Metabolic Panel (18 22:13)


Lipase (18 22:13)


Lactic Acid (18 22:13)


Prothrombin Time / Inr (Pt) (18 22:13)


Act Partial Throm Time (Ptt) (18 22:13)


Urinalysis - C+S If Indicated (18 22:13)


Ct Abd/Pel W Iv Contrast(Rout) (18 22:13)


Iv Access Insert/Monitor (18 22:13)


Ecg Monitoring (18:13)


Oximetry (18:13)


NPO (18:13)


Morphine Inj (Morphine Inj) (18 22:15)


Sodium Chlor 0.9% 1000 Ml Inj (Ns 1000 M (18 22:13)


Sodium Chloride 0.9% Flush (Ns Flush) (18 22:15)


C Diff Toxin Pcr (18 22:13)


Stool [employer] Culture And Stain (18 22:13)


Prochlorperazine Inj (Compazine Inj) (18 22:30)








MDM


Medical Decision Making


Medical Screen Exam Complete:  Yes


Emergency Medical Condition:  Yes


Medical Record Reviewed:  Yes


Differential Diagnosis


Recent laparoscopic gallbladder removal, nausea and vomiting.  Abdominal pain.  

Watery diarrhea.  Possible C. difficile.


Narrative Course


Patient appears weak but otherwise medically stable.


Vital signs are stable.


Labs ordered including:


CBC, CMP, lactic acid, lipase, coagulation studies, stool culture, and C. 

difficile PCR.  Urinalysis is ordered as well.


IV access is obtained the patient is given 2 mg morphine IV, 5 mg Compazine IV, 

and 1000 mL of normal saline bolus.


CT of the abdomen and pelvis with IV contrast is ordered.


All studies are pending at 2300 hrs.  Patient is discussed with Dr. Knox who 

takes over care of the patient and will determine final disposition.


Condition:  Stable











Scott Phillips May 30, 2018 22:17

## 2018-05-31 LAB
ALBUMIN SERPL-MCNC: 3.5 GM/DL (ref 3.4–5)
ALP SERPL-CCNC: 127 U/L (ref 45–117)
ALT SERPL-CCNC: 16 U/L (ref 10–53)
AST SERPL-CCNC: 20 U/L (ref 15–37)
BASOPHILS NFR BLD AUTO: 1 % (ref 0–2)
BILIRUB SERPL-MCNC: 0.2 MG/DL (ref 0.2–1)
BUN SERPL-MCNC: 8 MG/DL (ref 7–18)
CALCIUM SERPL-MCNC: 8.6 MG/DL (ref 8.5–10.1)
CHLORIDE SERPL-SCNC: 113 MEQ/L (ref 98–107)
CREAT SERPL-MCNC: 1.02 MG/DL (ref 0.5–1)
GFR SERPLBLD BASED ON 1.73 SQ M-ARVRAT: 59 ML/MIN (ref 89–?)
GLUCOSE SERPL-MCNC: 95 MG/DL (ref 74–106)
HCO3 BLD-SCNC: 16.5 MEQ/L (ref 21–32)
LYMPHOCYTES: 29 % (ref 9–44)
MONOCYTES: 2 % (ref 0–8)
NEUTS BAND # BLD MANUAL: 4.2 TH/MM3 (ref 1.8–7.7)
NEUTS BAND NFR BLD: 1 % (ref 0–6)
NEUTS SEG NFR BLD MANUAL: 52 % (ref 16–70)
OVALOCYTES BLD QL SMEAR: (no result)
PROT SERPL-MCNC: 7 GM/DL (ref 6.4–8.2)
SODIUM SERPL-SCNC: 141 MEQ/L (ref 136–145)

## 2018-05-31 NOTE — RADRPT
EXAM DATE:  2018 12:33 AM EDT

AGE/SEX:        43 years / Female



INDICATIONS:  Abdominal pain and diarrhea post cholecystectomy Friday the .



CLINICAL DATA:  This is the patient's initial encounter. Patient reports that signs and symptoms have
 been present for 4 - 6 days and indicates a pain score of 8/10. 

                                                                          

MEDICAL/SURGICAL HISTORY:       Hypertension.  Chronic obstructive pulmonary disease.  Ulcers.  GERD.
 Fibromyalgia.   Hysterectomy.  Appendectomy.  Cholecystectomy.  . 



ORAL CONTRAST:   No oral contrast ingested.



RADIATION DOSE:  14.93 CTDI (mGy)









[place]      [facility] [place] 2016.  .



TECHNIQUE:  Multiple contiguous axial images were obtained through the abdomen and pelvis following b
olus infusion of  95 ml Omnipaque 350 (iohexol)  nonionic water-soluble contrast as a single exam dos
e. No oral contrast ingested.  Using automated exposure control and adjustment of the mA and/or kV ac
cording to patient size, the radiation dose was kept as low as reasonably achievable to obtain optima
l diagnostic quality images.



FINDINGS: 

Abdomen CT:

The liver, spleen, pancreas, kidneys, adrenals are unremarkable. There is no evidence for any appreci
able pathological adenopathy, free fluid, or bowel obstruction.  There is evidence for prior cholecys
tectomy. There are prominent vessels in the splenic hilum and is an area of somewhat rounded in appea
riley measures 1.4 cm in size probably a splenic artery aneurysm.



Pelvic CT:

There is no evidence for mass, abscess formation, or any significant adenopathy within the pelvis.  T
here is moderate amount of stool throughout the colon.



CONCLUSION:

1.  Small splenic artery aneurysm present on the prior study from 2016 not significantly changed.

2.  Moderate amount of stool throughout the colon and otherwise unremarkable.



Electronically signed by: MACK Kulkarni MD  2018 12:45 AM EDT

## 2018-05-31 NOTE — PD
Physical Exam


Date Seen by Provider:  May 31, 2018


Time Seen by Provider:  00:04


Narrative


GENERAL: Well-developed well-nourished female no acute distress no respiratory 

distress with slurred speech


SKIN: Warm and dry.


HEAD: Normocephalic.


EYES: No scleral icterus. No injection or drainage. 


NECK: Supple, trachea midline. No JVD or lymphadenopathy.


CARDIOVASCULAR: Regular rate and rhythm without murmurs, gallops, or rubs. 


RESPIRATORY: Breath sounds equal bilaterally. No accessory muscle use.


GASTROINTESTINAL: Abdomen soft, mildly diffusely tender, nondistended. 








Data


Data


Last Documented VS





Vital Signs








  Date Time  Temp Pulse Resp B/P (MAP) Pulse Ox O2 Delivery O2 Flow Rate FiO2


 


5/30/18 23:44  60 16  98 Room Air  


 


5/30/18 21:43 97.3   116/59 (78)    








Orders





 Orders


Complete Blood Count With Diff (5/30/18 22:13)


Comprehensive Metabolic Panel (5/30/18 22:13)


Lipase (5/30/18 22:13)


Lactic Acid (5/30/18 22:13)


Prothrombin Time / Inr (Pt) (5/30/18 22:13)


Act Partial Throm Time (Ptt) (5/30/18 22:13)


Urinalysis - C+S If Indicated (5/30/18 22:13)


Iv Access Insert/Monitor (5/30/18 22:13)


Ecg Monitoring (5/30/18 22:13)


Oximetry (5/30/18 22:13)


NPO (5/30/18 22:13)


Morphine Inj (Morphine Inj) (5/30/18 22:15)


Sodium Chlor 0.9% 1000 Ml Inj (Ns 1000 M (5/30/18 22:13)


Sodium Chloride 0.9% Flush (Ns Flush) (5/30/18 22:15)


C Diff Toxin Pcr (5/30/18 22:13)


Stool Afb Culture And Stain (5/30/18 22:13)


Prochlorperazine Inj (Compazine Inj) (5/30/18 22:30)


Ct Abd/Pel W Iv Contrast(Rout) (5/31/18 22:13)


Iohexol 350 Inj (Omnipaque 350 Inj) (5/31/18 00:30)


Metronidazole 500 Mg Inj (Flagyl 500 Mg (5/31/18 02:30)


Ed Discharge Order (5/31/18 02:44)





Labs





Laboratory Tests








Test


  5/30/18


22:45 5/30/18


23:20 5/30/18


23:30


 


Urine Color YELLOW   


 


Urine Turbidity CLEAR   


 


Urine pH 6.0   


 


Urine Specific Gravity 1.015   


 


Urine Protein NEG mg/dL   


 


Urine Glucose (UA) NEG mg/dL   


 


Urine Ketones NEG mg/dL   


 


Urine Occult Blood NEG   


 


Urine Nitrite NEG   


 


Urine Bilirubin NEG   


 


Urine Urobilinogen


  LESS THAN 2.0


MG/DL 


  


 


 


Urine Leukocyte Esterase NEG   


 


Urine RBC


  LESS THAN 1


/hpf 


  


 


 


Urine WBC 1 /hpf   


 


Urine Squamous Epithelial


Cells 5 /hpf 


  


  


 


 


Urine Bacteria RARE /hpf   


 


Microscopic Urinalysis Comment


  CULT NOT


INDICATED 


  


 


 


White Blood Count  8.0 TH/MM3  


 


Red Blood Count  3.94 MIL/MM3  


 


Hemoglobin  11.1 GM/DL  


 


Hematocrit  34.5 %  


 


Mean Corpuscular Volume  87.7 FL  


 


Mean Corpuscular Hemoglobin  28.2 PG  


 


Mean Corpuscular Hemoglobin


Concent 


  32.2 % 


  


 


 


Red Cell Distribution Width  17.7 %  


 


Platelet Count  277 TH/MM3  


 


Mean Platelet Volume  8.2 FL  


 


CBC Comment  AUTO DIFF  


 


Differential Total Cells


Counted 


  100 


  


 


 


Neutrophils % (Manual)  52 %  


 


Band Neutrophils %  1 %  


 


Lymphocytes %  29 %  


 


Monocytes %  2 %  


 


Eosinophils %  15 %  


 


Basophils %  1 %  


 


Neutrophils # (Manual)  4.2 TH/MM3  


 


Differential Comment


  


  FINAL DIFF


MANUAL 


 


 


Platelet Estimate  NORMAL  


 


Platelet Morphology Comment  NORMAL  


 


Ovalocytes  2+  


 


Blood Urea Nitrogen  8 MG/DL  


 


Creatinine  1.02 MG/DL  


 


Random Glucose  95 MG/DL  


 


Total Protein  7.0 GM/DL  


 


Albumin  3.5 GM/DL  


 


Calcium Level  8.6 MG/DL  


 


Alkaline Phosphatase  127 U/L  


 


Aspartate Amino Transf


(AST/SGOT) 


  20 U/L 


  


 


 


Alanine Aminotransferase


(ALT/SGPT) 


  16 U/L 


  


 


 


Total Bilirubin  0.2 MG/DL  


 


Sodium Level  141 MEQ/L  


 


Potassium Level  3.9 MEQ/L  


 


Chloride Level  113 MEQ/L  


 


Carbon Dioxide Level  16.5 MEQ/L  


 


Anion Gap  12 MEQ/L  


 


Estimat Glomerular Filtration


Rate 


  59 ML/MIN 


  


 


 


Lipase  119 U/L  


 


Prothrombin Time   10.1 SEC 


 


Prothromb Time International


Ratio 


  


  1.0 RATIO 


 


 


Activated Partial


Thromboplast Time 


  


  25.0 SEC 


 


 


Lactic Acid Level   0.9 mmol/L 











[facility]


Medical Record Reviewed:  Yes


Supervised Visit with EDEL:  Yes


Interpretation(s)





Last Impressions








Abdomen/Pelvis CT 5/31/18 2216 Signed





Impressions: 





 CONCLUSION:





 1.  Small splenic artery aneurysm present on the prior study from 2016 not sign





 ificantly changed.





 2.  Moderate amount of stool throughout the colon and otherwise unremarkable.





  





 





CBC & BMP Diagram


5/30/18 23:20








Total Protein 7.0, Albumin 3.5, Calcium Level 8.6, Alkaline Phosphatase 127 H, 

Aspartate Amino Transf (AST/SGOT) 20, Alanine Aminotransferase (ALT/SGPT) 16, 

Total Bilirubin 0.2








Vital Signs








  Date Time  Temp Pulse Resp B/P (MAP) Pulse Ox O2 Delivery O2 Flow Rate FiO2


 


5/30/18 23:44  60 16  98 Room Air  


 


5/30/18 21:43 97.3 59 16 116/59 (78) 100   








Differential Diagnosis


Gastroenteritis, partial small bowel obstruction, ileus, colitis, 

pseudomembranous colitis/C. difficile, dehydration, electrolyte disturbance, UTI


Narrative Course


I, Dr. Knox, have reviewed the advance practice practitioner's documentation 

and am in agreement, met with the patient face to face, made the diagnosis, and 

the medical decision making was done by me.  





*My assessment and Findings: 43-year-old female presents to the emergency 

department for complaint of nausea vomiting diarrhea abdominal pain subjective 

fever since 5/28/18.  Patient underwent cholecystectomy 1 week ago at [facility] while she was visiting the area.  Patient was discharged with 

prescription for Percocet and told to follow-up with a local surgeon when she 

returned back to her home area.  Patient noted that she was having abdominal 

pain with nausea vomiting and diarrhea reportedly had some bloody stools so was 

seen in the emergency department 5/28/18.  Patient was identified by CAT scan 

to have colitis.  Patient was given prescription for Phenergan Cipro and 

Flagyl.  Patient states the medications made her feel sick at her stomach after 

first dose that she took no further Cipro or Flagyl.  Patient states Phenergan 

did not control her nausea or vomiting.  Due to persistent symptoms she decided 

to come to the emergency room for reevaluation.  Patient states she has not 

taken any Percocet since before the 28th and she was seen on the emergency 

department.  Patient here has already received 2 mg of morphine IV and appears 

somewhat drowsy with some slurring of speech but is requesting more narcotic 

pain medication.  Patient's exam is otherwise remarkable for mild tenderness to 

palpation of the abdomen.  Patient has Steri-Strips in place to the right upper 

quadrant and midline incision site is closed with mild erythema but no Steri-

Strips and no drainage no induration is identified.  Patient with recent 

diagnosis of colitis not on medication with ongoing nausea vomiting and 

diarrhea without report of bloody mucoid stool concerning for persistent 

colitis also to be concerned for pseudomembranous colitis/C. difficile after IV 

antibiotics for surgery and then Cipro Flagyl dose on 5/28/18 stool specimen 

has not yet been collected also for evaluation for dehydration electrolyte 

disturbance and also to consider ileus/partial small bowel obstruction 

gastroenteritis and sepsis.  Specimens have been collected and sent for 

resulting CBC metabolic panel urinalysis and plan for CT abdomen pelvis


Diagnosis





 Primary Impression:  


 Abdominal pain


 Additional Impressions:  


 Gastroenteritis


 Dehydration


Referrals:  


Primary Care Physician


call for appointment


Patient Instructions:  General Instructions





***Additional Instruction:  


Follow clear liquid diet for next 12-24 hours advance as tolerated to bland/

brat diet and regular diet avoiding fried and fatty foods


Take acetaminophen as needed for fever 100.4F or greater


Take Reglan as prescribed as needed for nausea and/or vomiting 


complete course of antibiotic as prescribed


***Med/Other Pt SpecificInfo:  Prescription(s) given


Scripts


Tramadol (Tramadol) 50 Mg Tab


50 MG PO Q8H Y for PAIN, #3 TAB 0 Refills


   Prov: Karley Knox MD         5/31/18 


Dicyclomine (Bentyl) 10 Mg Cap


10 MG PO TID Y for Bowel Management, #7 CAP 0 Refills


   Prov: Karley Knox MD         5/31/18


Disposition:  01 DISCHARGE HOME


Condition:  Stable











Karley Knox MD May 31, 2018 00:09

## 2018-06-12 ENCOUNTER — HOSPITAL ENCOUNTER (EMERGENCY)
Dept: HOSPITAL 17 - NEPE | Age: 43
LOS: 1 days | Discharge: HOME | End: 2018-06-13
Payer: SELF-PAY

## 2018-06-12 VITALS
DIASTOLIC BLOOD PRESSURE: 75 MMHG | RESPIRATION RATE: 18 BRPM | TEMPERATURE: 97.9 F | HEART RATE: 87 BPM | OXYGEN SATURATION: 100 % | SYSTOLIC BLOOD PRESSURE: 137 MMHG

## 2018-06-12 VITALS — HEIGHT: 63 IN | WEIGHT: 198.42 LBS | BODY MASS INDEX: 35.16 KG/M2

## 2018-06-12 DIAGNOSIS — Z90.49: ICD-10-CM

## 2018-06-12 DIAGNOSIS — M79.7: ICD-10-CM

## 2018-06-12 DIAGNOSIS — R19.7: ICD-10-CM

## 2018-06-12 DIAGNOSIS — I10: ICD-10-CM

## 2018-06-12 DIAGNOSIS — F31.9: ICD-10-CM

## 2018-06-12 DIAGNOSIS — F17.210: ICD-10-CM

## 2018-06-12 DIAGNOSIS — K21.9: ICD-10-CM

## 2018-06-12 DIAGNOSIS — R10.9: Primary | ICD-10-CM

## 2018-06-12 DIAGNOSIS — T81.31XA: ICD-10-CM

## 2018-06-12 DIAGNOSIS — K59.00: ICD-10-CM

## 2018-06-12 DIAGNOSIS — R11.2: ICD-10-CM

## 2018-06-12 PROCEDURE — 86140 C-REACTIVE PROTEIN: CPT

## 2018-06-12 PROCEDURE — 99284 EMERGENCY DEPT VISIT MOD MDM: CPT

## 2018-06-12 PROCEDURE — 80053 COMPREHEN METABOLIC PANEL: CPT

## 2018-06-12 PROCEDURE — 96361 HYDRATE IV INFUSION ADD-ON: CPT

## 2018-06-12 PROCEDURE — 83690 ASSAY OF LIPASE: CPT

## 2018-06-12 PROCEDURE — 74019 RADEX ABDOMEN 2 VIEWS: CPT

## 2018-06-12 PROCEDURE — 96374 THER/PROPH/DIAG INJ IV PUSH: CPT

## 2018-06-12 PROCEDURE — 81001 URINALYSIS AUTO W/SCOPE: CPT

## 2018-06-12 PROCEDURE — 87070 CULTURE OTHR SPECIMN AEROBIC: CPT

## 2018-06-12 PROCEDURE — 87186 SC STD MICRODIL/AGAR DIL: CPT

## 2018-06-12 PROCEDURE — 85025 COMPLETE CBC W/AUTO DIFF WBC: CPT

## 2018-06-12 PROCEDURE — 86403 PARTICLE AGGLUT ANTBDY SCRN: CPT

## 2018-06-13 VITALS — RESPIRATION RATE: 16 BRPM

## 2018-06-13 LAB
ALBUMIN SERPL-MCNC: 3.8 GM/DL (ref 3.4–5)
ALP SERPL-CCNC: 111 U/L (ref 45–117)
ALT SERPL-CCNC: 18 U/L (ref 10–53)
AST SERPL-CCNC: 24 U/L (ref 15–37)
BASOPHILS # BLD AUTO: 0.1 TH/MM3 (ref 0–0.2)
BASOPHILS NFR BLD: 1.2 % (ref 0–2)
BILIRUB SERPL-MCNC: 0.2 MG/DL (ref 0.2–1)
BUN SERPL-MCNC: 12 MG/DL (ref 7–18)
CALCIUM SERPL-MCNC: 8.6 MG/DL (ref 8.5–10.1)
CHLORIDE SERPL-SCNC: 102 MEQ/L (ref 98–107)
COLOR UR: COLORLESS
CREAT SERPL-MCNC: 0.88 MG/DL (ref 0.5–1)
CRP SERPL-MCNC: 2.31 MG/DL (ref 0–0.3)
EOSINOPHIL # BLD: 0.3 TH/MM3 (ref 0–0.4)
EOSINOPHIL NFR BLD: 5.7 % (ref 0–4)
ERYTHROCYTE [DISTWIDTH] IN BLOOD BY AUTOMATED COUNT: 20.3 % (ref 11.6–17.2)
GFR SERPLBLD BASED ON 1.73 SQ M-ARVRAT: 70 ML/MIN (ref 89–?)
GLUCOSE SERPL-MCNC: 72 MG/DL (ref 74–106)
GLUCOSE UR STRIP-MCNC: (no result) MG/DL
HCO3 BLD-SCNC: 17.4 MEQ/L (ref 21–32)
HCT VFR BLD CALC: 32.6 % (ref 35–46)
HGB BLD-MCNC: 10.8 GM/DL (ref 11.6–15.3)
HGB UR QL STRIP: (no result)
KETONES UR STRIP-MCNC: (no result) MG/DL
LYMPHOCYTES # BLD AUTO: 2.3 TH/MM3 (ref 1–4.8)
LYMPHOCYTES NFR BLD AUTO: 38.3 % (ref 9–44)
MCH RBC QN AUTO: 29.5 PG (ref 27–34)
MCHC RBC AUTO-ENTMCNC: 33.3 % (ref 32–36)
MCV RBC AUTO: 88.7 FL (ref 80–100)
MONOCYTE #: 0.3 TH/MM3 (ref 0–0.9)
MONOCYTES NFR BLD: 4.8 % (ref 0–8)
NEUTROPHILS # BLD AUTO: 3.1 TH/MM3 (ref 1.8–7.7)
NEUTROPHILS NFR BLD AUTO: 50 % (ref 16–70)
NITRITE UR QL STRIP: (no result)
PLATELET # BLD: 381 TH/MM3 (ref 150–450)
PMV BLD AUTO: 6.5 FL (ref 7–11)
PROT SERPL-MCNC: 7.1 GM/DL (ref 6.4–8.2)
RBC # BLD AUTO: 3.67 MIL/MM3 (ref 4–5.3)
SODIUM SERPL-SCNC: 131 MEQ/L (ref 136–145)
SP GR UR STRIP: 1 (ref 1–1.03)
SQUAMOUS #/AREA URNS HPF: 1 /HPF (ref 0–5)
URINE LEUKOCYTE ESTERASE: (no result)
WBC # BLD AUTO: 6.1 TH/MM3 (ref 4–11)

## 2018-06-13 NOTE — RADRPT
EXAM DATE:  2018 2:12 AM EDT

AGE/SEX:        43 years / Female



INDICATIONS:  Nausea and vomiting.



CLINICAL DATA:  This is the patient's initial encounter. Patient reports that signs and symptoms have
 been present for 1 day and indicates a pain score of 0/10. 

                                                                          

MEDICAL/SURGICAL HISTORY:       Gastroesophageal reflux disease. Endometriosis.  Appendectomy.  [place]
cystectomy.  Hysterectomy.   section.



COMPARISON:      No prior exams available for comparison. 





FINDINGS:  

Supine and upright views of the abdomen. Surgical clips in the right upper quadrant of the abdomen. S
cattered gas in nondilated small bowel and colon. No evidence of free air. No abnormal abdominal calc
ification. Osseous structures within normal limits.



CONCLUSION: 

Nonspecific bowel gas pattern.



Electronically signed by: Isac Rey MD  2018 2:47 AM EDT

## 2018-06-13 NOTE — PD
HPI


Chief Complaint:  Skin Problem


Time Seen by Provider:  00:02


Travel History


International Travel<30 days:  No


Contact w/Intl Traveler<30days:  No


Traveled to known affect area:  No





History of Present Illness


HPI


The patient is a 43 year old female who presents to the [facility] 

emergency department with a history of noting erythema around the postoperative 

wound that she first noticed to 3 days ago.  She reports that there has been a 

small amount of drainage also intermittently coming from the wound.  The 

patient reports that she underwent cholecystectomy at the end of May in 

[place].  She was told to follow-up with a local general surgeon when she 

returns back home as she was visiting that area.  The patient reports that 

since surgery she continues to have some abdominal distention.  She also 

reports having problems with constipation now.  From reviewing the record after 

the surgery she has been evaluated multiple times for nausea, vomiting, and 

diarrhea.  The patient underwent a CAT scan of the abdomen and pelvis on May 31

, 2018 that showed a small splenic artery aneurysm that is been present 

previously and unchanged since 2016, moderate amount of stool throughout the 

colon and otherwise unremarkable.  The patient is concerned that the wound on 

her abdomen may be getting infected.  She reports that she last moved her 

bowels earlier today, however she used Ex-Lax to do this.  She denies taking 

any pain medication currently.  She does take Phenergan as needed for nausea.  

She reports that her primary care physician is Dr. Begum.  Review of systems 

otherwise, she denies having cough or congestion, neck pain, chest pain, 

shortness of breath, urinary symptoms, or neurologic symptoms.





[facility]


Past Medical History


*** Narrative Medical


The patient's past medical history is significant for chronic back pain, COPD, 

tachycardia, arthritis, bipolar disorder, acid reflux, migraine headaches


Arthritis:  Yes


Bipolar Disorder:  Yes


Anxiety:  Yes


Depression:  Yes


Heart Rhythm Problems:  Yes (tachycardic arrythmia)


Cardiovascular Problems:  Yes


COPD:  Yes


Diminished Hearing:  No


Fibromyalgia:  Yes


Gastrointestinal Disorders:  Yes (bleeding ulcers)


GERD:  Yes


Hypertension:  Yes


Immune Disorder:  No


Insomnia:  Yes


Musculoskeletal:  Yes


Neurologic:  Yes


Psychiatric:  Yes


Respiratory:  Yes (copd)


Immunizations Current:  Yes


Migraines:  Yes


Seizures:  Yes


Ulcer:  Yes


Tetanus Vaccination:  < 5 Years


Influenza Vaccination:  No


Pregnant?:  Not Pregnant


:  4


Para:  2


Miscarriage:  2





Past Surgical History


*** Narrative Surgical


The patient's past surgical history is significant for appendectomy, 

cholecystectomy, cervical fusion 2, laparoscopy for endometriosis


Appendectomy:  Yes


 Section:  Yes


Cholecystectomy:  Yes


Genitourinary Surgery:  Yes (laproscopic endometriosis repair)


Gynecologic Surgery:  Yes (laproscopic endometriosis repair)


Hysterectomy:  Yes


Neurologic Surgery:  Yes (Cervical Fusionx2)


Other Surgery:  Yes





Social History


Alcohol Use:  No


Tobacco Use:  Yes (3-4 cigarettes per day)


Substance Use:  No





Allergies-Medications


(Allergen,Severity, Reaction):  


Coded Allergies:  


     azithromycin (Verified  Allergy, Severe, 18)


     ondansetron (Unverified  Allergy, Intermediate, VOMITING, 18)


     Sulfa (Sulfonamide Antibiotics) (Unverified  Adverse Reaction, Intermediate

, 18)


     cyclobenzaprine (Unverified  Adverse Reaction, Intermediate, 18)


     erythromycin base (Unverified  Adverse Reaction, Intermediate, 18)


     haloperidol (Unverified  Adverse Reaction, Intermediate, 18)


     propoxyphene (Unverified  Adverse Reaction, Intermediate, 18)


     sumatriptan (Unverified  Adverse Reaction, Intermediate, 18)


Reported Meds & Prescriptions





Reported Meds & Active Scripts


Active


Tramadol (Tramadol HCl) 50 Mg Tab 50 Mg PO Q8H PRN


Bentyl (Dicyclomine HCl) 10 Mg Cap 10 Mg PO TID PRN


Phenergan (Promethazine HCl) 25 Mg Tablet 25 Mg PO Q6H PRN


Reported


Fioricet (Butalbital-Acetaminophen-Caffeine) -40 Mg Cap 1 Cap PO Q4H PRN


Prozac (Fluoxetine HCl) 40 Mg Cap 80 Mg PO DAILY


Prilosec (Omeprazole Magnesium) 20 Mg Tab 40 Mg PO BID


Gabapentin 800 Mg Tab 800 Mg PO Q6HR


Atenolol 50 Mg Tab 50 Mg PO BID


Clonidine (Clonidine HCl) 0.2 Mg Tab 0.2 Mg PO TID








Review of Systems


Except as stated in HPI:  all other systems reviewed are Neg


General / Constitutional:  No: Fever


Eyes:  No: Visual changes


HENT:  No: Headaches


Cardiovascular:  No: Chest Pain or Discomfort


Respiratory:  No: Shortness of Breath


Gastrointestinal:  Positive: Nausea, Abdominal Pain, Constipation, Changes in 

Bowel Habits, No: Vomiting, Diarrhea, Hematemesis, Hematochezia, Indigestion, 

Loss of Appetite


Genitourinary:  No: Dysuria


Musculoskeletal:  No: Pain


Skin:  No Rash


Neurologic:  No: Weakness, Focal Abnormalities, Change in Mentation, Slurred 

Speech, Sensory Disturbance


Psychiatric:  No: Depression


Endocrine:  No: Polydipsia


Hematologic/Lymphatic:  No: Easy Bruising





Physical Exam


Narrative


General: 


The patient is a well-developed well-nourished female in no acute distress. 





Head and Neck exam: 


Head is normocephalic atraumatic. 


Eyes: EOMI, pupils are equal round and reactive to light. 


Nose: Midline septum with pink mucous membranes 


Mouth: Dentition unremarkable. Moist mucus membranes. Posterior oropharynx is 

not erythematous. No tonsillar hypertrophy. Uvula midline. Airway patent. 


Neck: No palpable lymphadenopathy. No nuchal rigidity. No thyromegaly. 





Cardiovascular: 


Regular rate and rhythm without murmurs, gallops, or rubs. No pulse deficit to 

the extremities on simultaneous auscultation and palpation of her radial 

artery. 





Lungs: 


Clear to auscultation bilaterally. No wheezes, rhonchi, or rales.


 


Abdomen:


Soft, without tenderness to palpation in all 4 quadrants of the abdomen. No 

guarding, rebound, or rigidity.  Normal bowel sounds are audible.  No 

tenderness on palpation of McBurney's point.  Negative Johnson sign.  The 

patient has a healing wound in the right upper quadrant postop.  The 

periumbilical wound has dehisced superficially.  There is a scant amount of 

clear drainage noted.  This was cultured.  There is a border of erythema 

without induration.





Extremities: 


No clubbing, cyanosis, or edema. 2+ pulses in all 4 extremities.  No calf 

tenderness on palpation.





Back: 


No spinous process tenderness to palpation. No costovertebral angle tenderness 

to palpation. 





Neurologic Exam: Grossly nonfocal.





Skin Exam: No rash noted. Intact skin that is warm and dry.





Data


Data


Last Documented VS





Vital Signs








  Date Time  Temp Pulse Resp B/P (MAP) Pulse Ox O2 Delivery O2 Flow Rate FiO2


 


18 02:53   16     


 


18 23:38 97.9 87  137/75 (95) 100   








Orders





 Orders


Complete Blood Count With Diff (18 00:33)


Comprehensive Metabolic Panel (18 00:33)


C-Reactive Protein (Crp) (18 00:33)


Lipase (18 00:33)


Urinalysis - C+S If Indicated (18 00:33)


Wound Culture And Gram Stain (18 00:33)


Iv Access Insert/Monitor (18 00:33)


Ecg Monitoring (18 00:33)


Oximetry (18 00:33)


Sodium Chlorid 0.9% 500 Ml Inj (Ns 500 M (18 01:45)


Mupirocin 2% Oint (Bactroban 2% Oint) (18 01:45)


Ketorolac Inj (Toradol Inj) (18 01:45)


Abdomen, Flat & Upright (18 01:42)





Labs





Laboratory Tests








Test


  18


01:00


 


White Blood Count 6.1 TH/MM3 


 


Red Blood Count 3.67 MIL/MM3 


 


Hemoglobin 10.8 GM/DL 


 


Hematocrit 32.6 % 


 


Mean Corpuscular Volume 88.7 FL 


 


Mean Corpuscular Hemoglobin 29.5 PG 


 


Mean Corpuscular Hemoglobin


Concent 33.3 % 


 


 


Red Cell Distribution Width 20.3 % 


 


Platelet Count 381 TH/MM3 


 


Mean Platelet Volume 6.5 FL 


 


Neutrophils (%) (Auto) 50.0 % 


 


Lymphocytes (%) (Auto) 38.3 % 


 


Monocytes (%) (Auto) 4.8 % 


 


Eosinophils (%) (Auto) 5.7 % 


 


Basophils (%) (Auto) 1.2 % 


 


Neutrophils # (Auto) 3.1 TH/MM3 


 


Lymphocytes # (Auto) 2.3 TH/MM3 


 


Monocytes # (Auto) 0.3 TH/MM3 


 


Eosinophils # (Auto) 0.3 TH/MM3 


 


Basophils # (Auto) 0.1 TH/MM3 


 


CBC Comment DIFF FINAL 


 


Differential Comment  


 


Urine Color COLORLESS 


 


Urine Turbidity CLEAR 


 


Urine pH 5.0 


 


Urine Specific Gravity 1.005 


 


Urine Protein NEG mg/dL 


 


Urine Glucose (UA) NEG mg/dL 


 


Urine Ketones NEG mg/dL 


 


Urine Occult Blood NEG 


 


Urine Nitrite NEG 


 


Urine Bilirubin NEG 


 


Urine Urobilinogen


  LESS THAN 2.0


MG/DL


 


Urine Leukocyte Esterase TRACE 


 


Urine WBC 1 /hpf 


 


Urine Squamous Epithelial


Cells 1 /hpf 


 


 


Microscopic Urinalysis Comment


  CULT NOT


INDICATED


 


Blood Urea Nitrogen 12 MG/DL 


 


Creatinine 0.88 MG/DL 


 


Random Glucose 72 MG/DL 


 


Total Protein 7.1 GM/DL 


 


Albumin 3.8 GM/DL 


 


Calcium Level 8.6 MG/DL 


 


Alkaline Phosphatase 111 U/L 


 


Aspartate Amino Transf


(AST/SGOT) 24 U/L 


 


 


Alanine Aminotransferase


(ALT/SGPT) 18 U/L 


 


 


Total Bilirubin 0.2 MG/DL 


 


Sodium Level 131 MEQ/L 


 


Potassium Level 3.9 MEQ/L 


 


Chloride Level 102 MEQ/L 


 


Carbon Dioxide Level 17.4 MEQ/L 


 


Anion Gap 12 MEQ/L 


 


Estimat Glomerular Filtration


Rate 70 ML/MIN 


 


 


C-Reactive Protein 2.31 MG/DL 


 


Lipase 135 U/L 











MDM


Medical Decision Making


Medical Screen Exam Complete:  Yes


Emergency Medical Condition:  Yes


Medical Record Reviewed:  Yes


Differential Diagnosis


Bowel obstruction, versus constipation, versus wound dehiscence, versus 

postoperative wound infection


Narrative Course


During the course of the patient's emergency department visit, the patient's 

history, examination, and differential diagnosis were reviewed with the 

patient. The patient was placed on a cardiac monitor with oximetry and frequent 

blood pressure monitoring. The patient had  IV access obtained and blood work 

sent for analysis. 





The patient was initially provided the patient had Bactroban ointment applied 

to the postoperative wound.  A bandage was applied.  The patient was given 

Toradol for pain, normal saline IV fluids.





The patient's laboratory studies were reviewed and remarkable for a white count 

of 6.1, hemoglobin 10.8, platelets 381 with 5.7 eosinophils, CMP is remarkable 

for a sodium of 131, CO2 17.4, glucose 72, C-reactive protein 2.31, lipase 135, 

urinalysis shows trace leukocyte esterase otherwise unremarkable.





Radiology studies were reviewed and remarkable for 


Last Impressions








Abdomen X-Ray 18 0142 Signed





Impressions: 





 CONCLUSION: 





 Nonspecific bowel gas pattern.





  





 








The patient was instructed regarding natural measures to treat constipation.  

She was instructed to increase the fiber in her diet.  She is instructed to 

walk 30 minutes daily.  She is instructed to drink at least 8 glasses of water 

per day.  The patient is instructed that if she continues to have in spite of 

these measures she could take MiraLAX.  She is given a prescription.  The 

patient on examination is noted to have a postoperative wound with superficial 

dehiscence with a pink border and occasional clear drainage.  There is no 

induration to suggest a deeper infection.  The patient was instructed on wound 

care measures.  The patient had Bactroban ointment applied to the wound and a 

dressing.  She is instructed to do this twice daily.  She is a follow-up with 

the general surgeon locally as previously recommended.  She is instructed to 

follow-up with her primary care physician for recheck of the wound in 2 days.


To the patient that I am hesitant to start her on an oral antibiotic as she did 

recently have colitis and diarrhea, and on exam there is no significant 

infection noted currently.





The patient is resting comfortably and feels better, is alert and in no 

distress. The patient's results and examination findings were discussed with 

the patient. The repeat examination is unremarkable and benign. The history, 

exam, diagnostic testing, and current condition do not suggest any significant 

pathology to warrant further testing, continued ED treatment, admission, or 

surgical evaluation at this point. The vital signs have been stable. The 

patient does not have uncontrollable pain, intractable vomiting, or other 

significant symptoms. The patient's condition is stable and appropriate for 

discharge. The patient will pursue further outpatient evaluation with a primary 

care physician or other designated or consulting physician as indicated in the 

discharge instructions.  The patient is instructed to report back to the 

emergency department immediately for reexamination  in the mean time if  she 

develops any new or worsening signs or symptoms.  The patient expressed 

understanding and was agreeable with this plan.





Diagnosis





 Primary Impression:  


 Abdominal pain


 Qualified Codes:  R10.9 - Unspecified abdominal pain


 Additional Impressions:  


 Constipation


 Qualified Codes:  K59.00 - Constipation, unspecified


 Postoperative dehiscence of skin wound


 Qualified Codes:  T81.31XA - Disruption of external operation (surgical) wound

, not elsewhere classified, initial encounter


Referrals:  


General Surgeon


as needed





Primary Care Physician


2 days


Patient Instructions:  Abdominal Pain (ED), Constipation (ED), General 

Instructions, Wound Dehiscence (ED)


***Med/Other Pt SpecificInfo:  Prescription(s) given


Scripts


Polyethylene Glycol 3350 Powder (Miralax Powder) 17 Gm Powd


17 GM PO DAILY for Constipation, #1 CAN 0 Refills


   Mix and dissolve one measuring cap-ful (17 grams) in water or juice.


   Prov: Tresa Bal MD         18 


Mupirocin Topical (Bactroban Topical) 22 Gm Cream


1 APPLIC TOPICAL TID for Mgmt Bacterial Infection for 10 Days, #1 TUBE 0 Refills


   Prov: Tresa Bal MD         18


Disposition:  01 DISCHARGE HOME


Condition:  Stable











Tresa Bal MD 2018 02:11

## 2018-06-21 ENCOUNTER — HOSPITAL ENCOUNTER (EMERGENCY)
Dept: HOSPITAL 17 - NEPK | Age: 43
Discharge: HOME | End: 2018-06-21
Payer: SELF-PAY

## 2018-06-21 VITALS — HEIGHT: 64 IN | WEIGHT: 198.42 LBS | BODY MASS INDEX: 33.87 KG/M2

## 2018-06-21 VITALS
RESPIRATION RATE: 20 BRPM | HEART RATE: 102 BPM | TEMPERATURE: 98.2 F | DIASTOLIC BLOOD PRESSURE: 92 MMHG | SYSTOLIC BLOOD PRESSURE: 174 MMHG | OXYGEN SATURATION: 99 %

## 2018-06-21 DIAGNOSIS — M19.90: ICD-10-CM

## 2018-06-21 DIAGNOSIS — Z79.899: ICD-10-CM

## 2018-06-21 DIAGNOSIS — G47.00: ICD-10-CM

## 2018-06-21 DIAGNOSIS — M79.7: ICD-10-CM

## 2018-06-21 DIAGNOSIS — Z76.0: ICD-10-CM

## 2018-06-21 DIAGNOSIS — Z88.2: ICD-10-CM

## 2018-06-21 DIAGNOSIS — F41.9: ICD-10-CM

## 2018-06-21 DIAGNOSIS — J44.9: ICD-10-CM

## 2018-06-21 DIAGNOSIS — G62.9: ICD-10-CM

## 2018-06-21 DIAGNOSIS — F31.9: ICD-10-CM

## 2018-06-21 DIAGNOSIS — Z86.69: ICD-10-CM

## 2018-06-21 DIAGNOSIS — I10: Primary | ICD-10-CM

## 2018-06-21 DIAGNOSIS — F39: ICD-10-CM

## 2018-06-21 DIAGNOSIS — Z88.8: ICD-10-CM

## 2018-06-21 DIAGNOSIS — K21.9: ICD-10-CM

## 2018-06-21 DIAGNOSIS — Z88.1: ICD-10-CM

## 2018-06-21 PROCEDURE — 99281 EMR DPT VST MAYX REQ PHY/QHP: CPT

## 2018-06-21 NOTE — PD
HPI


Chief Complaint:  Medication Refill Request


Time Seen by Provider:  10:23


Travel History


International Travel<30 days:  No


Contact w/Intl Traveler<30days:  No


Traveled to known affect area:  No





History of Present Illness


HPI


43-year-old female presents to the emergency department requesting refills on 

gabapentin and clonidine.  She says she took her last dose of this morning.  

She is unable to follow-up with her primary care provider, because she is not 

in the office right now.  She takes clonidine for hypertension and gabapentin 

for her mood disorder and neuropathy.  She has no emergent medical complaints.  

She denies chest pain, shortness of breath, abdominal pain, nausea, vomiting, 

fevers.  Symptoms are mild in severity.  No known aggravating or relieving 

factors.  Onset unknown.  Duration unknown.  Allergies as listed on the chart.  

Primary CARE providers Dr. Begum.  History of hypertension, neuropathy, COPD, 

seizures.  Says she does not take medications for her seizures.  Has no other 

medical complaints.  No other modifying factors or associated signs and 

symptoms.





PFSH


Past Medical History


Arthritis:  Yes


Bipolar Disorder:  Yes


Anxiety:  Yes


Depression:  Yes


Heart Rhythm Problems:  Yes (tachycardic arrythmia)


Cardiovascular Problems:  Yes


COPD:  Yes


Diminished Hearing:  No


Fibromyalgia:  Yes


Gastrointestinal Disorders:  Yes (bleeding ulcers)


GERD:  Yes


Hypertension:  Yes


Immune Disorder:  No


Insomnia:  Yes


Musculoskeletal:  Yes


Neurologic:  Yes


Psychiatric:  Yes


Respiratory:  Yes


Immunizations Current:  Yes


Migraines:  Yes


Seizures:  Yes


Ulcer:  Yes


Pregnant?:  Not Pregnant


:  4


Para:  2


Miscarriage:  2





Past Surgical History


Appendectomy:  Yes


 Section:  Yes


Cholecystectomy:  Yes


Genitourinary Surgery:  Yes (laproscopic endometriosis repair)


Gynecologic Surgery:  Yes (laproscopic endometriosis repair)


Hysterectomy:  Yes


Neurologic Surgery:  Yes (Cervical Fusionx2)


Other Surgery:  Yes





Social History


Alcohol Use:  No


Tobacco Use:  Yes (3-4 cigarettes per day)


Substance Use:  No





Allergies-Medications


(Allergen,Severity, Reaction):  


Coded Allergies:  


     azithromycin (Verified  Allergy, Severe, 18)


     ondansetron (Unverified  Allergy, Intermediate, VOMITING, 18)


     Sulfa (Sulfonamide Antibiotics) (Unverified  Adverse Reaction, Intermediate

, 18)


     cyclobenzaprine (Unverified  Adverse Reaction, Intermediate, 18)


     erythromycin base (Unverified  Adverse Reaction, Intermediate, 18)


     haloperidol (Unverified  Adverse Reaction, Intermediate, 18)


     propoxyphene (Unverified  Adverse Reaction, Intermediate, 18)


     sumatriptan (Unverified  Adverse Reaction, Intermediate, 18)


Reported Meds & Prescriptions





Reported Meds & Active Scripts


Active


Gabapentin 800 Mg Tab 800 Mg PO QID 7 Days


Clonidine (Clonidine HCl) 0.2 Mg Tab 0.2 Mg PO TID 7 Days


Bactroban Topical (Mupirocin) 22 Gm Cream 1 Applic TOPICAL TID 10 Days


Reported


Prozac (Fluoxetine HCl) 40 Mg Cap 80 Mg PO DAILY


Prilosec (Omeprazole Magnesium) 20 Mg Tab 40 Mg PO BID


Gabapentin 800 Mg Tab 800 Mg PO Q6HR


Atenolol 50 Mg Tab 50 Mg PO BID


Clonidine (Clonidine HCl) 0.2 Mg Tab 0.2 Mg PO TID








Review of Systems


Except as stated in HPI:  all other systems reviewed are Neg





Physical Exam


Narrative


GENERAL: Well-nourished, well-developed  female patient, in no acute 

distress


SKIN: Warm and dry.


HEAD: Atraumatic. Normocephalic. 


EYES: Pupils equal and round. No scleral icterus. No injection or drainage.


ENT: Mucosa pink and moist.  Airway patent.  


NECK: Trachea midline.  


CARDIOVASCULAR: Regular rate.  


RESPIRATORY: No accessory muscle use.


GASTROINTESTINAL: Rounded.


MUSCULOSKELETAL:  No obvious deformities. No clubbing.  No cyanosis.  No edema. 


NEUROLOGICAL: Awake and alert.  Oriented 3.  No obvious cranial nerve 

deficits.  Motor grossly within normal limits. Normal speech. 


PSYCHIATRIC: Appropriate mood and affect; insight and judgment normal.





Data


Data


Last Documented VS





Vital Signs








  Date Time  Temp Pulse Resp B/P (MAP) Pulse Ox O2 Delivery O2 Flow Rate FiO2


 


18 10:13 98.2 102 20 174/92 (119) [phone]   








Orders





 Orders


Ed Discharge Order (18 10:38)








MDM


Medical Decision Making


Medical Screen Exam Complete:  Yes


Emergency Medical Condition:  Yes


Medical Record Reviewed:  Yes


Differential Diagnosis


Medication refill, hypertension, neuropathy


Narrative Course


43-year-old female presents for medication refill on clonidine and gabapentin.  

She has a primary care provider she can follow up with for further refills.  I 

will prescribe a weeks worth of the gabapentin and clonidine.  Instructed 

patient to call and make an appointment for follow-up for further prescription 

refills.  Instructed patient to follow up with primary care provider.  Patient 

verbalizes understanding and agreement with treatment plan.  Patient is 

medically cleared and stable for discharge.  Discussed reasons to return to the 

emergency department.  Patient agrees with treatment plan.  The patients vital 

signs are stable and the patient is stable for outpatient follow-up and 

treatment.  Patient discharged home, stable and in no acute distress.





Diagnosis





 Primary Impression:  


 Medication refill


Referrals:  


[facility]





Primary Care Physician


Patient Instructions:  General Instructions, Medication Refill, ED





***Additional Instructions:  


Take medications as prescribed


Follow-up with primary care provider for continued prescription refills


Return to the emergency department immediately with worsening of symptoms


***Med/Other Pt SpecificInfo:  Prescription(s) given


Scripts


Gabapentin (Gabapentin) 800 Mg Tab


800 MG PO QID for 7 Days, TAB 0 Refills


   Prov: Lu Yang         18 


Clonidine (Clonidine) 0.2 Mg Tab


0.2 MG PO TID for Blood Pressure Management for 7 Days, TAB 0 Refills


   Prov: Lu Yang         18


Disposition:  01 DISCHARGE HOME


Condition:  Stable











Lu Yang 2018 10:36
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
